# Patient Record
Sex: MALE | Race: WHITE | URBAN - METROPOLITAN AREA
[De-identification: names, ages, dates, MRNs, and addresses within clinical notes are randomized per-mention and may not be internally consistent; named-entity substitution may affect disease eponyms.]

---

## 2017-05-17 ENCOUNTER — OFFICE VISIT (OUTPATIENT)
Dept: NEUROLOGY | Age: 67
End: 2017-05-17

## 2017-05-17 VITALS
HEIGHT: 74 IN | WEIGHT: 173.4 LBS | BODY MASS INDEX: 22.25 KG/M2 | DIASTOLIC BLOOD PRESSURE: 80 MMHG | OXYGEN SATURATION: 97 % | HEART RATE: 74 BPM | SYSTOLIC BLOOD PRESSURE: 122 MMHG

## 2017-05-17 DIAGNOSIS — G25.0 ESSENTIAL TREMOR: Primary | ICD-10-CM

## 2017-05-17 NOTE — PATIENT INSTRUCTIONS
1.  Follow-up in 6 months      A Healthy Lifestyle: Care Instructions  Your Care Instructions  A healthy lifestyle can help you feel good, stay at a healthy weight, and have plenty of energy for both work and play. A healthy lifestyle is something you can share with your whole family. A healthy lifestyle also can lower your risk for serious health problems, such as high blood pressure, heart disease, and diabetes. You can follow a few steps listed below to improve your health and the health of your family. Follow-up care is a key part of your treatment and safety. Be sure to make and go to all appointments, and call your doctor if you are having problems. Its also a good idea to know your test results and keep a list of the medicines you take. How can you care for yourself at home? · Do not eat too much sugar, fat, or fast foods. You can still have dessert and treats now and then. The goal is moderation. · Start small to improve your eating habits. Pay attention to portion sizes, drink less juice and soda pop, and eat more fruits and vegetables. ¨ Eat a healthy amount of food. A 3-ounce serving of meat, for example, is about the size of a deck of cards. Fill the rest of your plate with vegetables and whole grains. ¨ Limit the amount of soda and sports drinks you have every day. Drink more water when you are thirsty. ¨ Eat at least 5 servings of fruits and vegetables every day. It may seem like a lot, but it is not hard to reach this goal. A serving or helping is 1 piece of fruit, 1 cup of vegetables, or 2 cups of leafy, raw vegetables. Have an apple or some carrot sticks as an afternoon snack instead of a candy bar. Try to have fruits and/or vegetables at every meal.  · Make exercise part of your daily routine. You may want to start with simple activities, such as walking, bicycling, or slow swimming. Try to be active 30 to 60 minutes every day. You do not need to do all 30 to 60 minutes all at once.  For example, you can exercise 3 times a day for 10 or 20 minutes. Moderate exercise is safe for most people, but it is always a good idea to talk to your doctor before starting an exercise program.  · Keep moving. Karen Crock the lawn, work in the garden, or [employer]. Take the stairs instead of the elevator at work. · If you smoke, quit. People who smoke have an increased risk for heart attack, stroke, cancer, and other lung illnesses. Quitting is hard, but there are ways to boost your chance of quitting tobacco for good. ¨ Use nicotine gum, patches, or lozenges. ¨ Ask your doctor about stop-smoking programs and medicines. ¨ Keep trying. In addition to reducing your risk of diseases in the future, you will notice some benefits soon after you stop using tobacco. If you have shortness of breath or asthma symptoms, they will likely get better within a few weeks after you quit. · Limit how much alcohol you drink. Moderate amounts of alcohol (up to 2 drinks a day for men, 1 drink a day for women) are okay. But drinking too much can lead to liver problems, high blood pressure, and other health problems. Family health  If you have a family, there are many things you can do together to improve your health. · Eat meals together as a family as often as possible. · Eat healthy foods. This includes fruits, vegetables, lean meats and dairy, and whole grains. · Include your family in your fitness plan. Most people think of activities such as jogging or tennis as the way to fitness, but there are many ways you and your family can be more active. Anything that makes you breathe hard and gets your heart pumping is exercise. Here are some tips:  ¨ Walk to do errands or to take your child to school or the bus. ¨ Go for a family bike ride after dinner instead of watching TV. Where can you learn more? Go to http://garcia-stephanie.info/.   Enter F029 in the search box to learn more about \"A Healthy Lifestyle: Care Instructions. \"  Current as of: July 26, 2016  Content Version: 11.2  © 7171-9905 [employer]. Care instructions adapted under license by [employer] (which disclaims liability or warranty for this information). If you have questions about a medical condition or this instruction, always ask your healthcare professional. [place] [address] any warranty or liability for your use of this information.

## 2017-05-17 NOTE — PROGRESS NOTES
Neurology consultation note  REFERRED BY:  None    05/17/17    Chief Complaint   Patient presents with    New Patient     tremor both hands/head       HISTORY OF PRESENT ILLNESS  Jean Gray is a 77 y.o. male who presented to the neurology office for management of tremors. He states that the tremor started around a year ago and it has been gradually progressing. If this in his neck and his left hand. There are no aggravating or relieving factors. It is postural and there does not seem to be any dictation to alcohol. There is no family history. He has no sense of taste or smell but denies any bradykinesia, rigidity or problems with gait. No current outpatient prescriptions on file. No current facility-administered medications for this visit. Allergies   Allergen Reactions    Pcn [Penicillins] Unknown (comments)     Past Medical History:   Diagnosis Date    Neurological disorder      Past Surgical History:   Procedure Laterality Date   [facility]ajat Devaughn ABDOMEN SURGERY PROC UNLISTED  3728,6712    colostomy, reversal    HX HERNIA REPAIR  2009    HX HERNIA REPAIR  2007     History reviewed. No pertinent family history. Social History   Substance Use Topics    Smoking status: Current Every Day Smoker    Smokeless tobacco: None    Alcohol use Yes      Comment: rarely       REVIEW OF SYSTEMS:   A ten system review of constitutional, cardiovascular, respiratory, musculoskeletal, endocrine, skin, SHEENT, genitourinary, psychiatric and neurologic systems was obtained and is unremarkable with the exception of joint pain, snoring     EXAMINATION:   Visit Vitals    /80    Pulse 74    Ht 6' 2\" (1.88 m)    Wt 173 lb 6.4 oz (78.7 kg)    SpO2 97%    BMI 22.26 kg/m2        General:   General appearance: Pt is in no acute distress   Distal pulses are preserved  Fundoscopic Exam: Attempted    Neurological Examination:   Mental Status: AAO x3. Speech is fluent.  Follows commands, has normal fund of knowledge, attention, short term recall, comprehension and insight. Cranial Nerves: Visual fields are full. PERRL, Extraocular movements are full. Facial sensation intact V1- V3. Facial movement intact, symmetric. Hearing intact to conversation. Palate elevates symmetrically. Shoulder shrug symmetric. Tongue midline. Motor: Strength is 5/5 in all 4 ext except week dorsal interossei of his right hand and weak right abductor pollicis previous. No atrophy. Tone: Normal    Sensation: Decreased pinprick distally in his right hand    Reflexes: DTRs 2+ throughout. Plantar responses downgoing. Coordination/Cerebellar: Intact to finger-nose-finger     Gait: Romberg is negative and casual gait is normal.     Skin: No significant bruising or lacerations. Laboratory review:   Results for orders placed or performed during the hospital encounter of 10/22/09   CBC W/O DIFF   Result Value Ref Range    WBC 13.4 (H) 4.1 - 11.1 K/uL    RBC 4.63 4.10 - 5.70 M/uL    HGB 14.5 12.1 - 17.0 g/dL    HCT 41.6 36.6 - 50.3 %    MCV 89.8 80.0 - 99.0 FL    MCH 31.3 26.0 - 34.0 PG    MCHC 34.9 30.0 - 35.0 g/dL    RDW 14.5 11.5 - 14.5 %    PLATELET 491 875 - 516 K/uL   METABOLIC PANEL, BASIC   Result Value Ref Range    Sodium 140 136 - 145 MMOL/L    Potassium 4.3 3.5 - 5.1 MMOL/L    Chloride 104 97 - 108 MMOL/L    CO2 27 21 - 32 MMOL/L    Anion gap 9 5 - 15 mmol/L    Glucose 98 50 - 100 MG/DL    BUN 10 6 - 20 MG/DL    Creatinine 0.8 0.6 - 1.3 MG/DL    BUN/Creatinine ratio 13 12 - 20      GFR est AA >60 >60 ml/min/1.73m2    GFR est non-AA >60 >60 ml/min/1.73m2    Calcium 9.4 8.5 - 10.1 MG/DL       Imaging review:  None    Documentation review:  None    Assessment/Plan:   Nelsy Brito is a 77 y.o. male who presented to the neurology office for management of tremors. Based on the history and examination, he does seem to have essential tremors. I did not find any signs of Parkinson's disease.   At this time having continued to observe him and see him back in 6 months. ICD-10-CM ICD-9-CM    1. Essential tremor G25.0 333.1       Over 45 minutes was spent with the patient and family of which > 50% of the visit was spent counseling on diagnosis, management, and treatment of the diagnosis. I did discuss about essential tremors and Parkinson's disease. Mark Sarabia MD  Diplomate, American Board of Psychiatry & Neurology (Neurology)  Sussy Healy Board of Psychiatry & Neurology (Clinical Neurophysiology)    CC: None  Fax: None    This note was created using voice recognition software. Despite editing, there may be syntax errors. This note will not be viewable in [address]

## 2017-05-17 NOTE — LETTER
5/17/2017 3:42 PM 
 
Patient:    Beverly Petty YOB: 1950 Date of Visit:    5/17/2017 Dear None Thank you for referring Mr. Melly Mckeon to me for evaluation/treatment. Below are the relevant portions of my assessment and plan of care. Neurology consultation note REFERRED BY: 
None 05/17/17 Chief Complaint Patient presents with  New Patient  
  tremor both hands/head HISTORY OF PRESENT ILLNESS Beverly Petty is a 77 y.o. male who presented to the neurology office for management of tremors. He states that the tremor started around a year ago and it has been gradually progressing. If this in his neck and his left hand. There are no aggravating or relieving factors. It is postural and there does not seem to be any dictation to alcohol. There is no family history. He has no sense of taste or smell but denies any bradykinesia, rigidity or problems with gait. No current outpatient prescriptions on file. No current facility-administered medications for this visit. Allergies Allergen Reactions  Pcn [Penicillins] Unknown (comments) Past Medical History:  
Diagnosis Date  Neurological disorder Past Surgical History:  
Procedure Laterality Date [facility] ABDOMEN SURGERY PROC UNLISTED  2474,7016  
 colostomy, reversal  
 HX HERNIA REPAIR  2009  HX HERNIA REPAIR  2007 History reviewed. No pertinent family history. Social History Substance Use Topics  Smoking status: Current Every Day Smoker  Smokeless tobacco: None  Alcohol use Yes Comment: rarely REVIEW OF SYSTEMS:  
A ten system review of constitutional, cardiovascular, respiratory, musculoskeletal, endocrine, skin, SHEENT, genitourinary, psychiatric and neurologic systems was obtained and is unremarkable with the exception of joint pain, snoring EXAMINATION:  
Visit Vitals  /80  Pulse 74  Ht 6' 2\" (1.88 m)  Wt 173 lb 6.4 oz (78.7 kg)  SpO2 97%  BMI 22.26 kg/m2 General:  
General appearance: Pt is in no acute distress Distal pulses are preserved Fundoscopic Exam: Attempted Neurological Examination:  
Mental Status: AAO x3. Speech is fluent. Follows commands, has normal fund of knowledge, attention, short term recall, comprehension and insight. Cranial Nerves: Visual fields are full. PERRL, Extraocular movements are full. Facial sensation intact V1- V3. Facial movement intact, symmetric. Hearing intact to conversation. Palate elevates symmetrically. Shoulder shrug symmetric. Tongue midline. Motor: Strength is 5/5 in all 4 ext except week dorsal interossei of his right hand and weak right abductor pollicis previous. No atrophy. Tone: Normal 
 
Sensation: Decreased pinprick distally in his right hand Reflexes: DTRs 2+ throughout. Plantar responses downgoing. Coordination/Cerebellar: Intact to finger-nose-finger Gait: Romberg is negative and casual gait is normal.  
 
Skin: No significant bruising or lacerations. Laboratory review:  
Results for orders placed or performed during the hospital encounter of 10/22/09 CBC W/O DIFF Result Value Ref Range WBC 13.4 (H) 4.1 - 11.1 K/uL  
 RBC 4.63 4.10 - 5.70 M/uL  
 HGB 14.5 12.1 - 17.0 g/dL HCT 41.6 36.6 - 50.3 % MCV 89.8 80.0 - 99.0 FL  
 MCH 31.3 26.0 - 34.0 PG  
 MCHC 34.9 30.0 - 35.0 g/dL  
 RDW 14.5 11.5 - 14.5 % PLATELET 047 147 - 935 K/uL METABOLIC PANEL, BASIC Result Value Ref Range Sodium 140 136 - 145 MMOL/L Potassium 4.3 3.5 - 5.1 MMOL/L Chloride 104 97 - 108 MMOL/L  
 CO2 27 21 - 32 MMOL/L Anion gap 9 5 - 15 mmol/L Glucose 98 50 - 100 MG/DL  
 BUN 10 6 - 20 MG/DL Creatinine 0.8 0.6 - 1.3 MG/DL  
 BUN/Creatinine ratio 13 12 - 20 GFR est AA >60 >60 ml/min/1.73m2 GFR est non-AA >60 >60 ml/min/1.73m2 Calcium 9.4 8.5 - 10.1 MG/DL Imaging review: 
None Documentation review: 
None Assessment/Plan: Fany Mancini is a 77 y.o. male who presented to the neurology office for management of tremors. Based on the history and examination, he does seem to have essential tremors. I did not find any signs of Parkinson's disease. At this time having continued to observe him and see him back in 6 months. ICD-10-CM ICD-9-CM 1. Essential tremor G25.0 333.1 Thank you for allowing me to participate in the care of Mr. Lisa Vasquez. Please feel free to contact me if you have any questions. Arthur Coello MD 
Diplomate, American Board of Psychiatry & Neurology (Neurology) Jarrett Corona Board of Psychiatry & Neurology (Clinical Neurophysiology) CC: None Fax: None This note was created using voice recognition software. Despite editing, there may be syntax errors. This note will not be viewable in [address] If you have questions, please do not hesitate to call me. I look forward to following Mr. Lisa Vasquez along with you. Sincerely, Arthur Coello MD

## 2017-05-17 NOTE — MR AVS SNAPSHOT
Visit Information Date & Time Provider Department Dept. Phone Encounter #  
 5/17/2017  3:00 PM Usha Quinn MD [facility] [phone] [id number] Upcoming Health Maintenance Date Due Hepatitis C Screening 1950 DTaP/Tdap/Td series (1 - Tdap) 6/8/1971 FOBT Q 1 YEAR AGE 50-75 6/8/2000 ZOSTER VACCINE AGE 60> 6/8/2010 GLAUCOMA SCREENING Q2Y 6/8/2015 Pneumococcal 65+ Low/Medium Risk (1 of 2 - PCV13) 6/8/2015 MEDICARE YEARLY EXAM 6/8/2015 INFLUENZA AGE 9 TO ADULT 8/1/2017 Allergies as of 5/17/2017  Review Complete On: 5/17/2017 By: Usha Quinn MD  
  
 Severity Noted Reaction Type Reactions Pcn [Penicillins]  08/27/2013    Unknown (comments) Current Immunizations  Never Reviewed No immunizations on file. Not reviewed this visit Vitals BP Pulse Height(growth percentile) Weight(growth percentile) SpO2 BMI  
 122/80 74 6' 2\" (1.88 m) 173 lb 6.4 oz (78.7 kg) 97% 22.26 kg/m2 Smoking Status Current Every Day Smoker BMI and BSA Data Body Mass Index Body Surface Area  
 22.26 kg/m 2 2.03 m 2 Preferred Pharmacy Pharmacy Name Phone [facility] [address] [facility] [address] [facility] [phone] Your Updated Medication List  
  
Notice  As of 5/17/2017  3:18 PM  
 You have not been prescribed any medications. Patient Instructions 1. Follow-up in 6 months A Healthy Lifestyle: Care Instructions Your Care Instructions A healthy lifestyle can help you feel good, stay at a healthy weight, and have plenty of energy for both work and play. A healthy lifestyle is something you can share with your whole family. A healthy lifestyle also can lower your risk for serious health problems, such as high blood pressure, heart disease, and diabetes.  
You can follow a few steps listed below to improve your health and the health of your family. Follow-up care is a key part of your treatment and safety. Be sure to make and go to all appointments, and call your doctor if you are having problems. Its also a good idea to know your test results and keep a list of the medicines you take. How can you care for yourself at home? · Do not eat too much sugar, fat, or fast foods. You can still have dessert and treats now and then. The goal is moderation. · Start small to improve your eating habits. Pay attention to portion sizes, drink less juice and soda pop, and eat more fruits and vegetables. ¨ Eat a healthy amount of food. A 3-ounce serving of meat, for example, is about the size of a deck of cards. Fill the rest of your plate with vegetables and whole grains. ¨ Limit the amount of soda and sports drinks you have every day. Drink more water when you are thirsty. ¨ Eat at least 5 servings of fruits and vegetables every day. It may seem like a lot, but it is not hard to reach this goal. A serving or helping is 1 piece of fruit, 1 cup of vegetables, or 2 cups of leafy, raw vegetables. Have an apple or some carrot sticks as an afternoon snack instead of a candy bar. Try to have fruits and/or vegetables at every meal. 
· Make exercise part of your daily routine. You may want to start with simple activities, such as walking, bicycling, or slow swimming. Try to be active 30 to 60 minutes every day. You do not need to do all 30 to 60 minutes all at once. For example, you can exercise 3 times a day for 10 or 20 minutes. Moderate exercise is safe for most people, but it is always a good idea to talk to your doctor before starting an exercise program. 
· Keep moving. Azucena Unger the lawn, work in the garden, or [employer]. Take the stairs instead of the elevator at work. · If you smoke, quit. People who smoke have an increased risk for heart attack, stroke, cancer, and other lung illnesses.  Quitting is hard, but there are ways to boost your chance of quitting tobacco for good. ¨ Use nicotine gum, patches, or lozenges. ¨ Ask your doctor about stop-smoking programs and medicines. ¨ Keep trying. In addition to reducing your risk of diseases in the future, you will notice some benefits soon after you stop using tobacco. If you have shortness of breath or asthma symptoms, they will likely get better within a few weeks after you quit. · Limit how much alcohol you drink. Moderate amounts of alcohol (up to 2 drinks a day for men, 1 drink a day for women) are okay. But drinking too much can lead to liver problems, high blood pressure, and other health problems. Family health If you have a family, there are many things you can do together to improve your health. · Eat meals together as a family as often as possible. · Eat healthy foods. This includes fruits, vegetables, lean meats and dairy, and whole grains. · Include your family in your fitness plan. Most people think of activities such as jogging or tennis as the way to fitness, but there are many ways you and your family can be more active. Anything that makes you breathe hard and gets your heart pumping is exercise. Here are some tips: 
¨ Walk to do errands or to take your child to school or the bus. ¨ Go for a family bike ride after dinner instead of watching TV. Where can you learn more? Go to http://garcia-stephanie.info/. Enter D054 in the search box to learn more about \"A Healthy Lifestyle: Care Instructions. \" Current as of: July 26, 2016 Content Version: 11.2 © 8482-5625 [employer]. Care instructions adapted under license by [employer] (which disclaims liability or warranty for this information). If you have questions about a medical condition or this instruction, always ask your healthcare professional. [place] [address] any warranty or liability for your use of this information. Introducing [facility]! Migel Carrion introduces [employer] patient portal. Now you can access parts of your medical record, email your doctor's office, and request medication refills online. 1. In your internet browser, go to [email or web address] 2. Click on the First Time User? Click Here link in the Sign In box. You will see the New Member Sign Up page. 3. Enter your [employer] Access Code exactly as it appears below. You will not need to use this code after youve completed the sign-up process. If you do not sign up before the expiration date, you must request a new code. · [employer] Access Code: [email or web address] Expires: 8/15/2017  2:04 PM 
 
4. Enter the last four digits of your Social Security Number (xxxx) and Date of Birth (mm/dd/yyyy) as indicated and click Submit. You will be taken to the next sign-up page. 5. Create a [employer] ID. This will be your [employer] login ID and cannot be changed, so think of one that is secure and easy to remember. 6. Create a [employer] password. You can change your password at any time. 7. Enter your Password Reset Question and Answer. This can be used at a later time if you forget your password. 8. Enter your e-mail address. You will receive e-mail notification when new information is available in [address] 9. Click Sign Up. You can now view and download portions of your medical record. 10. Click the Download Summary menu link to download a portable copy of your medical information. If you have questions, please visit the Frequently Asked Questions section of the [employer] website. Remember, [employer] is NOT to be used for urgent needs. For medical emergencies, dial 911. Now available from your iPhone and Android! Please provide this summary of care documentation to your next provider. Your primary care clinician is listed as NONE. If you have any questions after today's visit, please call [phone].

## 2017-06-29 ENCOUNTER — TELEPHONE (OUTPATIENT)
Dept: NEUROLOGY | Age: 67
End: 2017-06-29

## 2021-03-26 ENCOUNTER — OFFICE VISIT (OUTPATIENT)
Dept: URGENT CARE | Age: 71
End: 2021-03-26
Payer: MEDICARE

## 2021-03-26 VITALS — HEART RATE: 89 BPM | OXYGEN SATURATION: 97 % | TEMPERATURE: 98.4 F | RESPIRATION RATE: 16 BRPM

## 2021-03-26 DIAGNOSIS — Z11.59 SCREENING FOR VIRAL DISEASE: ICD-10-CM

## 2021-03-26 DIAGNOSIS — R09.81 SINUS CONGESTION: Primary | ICD-10-CM

## 2021-03-26 PROCEDURE — 99202 OFFICE O/P NEW SF 15 MIN: CPT | Performed by: FAMILY MEDICINE

## 2021-03-26 NOTE — PROGRESS NOTES
This patient was seen at [address] [place] [address] [facility]gent Care while in their vehicle due to COVID-19 pandemic with PPE and focused examination in order to decrease community viral transmission. The patient/guardian gave verbal consent to treat. Gelacio Lacy is a 79 y.o. male who presents for COVID-19 testing. Has had sinus congestion x 2 weeks. Denies cough, fever, SOB, N/V/D. The history is provided by the patient. Past Medical History:   Diagnosis Date    Neurological disorder         Past Surgical History:   Procedure Laterality Date    HX HERNIA REPAIR  2009    HX HERNIA REPAIR  2007    [place] ABDOMEN SURGERY PROC UNLISTED  [id number]    colostomy, reversal         History reviewed. No pertinent family history.      Social History     Socioeconomic History    Marital status:      Spouse name: Not on file    Number of children: Not on file    Years of education: Not on file    Highest education level: Not on file   Occupational History    Not on file   Social Needs    Financial resource strain: Not on file    Food insecurity     Worry: Not on file     Inability: Not on file    Transportation needs     Medical: Not on file     Non-medical: Not on file   Tobacco Use    Smoking status: Current Every Day Smoker   Substance and Sexual Activity    Alcohol use: Yes     Comment: rarely    Drug use: Not on file    Sexual activity: Not on file   Lifestyle    Physical activity     Days per week: Not on file     Minutes per session: Not on file    Stress: Not on file   Relationships    Social connections     Talks on phone: Not on file     Gets together: Not on file     Attends Mormonism service: Not on file     Active member of club or organization: Not on file     Attends meetings of clubs or organizations: Not on file     Relationship status: Not on file    Intimate partner violence     Fear of current or ex partner: Not on file     Emotionally abused: Not on file     Physically abused: Not on file     Forced sexual activity: Not on file   Other Topics Concern    Not on file   Social History Narrative    Not on file                ALLERGIES: Pcn [penicillins]    Review of Systems   Constitutional: Negative for fever. HENT: Positive for congestion. Respiratory: Negative for cough and shortness of breath. Gastrointestinal: Negative for diarrhea, nausea and vomiting. Vitals:    03/26/21 1236   Pulse: 89   Resp: 16   Temp: 98.4 °F (36.9 °C)   SpO2: 97%       Physical Exam  Vitals signs and nursing note reviewed. Constitutional:       General: He is not in acute distress. Appearance: He is well-developed. He is not diaphoretic. Pulmonary:      Effort: Pulmonary effort is normal. No respiratory distress. Breath sounds: Normal breath sounds. No stridor. No wheezing, rhonchi or rales. Neurological:      Mental Status: He is alert. Psychiatric:         Behavior: Behavior normal.         Thought Content: Thought content normal.         Judgment: Judgment normal.         MDM    ICD-10-CM ICD-9-CM   1. Sinus congestion  R09.81 478.19   2. Screening for viral disease  Z11.59 V73.99       Orders Placed This Encounter    NOVEL CORONAVIRUS (COVID-19)     Scheduling Instructions:      1) Due to current limited availability of the COVID-19 PCR test, tests will be prioritized and may not be completed.              2) Order only if the test result will change clinical management or necessary for a return to mission-critical employment decision.              3) Print and instruct patient to adhere to CDC home isolation program. (Link Above)              4) Set up or refer patient for a monitoring program.              5) Have patient sign up for and leverage Phase [employer] (if not previously done). Order Specific Question:   Is this test for diagnosis or screening? Answer:   Diagnosis of ill patient     Order Specific Question:   Symptomatic for COVID-19 as defined by CDC?      Answer: Yes     Order Specific Question:   Date of Symptom Onset     Answer:   3/12/2021     Order Specific Question:   Hospitalized for COVID-19? Answer:   No     Order Specific Question:   Admitted to ICU for COVID-19? Answer:   No     Order Specific Question:   Employed in healthcare setting? Answer:   No     Order Specific Question:   Resident in a congregate (group) care setting? Answer:   No     Order Specific Question:   Previously tested for COVID-19? Answer:   Unknown        Quarantine, await results    Provider will call if PCR COVID-19 test is positive     If signs and symptoms become worse the pt is to go to the ER.          Procedures

## 2021-03-28 LAB
SARS-COV-2, NAA 2 DAY TAT: NORMAL
SARS-COV-2, NAA: DETECTED

## 2021-03-29 NOTE — PROGRESS NOTES
Attempted to contact patient regarding results, line picked up but no response. Will attempt to contact again.

## 2021-03-30 NOTE — PROGRESS NOTES
Called patient regarding results. Patient aware of positive COVID result as he was already notified and given instruction. Denies SOB, lethargy. Advise ER if worse.

## 2022-01-04 ENCOUNTER — TELEPHONE (OUTPATIENT)
Dept: CARDIOLOGY CLINIC | Age: 72
End: 2022-01-04

## 2022-01-10 NOTE — TELEPHONE ENCOUNTER
Called [facility] & they advised that the pt is inactive at their practice since 2017. Called Filippo Cuello (pt) dary to call me back to obtain a new PCP that may have history/notes/labs on him.

## 2022-01-12 ENCOUNTER — OFFICE VISIT (OUTPATIENT)
Dept: CARDIOLOGY CLINIC | Age: 72
End: 2022-01-12
Payer: MEDICARE

## 2022-01-12 VITALS
HEART RATE: 77 BPM | BODY MASS INDEX: 20.24 KG/M2 | HEIGHT: 74 IN | SYSTOLIC BLOOD PRESSURE: 144 MMHG | OXYGEN SATURATION: 100 % | DIASTOLIC BLOOD PRESSURE: 84 MMHG | WEIGHT: 157.7 LBS | RESPIRATION RATE: 18 BRPM

## 2022-01-12 DIAGNOSIS — I35.0 MILD AORTIC STENOSIS: Primary | ICD-10-CM

## 2022-01-12 DIAGNOSIS — Z86.73 HISTORY OF CVA (CEREBROVASCULAR ACCIDENT): ICD-10-CM

## 2022-01-12 DIAGNOSIS — I10 HYPERTENSION, UNSPECIFIED TYPE: ICD-10-CM

## 2022-01-12 PROCEDURE — G0463 HOSPITAL OUTPT CLINIC VISIT: HCPCS | Performed by: INTERNAL MEDICINE

## 2022-01-12 PROCEDURE — 99204 OFFICE O/P NEW MOD 45 MIN: CPT | Performed by: INTERNAL MEDICINE

## 2022-01-12 PROCEDURE — 93005 ELECTROCARDIOGRAM TRACING: CPT | Performed by: INTERNAL MEDICINE

## 2022-01-12 PROCEDURE — 93000 ELECTROCARDIOGRAM COMPLETE: CPT | Performed by: INTERNAL MEDICINE

## 2022-01-12 RX ORDER — ATORVASTATIN CALCIUM 40 MG/1
1 TABLET, FILM COATED ORAL DAILY
COMMUNITY
Start: 2021-12-31 | End: 2022-02-14 | Stop reason: SDUPTHER

## 2022-01-12 RX ORDER — LOSARTAN POTASSIUM 25 MG/1
25 TABLET ORAL DAILY
COMMUNITY
Start: 2021-12-01 | End: 2022-02-11 | Stop reason: SDUPTHER

## 2022-01-12 RX ORDER — LANOLIN ALCOHOL/MO/W.PET/CERES
1000 CREAM (GRAM) TOPICAL
COMMUNITY

## 2022-01-12 RX ORDER — CLOPIDOGREL BISULFATE 75 MG/1
75 TABLET ORAL DAILY
COMMUNITY
Start: 2021-12-31 | End: 2022-02-11 | Stop reason: SDUPTHER

## 2022-01-12 RX ORDER — ASPIRIN 81 MG/1
81 TABLET ORAL
COMMUNITY
Start: 2021-12-02 | End: 2022-09-27

## 2022-01-12 NOTE — PROGRESS NOTES
[address] [place] [address] [address] [facility]  [phone]     Subjective:      Kaelyn Morales is a 70 y.o. male is here for NP visit. CVA 11/21. Mild AS by echo at that time. No CP, SOB, edema, or syncope. Tony Veloz MD  Past Medical History:   Diagnosis Date    Essential hypertension     Hyperlipidemia     Neurological disorder     Stroke [facility])       Past Surgical History:   Procedure Laterality Date    HX HERNIA REPAIR  2009    HX HERNIA REPAIR  2007    [place] ABDOMEN SURGERY [address] UNLISTED  [id number]    colostomy, reversal     Allergies   Allergen Reactions    Pcn [Penicillins] Unknown (comments)    Sulfa (Sulfonamide Antibiotics) Other (comments)      Family History   Problem Relation Age of Onset    Heart Attack Father       Social History     Socioeconomic History    Marital status:      Spouse name: Not on file    Number of children: Not on file    Years of education: Not on file    Highest education level: Not on file   Occupational History    Not on file   Tobacco Use    Smoking status: Former Smoker    Smokeless tobacco: Never Used   Vaping Use    Vaping Use: Never used   Substance and Sexual Activity    Alcohol use: Yes     Comment: rarely    Drug use: Not on file    Sexual activity: Not on file   Other Topics Concern    Not on file   Social History Narrative    Not on file     Social Determinants of Health     Financial Resource Strain:     Difficulty of Paying Living Expenses: Not on file   Food Insecurity:     Worried About Running Out of Food in the Last Year: Not on file    Erlinda of Food in the Last Year: Not on file   Transportation Needs:     Lack of Transportation (Medical): Not on file    Lack of Transportation (Non-Medical):  Not on file   Physical Activity:     Days of Exercise per Week: Not on file    Minutes of Exercise per Session: Not on file   Stress:     Feeling of Stress : Not on file   Social Connections:     Frequency of Communication with Friends and Family: Not on file    Frequency of Social Gatherings with Friends and Family: Not on file    Attends Taoist Services: Not on file    Active Member of Clubs or Organizations: Not on file    Attends Club or Organization Meetings: Not on file    Marital Status: Not on file   Intimate Partner Violence:     Fear of Current or Ex-Partner: Not on file    Emotionally Abused: Not on file    Physically Abused: Not on file    Sexually Abused: Not on file   Housing Stability:     Unable to Pay for Housing in the Last Year: Not on file    Number of [place] in the Last Year: Not on file    Unstable Housing in the Last Year: Not on file      Current Outpatient Medications   Medication Sig    clopidogreL (PLAVIX) 75 mg tab Take 75 mg by mouth daily.  aspirin delayed-release 81 mg tablet Take 81 mg by mouth daily.  losartan (COZAAR) 25 mg tablet Take 25 mg by mouth daily.  atorvastatin (LIPITOR) 40 mg tablet Take 1 Tablet by mouth daily.  cyanocobalamin 1,000 mcg tablet Take 1,000 mcg by mouth. No current facility-administered medications for this visit. Review of Symptoms:  11 systems reviewed, negative other than as stated in the HPI    Physical ExamPhysical Exam:    Vitals:    01/12/22 0922 01/12/22 0937   BP: (!) 146/86 (!) 144/84   Pulse: 77    Resp: 18    SpO2: 100%    Weight: 157 lb 11.2 oz (71.5 kg)    Height: 6' 2\" (1.88 m)      Body mass index is 20.25 kg/m². General PE  Gen:  NAD  Mental Status - Alert. General Appearance - Not in acute distress. HEENT:  PERRL, no carotid bruits or JVD  Chest and Lung Exam   Inspection: Accessory muscles - No use of accessory muscles in breathing. Auscultation:   Breath sounds: - Normal.   Cardiovascular   Inspection: Jugular vein - Bilateral - Inspection Normal.   Palpation/Percussion:   Apical Impulse: - Normal.   Auscultation: Rhythm - Regular. Heart Sounds - S1 WNL and S2 WNL. No S3 or S4.    Murmurs & Other Heart Sounds: Auscultation of the heart reveals - 2/6 ADALGISA. Peripheral Vascular   Upper Extremity: Inspection - Bilateral - No Cyanotic nailbeds or Digital clubbing. Lower Extremity:   Palpation: Edema - Bilateral - No edema. Abdomen:   Soft, non-tender, bowel sounds are active. Neuro: A&O times 3, CN and motor grossly WNL    Labs:   No results found for: CHOL, CHOLX, CHLST, CHOLV, 209389, HDL, HDLP, LDL, LDLC, DLDLP, TGLX, TRIGL, TRIGP, CHHD, CHHDX  No results found for: CPK, CPKX, CPX  Lab Results   Component Value Date/Time    Sodium 140 10/19/2009 09:40 AM    Potassium 4.3 10/19/2009 09:40 AM    Chloride 104 10/19/2009 09:40 AM    CO2 27 10/19/2009 09:40 AM    Anion gap 9 10/19/2009 09:40 AM    Glucose 98 10/19/2009 09:40 AM    BUN 10 10/19/2009 09:40 AM    Creatinine 0.8 10/19/2009 09:40 AM    BUN/Creatinine ratio 13 10/19/2009 09:40 AM    GFR est AA >60 10/19/2009 09:40 AM    GFR est non-AA >60 10/19/2009 09:40 AM    Calcium 9.4 10/19/2009 09:40 AM       EKG:  NSR normal     Assessment:     Assessment:        ICD-10-CM ICD-9-CM    1. Mild aortic stenosis  I35.0 424.1    2. Hypertension, unspecified type  I10 401.9 AMB POC EKG ROUTINE W/ 12 LEADS, INTER & REP   3. History of CVA (cerebrovascular accident)  Z86.73 V12.54        Orders Placed This Encounter    AMB POC EKG ROUTINE W/ 12 LEADS, INTER & REP     Order Specific Question:   Reason for Exam:     Answer:   routine    clopidogreL (PLAVIX) 75 mg tab     Sig: Take 75 mg by mouth daily.  aspirin delayed-release 81 mg tablet     Sig: Take 81 mg by mouth daily.  losartan (COZAAR) 25 mg tablet     Sig: Take 25 mg by mouth daily.  atorvastatin (LIPITOR) 40 mg tablet     Sig: Take 1 Tablet by mouth daily.  cyanocobalamin 1,000 mcg tablet     Sig: Take 1,000 mcg by mouth. Plan:     Mild AS asymptomatic. F/U 1 year with echo at that time.     Nuris Crowell MD

## 2022-01-12 NOTE — PROGRESS NOTES
1. Have you been to the ER, urgent care clinic since your last visit? Hospitalized since your last visit? Yes, 11/28/21, ER, Left side weakness    2. Have you seen or consulted any other health care providers outside of the [address] [place] [address] since your last visit? Include any pap smears or colon screening.  No         Chief Complaint   Patient presents with    New Patient     Pt denies cardiac symptoms

## 2022-02-11 RX ORDER — ATORVASTATIN CALCIUM 40 MG/1
40 TABLET, FILM COATED ORAL DAILY
Status: CANCELLED | OUTPATIENT
Start: 2022-02-11

## 2022-02-11 NOTE — TELEPHONE ENCOUNTER
Pt came into the office today stating he needs refills for the attached medication. The pharmacy is [facility] on [address].     Any questions call back at [phone]    Thanks  Anjana Peguero

## 2022-02-14 ENCOUNTER — TELEPHONE (OUTPATIENT)
Dept: CARDIOLOGY CLINIC | Age: 72
End: 2022-02-14

## 2022-02-14 DIAGNOSIS — E78.00 HYPERCHOLESTEROLEMIA: Primary | ICD-10-CM

## 2022-02-14 RX ORDER — CLOPIDOGREL BISULFATE 75 MG/1
75 TABLET ORAL DAILY
Qty: 90 TABLET | Refills: 2 | Status: SHIPPED | OUTPATIENT
Start: 2022-02-14 | End: 2022-09-27

## 2022-02-14 RX ORDER — ATORVASTATIN CALCIUM 40 MG/1
40 TABLET, FILM COATED ORAL DAILY
Qty: 30 TABLET | Refills: 1 | Status: SHIPPED | OUTPATIENT
Start: 2022-02-14 | End: 2022-02-15

## 2022-02-14 RX ORDER — LOSARTAN POTASSIUM 25 MG/1
25 TABLET ORAL DAILY
Qty: 90 TABLET | Refills: 2 | Status: SHIPPED | OUTPATIENT
Start: 2022-02-14

## 2022-02-14 NOTE — TELEPHONE ENCOUNTER
Repeat fasting labs in March 2022. If LDL not where we need it, will either increase dose or change to something else.

## 2022-02-14 NOTE — TELEPHONE ENCOUNTER
Spoke with patient. Verified patient with two patient identifiers. Advised we are filling Atorvastatin 40 mg for 30 pills only. Repeat fasting labs in March 2022. If LDL not where we need it, will either increase dose or change to something else. Mailed lab slip to pt. Patient verbalized understanding.

## 2022-02-15 RX ORDER — ATORVASTATIN CALCIUM 40 MG/1
TABLET, FILM COATED ORAL
Qty: 90 TABLET | Refills: 3 | Status: SHIPPED | OUTPATIENT
Start: 2022-02-15

## 2022-03-04 ENCOUNTER — TELEPHONE (OUTPATIENT)
Dept: CARDIOLOGY CLINIC | Age: 72
End: 2022-03-04

## 2022-03-04 NOTE — TELEPHONE ENCOUNTER
----- Message from Tony Rosado NP sent at 3/3/2022  7:22 PM EST -----  Please call the patient and inform that lipids remain at goal.  Continue all meds, no changes.     Thanks,  [employer]

## 2022-03-04 NOTE — TELEPHONE ENCOUNTER
Spoke with patient. Verified patient with two patient identifiers. Advised lipids good. Continue all meds, no changes. Patient verbalized understanding.

## 2022-07-22 ENCOUNTER — APPOINTMENT (OUTPATIENT)
Dept: GENERAL RADIOLOGY | Age: 72
DRG: 377 | End: 2022-07-22
Attending: EMERGENCY MEDICINE
Payer: MEDICARE

## 2022-07-22 ENCOUNTER — HOSPITAL ENCOUNTER (INPATIENT)
Age: 72
LOS: 7 days | Discharge: HOME HEALTH | DRG: 377 | End: 2022-07-30
Attending: EMERGENCY MEDICINE | Admitting: HOSPITALIST
Payer: MEDICARE

## 2022-07-22 ENCOUNTER — APPOINTMENT (OUTPATIENT)
Dept: CT IMAGING | Age: 72
DRG: 377 | End: 2022-07-22
Attending: EMERGENCY MEDICINE
Payer: MEDICARE

## 2022-07-22 DIAGNOSIS — A41.9 SEVERE SEPSIS (HCC): ICD-10-CM

## 2022-07-22 DIAGNOSIS — I95.89 HYPOTENSION DUE TO HYPOVOLEMIA: ICD-10-CM

## 2022-07-22 DIAGNOSIS — D64.9 ACUTE ANEMIA: Primary | ICD-10-CM

## 2022-07-22 DIAGNOSIS — E86.1 HYPOTENSION DUE TO HYPOVOLEMIA: ICD-10-CM

## 2022-07-22 DIAGNOSIS — R65.20 SEVERE SEPSIS (HCC): ICD-10-CM

## 2022-07-22 DIAGNOSIS — N17.9 AKI (ACUTE KIDNEY INJURY) (HCC): ICD-10-CM

## 2022-07-22 DIAGNOSIS — K92.2 UPPER GI BLEED: ICD-10-CM

## 2022-07-22 LAB
ALBUMIN SERPL-MCNC: 3.3 G/DL (ref 3.5–5)
ALBUMIN/GLOB SERPL: 1.1 {RATIO} (ref 1.1–2.2)
ALP SERPL-CCNC: 46 U/L (ref 45–117)
ALT SERPL-CCNC: 18 U/L (ref 12–78)
ANION GAP SERPL CALC-SCNC: 9 MMOL/L (ref 5–15)
AST SERPL-CCNC: 16 U/L (ref 15–37)
BASOPHILS # BLD: 0.1 K/UL (ref 0–0.1)
BASOPHILS NFR BLD: 0 % (ref 0–1)
BILIRUB SERPL-MCNC: 0.6 MG/DL (ref 0.2–1)
BNP SERPL-MCNC: 132 PG/ML
BUN SERPL-MCNC: 96 MG/DL (ref 6–20)
BUN/CREAT SERPL: 49 (ref 12–20)
CALCIUM SERPL-MCNC: 9.6 MG/DL (ref 8.5–10.1)
CHLORIDE SERPL-SCNC: 108 MMOL/L (ref 97–108)
CO2 SERPL-SCNC: 21 MMOL/L (ref 21–32)
COMMENT, HOLDF: NORMAL
CREAT SERPL-MCNC: 1.97 MG/DL (ref 0.7–1.3)
DIFFERENTIAL METHOD BLD: ABNORMAL
EOSINOPHIL # BLD: 0 K/UL (ref 0–0.4)
EOSINOPHIL NFR BLD: 0 % (ref 0–7)
ERYTHROCYTE [DISTWIDTH] IN BLOOD BY AUTOMATED COUNT: 16.5 % (ref 11.5–14.5)
GLOBULIN SER CALC-MCNC: 3.1 G/DL (ref 2–4)
GLUCOSE SERPL-MCNC: 135 MG/DL (ref 65–100)
HCT VFR BLD AUTO: 22.3 % (ref 36.6–50.3)
HGB BLD-MCNC: 7.3 G/DL (ref 12.1–17)
IMM GRANULOCYTES # BLD AUTO: 0.2 K/UL (ref 0–0.04)
IMM GRANULOCYTES NFR BLD AUTO: 1 % (ref 0–0.5)
LACTATE BLD-SCNC: 2.26 MMOL/L (ref 0.4–2)
LYMPHOCYTES # BLD: 2 K/UL (ref 0.8–3.5)
LYMPHOCYTES NFR BLD: 10 % (ref 12–49)
MCH RBC QN AUTO: 28.2 PG (ref 26–34)
MCHC RBC AUTO-ENTMCNC: 32.7 G/DL (ref 30–36.5)
MCV RBC AUTO: 86.1 FL (ref 80–99)
MONOCYTES # BLD: 0.9 K/UL (ref 0–1)
MONOCYTES NFR BLD: 4 % (ref 5–13)
NEUTS SEG # BLD: 17 K/UL (ref 1.8–8)
NEUTS SEG NFR BLD: 85 % (ref 32–75)
NRBC # BLD: 0 K/UL (ref 0–0.01)
NRBC BLD-RTO: 0 PER 100 WBC
PLATELET # BLD AUTO: 345 K/UL (ref 150–400)
PMV BLD AUTO: 9.6 FL (ref 8.9–12.9)
POTASSIUM SERPL-SCNC: 4.2 MMOL/L (ref 3.5–5.1)
PROT SERPL-MCNC: 6.4 G/DL (ref 6.4–8.2)
RBC # BLD AUTO: 2.59 M/UL (ref 4.1–5.7)
SAMPLES BEING HELD,HOLD: NORMAL
SODIUM SERPL-SCNC: 138 MMOL/L (ref 136–145)
TROPONIN-HIGH SENSITIVITY: 31 NG/L (ref 0–76)
WBC # BLD AUTO: 20.2 K/UL (ref 4.1–11.1)

## 2022-07-22 PROCEDURE — 87077 CULTURE AEROBIC IDENTIFY: CPT

## 2022-07-22 PROCEDURE — 87150 DNA/RNA AMPLIFIED PROBE: CPT

## 2022-07-22 PROCEDURE — 83605 ASSAY OF LACTIC ACID: CPT

## 2022-07-22 PROCEDURE — C9113 INJ PANTOPRAZOLE SODIUM, VIA: HCPCS | Performed by: EMERGENCY MEDICINE

## 2022-07-22 PROCEDURE — 71045 X-RAY EXAM CHEST 1 VIEW: CPT

## 2022-07-22 PROCEDURE — 83540 ASSAY OF IRON: CPT

## 2022-07-22 PROCEDURE — 83880 ASSAY OF NATRIURETIC PEPTIDE: CPT

## 2022-07-22 PROCEDURE — 74011000250 HC RX REV CODE- 250: Performed by: EMERGENCY MEDICINE

## 2022-07-22 PROCEDURE — 85025 COMPLETE CBC W/AUTO DIFF WBC: CPT

## 2022-07-22 PROCEDURE — 36415 COLL VENOUS BLD VENIPUNCTURE: CPT

## 2022-07-22 PROCEDURE — 99285 EMERGENCY DEPT VISIT HI MDM: CPT

## 2022-07-22 PROCEDURE — 96374 THER/PROPH/DIAG INJ IV PUSH: CPT

## 2022-07-22 PROCEDURE — 82746 ASSAY OF FOLIC ACID SERUM: CPT

## 2022-07-22 PROCEDURE — 93005 ELECTROCARDIOGRAM TRACING: CPT

## 2022-07-22 PROCEDURE — 80053 COMPREHEN METABOLIC PANEL: CPT

## 2022-07-22 PROCEDURE — 87186 SC STD MICRODIL/AGAR DIL: CPT

## 2022-07-22 PROCEDURE — 87040 BLOOD CULTURE FOR BACTERIA: CPT

## 2022-07-22 PROCEDURE — 82607 VITAMIN B-12: CPT

## 2022-07-22 PROCEDURE — 74011250636 HC RX REV CODE- 250/636: Performed by: EMERGENCY MEDICINE

## 2022-07-22 PROCEDURE — 84484 ASSAY OF TROPONIN QUANT: CPT

## 2022-07-22 RX ORDER — SODIUM CHLORIDE 0.9 % (FLUSH) 0.9 %
5-10 SYRINGE (ML) INJECTION AS NEEDED
Status: DISCONTINUED | OUTPATIENT
Start: 2022-07-22 | End: 2022-07-23

## 2022-07-22 RX ADMIN — SODIUM CHLORIDE 40 MG: 9 INJECTION, SOLUTION INTRAMUSCULAR; INTRAVENOUS; SUBCUTANEOUS at 23:35

## 2022-07-22 RX ADMIN — SODIUM CHLORIDE, POTASSIUM CHLORIDE, SODIUM LACTATE AND CALCIUM CHLORIDE 2178 ML: 600; 310; 30; 20 INJECTION, SOLUTION INTRAVENOUS at 23:34

## 2022-07-23 ENCOUNTER — APPOINTMENT (OUTPATIENT)
Dept: ULTRASOUND IMAGING | Age: 72
DRG: 377 | End: 2022-07-23
Attending: HOSPITALIST
Payer: MEDICARE

## 2022-07-23 ENCOUNTER — APPOINTMENT (OUTPATIENT)
Dept: CT IMAGING | Age: 72
DRG: 377 | End: 2022-07-23
Attending: EMERGENCY MEDICINE
Payer: MEDICARE

## 2022-07-23 PROBLEM — K92.2 UPPER GI BLEED: Status: ACTIVE | Noted: 2022-07-23

## 2022-07-23 PROBLEM — D62 ACUTE BLOOD LOSS ANEMIA: Status: ACTIVE | Noted: 2022-07-23

## 2022-07-23 PROBLEM — A41.9 SEPSIS (HCC): Status: ACTIVE | Noted: 2022-07-23

## 2022-07-23 LAB
ANION GAP SERPL CALC-SCNC: 6 MMOL/L (ref 5–15)
ANION GAP SERPL CALC-SCNC: 9 MMOL/L (ref 5–15)
APPEARANCE UR: CLEAR
ATRIAL RATE: 80 BPM
BACTERIA URNS QL MICRO: ABNORMAL /HPF
BASE DEFICIT BLD-SCNC: 4.5 MMOL/L
BASOPHILS # BLD: 0.1 K/UL (ref 0–0.1)
BASOPHILS # BLD: 0.1 K/UL (ref 0–0.1)
BASOPHILS NFR BLD: 0 % (ref 0–1)
BASOPHILS NFR BLD: 0 % (ref 0–1)
BILIRUB UR QL: NEGATIVE
BUN SERPL-MCNC: 63 MG/DL (ref 6–20)
BUN SERPL-MCNC: 84 MG/DL (ref 6–20)
BUN/CREAT SERPL: 53 (ref 12–20)
BUN/CREAT SERPL: 56 (ref 12–20)
CA-I BLD-MCNC: 1.27 MMOL/L (ref 1.12–1.32)
CALCIUM SERPL-MCNC: 8.5 MG/DL (ref 8.5–10.1)
CALCIUM SERPL-MCNC: 8.7 MG/DL (ref 8.5–10.1)
CALCULATED P AXIS, ECG09: 69 DEGREES
CALCULATED R AXIS, ECG10: 55 DEGREES
CALCULATED T AXIS, ECG11: -120 DEGREES
CHLORIDE BLD-SCNC: 107 MMOL/L (ref 100–108)
CHLORIDE SERPL-SCNC: 110 MMOL/L (ref 97–108)
CHLORIDE SERPL-SCNC: 112 MMOL/L (ref 97–108)
CO2 BLD-SCNC: 20 MMOL/L (ref 19–24)
CO2 SERPL-SCNC: 21 MMOL/L (ref 21–32)
CO2 SERPL-SCNC: 23 MMOL/L (ref 21–32)
COLOR UR: ABNORMAL
COMMENT, HOLDF: NORMAL
COVID-19 RAPID TEST, COVR: NOT DETECTED
CREAT SERPL-MCNC: 1.19 MG/DL (ref 0.7–1.3)
CREAT SERPL-MCNC: 1.49 MG/DL (ref 0.7–1.3)
CREAT UR-MCNC: 1.8 MG/DL (ref 0.6–1.3)
DIAGNOSIS, 93000: NORMAL
DIFFERENTIAL METHOD BLD: ABNORMAL
DIFFERENTIAL METHOD BLD: ABNORMAL
EOSINOPHIL # BLD: 0 K/UL (ref 0–0.4)
EOSINOPHIL # BLD: 0.1 K/UL (ref 0–0.4)
EOSINOPHIL NFR BLD: 0 % (ref 0–7)
EOSINOPHIL NFR BLD: 1 % (ref 0–7)
EPITH CASTS URNS QL MICRO: ABNORMAL /LPF
ERYTHROCYTE [DISTWIDTH] IN BLOOD BY AUTOMATED COUNT: 16.1 % (ref 11.5–14.5)
ERYTHROCYTE [DISTWIDTH] IN BLOOD BY AUTOMATED COUNT: 16.6 % (ref 11.5–14.5)
FOLATE SERPL-MCNC: 35.9 NG/ML (ref 5–21)
GLUCOSE BLD STRIP.AUTO-MCNC: 102 MG/DL (ref 74–106)
GLUCOSE SERPL-MCNC: 113 MG/DL (ref 65–100)
GLUCOSE SERPL-MCNC: 96 MG/DL (ref 65–100)
GLUCOSE UR STRIP.AUTO-MCNC: NEGATIVE MG/DL
HCO3 BLDA-SCNC: 20 MMOL/L
HCT VFR BLD AUTO: 17.7 % (ref 36.6–50.3)
HCT VFR BLD AUTO: 20.7 % (ref 36.6–50.3)
HEMOCCULT STL QL: POSITIVE
HGB BLD-MCNC: 5.8 G/DL (ref 12.1–17)
HGB BLD-MCNC: 6.8 G/DL (ref 12.1–17)
HGB BLD-MCNC: 7.7 G/DL (ref 12.1–17)
HGB UR QL STRIP: NEGATIVE
HISTORY CHECKED?,CKHIST: NORMAL
IMM GRANULOCYTES # BLD AUTO: 0.1 K/UL (ref 0–0.04)
IMM GRANULOCYTES # BLD AUTO: 0.1 K/UL (ref 0–0.04)
IMM GRANULOCYTES NFR BLD AUTO: 1 % (ref 0–0.5)
IMM GRANULOCYTES NFR BLD AUTO: 1 % (ref 0–0.5)
IRON SATN MFR SERPL: 17 % (ref 20–50)
IRON SERPL-MCNC: 55 UG/DL (ref 35–150)
KETONES UR QL STRIP.AUTO: NEGATIVE MG/DL
LACTATE BLD-SCNC: 1.52 MMOL/L (ref 0.4–2)
LEUKOCYTE ESTERASE UR QL STRIP.AUTO: ABNORMAL
LYMPHOCYTES # BLD: 1.6 K/UL (ref 0.8–3.5)
LYMPHOCYTES # BLD: 2.6 K/UL (ref 0.8–3.5)
LYMPHOCYTES NFR BLD: 13 % (ref 12–49)
LYMPHOCYTES NFR BLD: 16 % (ref 12–49)
MAGNESIUM SERPL-MCNC: 1.8 MG/DL (ref 1.6–2.4)
MAGNESIUM SERPL-MCNC: 1.9 MG/DL (ref 1.6–2.4)
MCH RBC QN AUTO: 28.2 PG (ref 26–34)
MCH RBC QN AUTO: 28.7 PG (ref 26–34)
MCHC RBC AUTO-ENTMCNC: 32.8 G/DL (ref 30–36.5)
MCHC RBC AUTO-ENTMCNC: 32.9 G/DL (ref 30–36.5)
MCV RBC AUTO: 85.9 FL (ref 80–99)
MCV RBC AUTO: 87.3 FL (ref 80–99)
MONOCYTES # BLD: 0.9 K/UL (ref 0–1)
MONOCYTES # BLD: 1.1 K/UL (ref 0–1)
MONOCYTES NFR BLD: 7 % (ref 5–13)
MONOCYTES NFR BLD: 8 % (ref 5–13)
NEUTS SEG # BLD: 12.5 K/UL (ref 1.8–8)
NEUTS SEG # BLD: 9.2 K/UL (ref 1.8–8)
NEUTS SEG NFR BLD: 76 % (ref 32–75)
NEUTS SEG NFR BLD: 77 % (ref 32–75)
NITRITE UR QL STRIP.AUTO: NEGATIVE
NRBC # BLD: 0 K/UL (ref 0–0.01)
NRBC # BLD: 0 K/UL (ref 0–0.01)
NRBC BLD-RTO: 0 PER 100 WBC
NRBC BLD-RTO: 0 PER 100 WBC
P-R INTERVAL, ECG05: 110 MS
PCO2 BLDV: 29.5 MMHG (ref 41–51)
PH BLDV: 7.43 [PH] (ref 7.32–7.42)
PH UR STRIP: 5 [PH] (ref 5–8)
PHOSPHATE SERPL-MCNC: 3 MG/DL (ref 2.6–4.7)
PHOSPHATE SERPL-MCNC: 3.1 MG/DL (ref 2.6–4.7)
PLATELET # BLD AUTO: 247 K/UL (ref 150–400)
PLATELET # BLD AUTO: 271 K/UL (ref 150–400)
PMV BLD AUTO: 9.4 FL (ref 8.9–12.9)
PMV BLD AUTO: 9.6 FL (ref 8.9–12.9)
PO2 BLDV: 40 MMHG (ref 25–40)
POTASSIUM BLD-SCNC: 3.9 MMOL/L (ref 3.5–5.5)
POTASSIUM SERPL-SCNC: 3.7 MMOL/L (ref 3.5–5.1)
POTASSIUM SERPL-SCNC: 4 MMOL/L (ref 3.5–5.1)
PROT UR STRIP-MCNC: ABNORMAL MG/DL
Q-T INTERVAL, ECG07: 342 MS
QRS DURATION, ECG06: 88 MS
QTC CALCULATION (BEZET), ECG08: 394 MS
RBC # BLD AUTO: 2.06 M/UL (ref 4.1–5.7)
RBC # BLD AUTO: 2.37 M/UL (ref 4.1–5.7)
RBC #/AREA URNS HPF: ABNORMAL /HPF (ref 0–5)
SAMPLES BEING HELD,HOLD: NORMAL
SODIUM BLD-SCNC: 138 MMOL/L (ref 136–145)
SODIUM SERPL-SCNC: 140 MMOL/L (ref 136–145)
SODIUM SERPL-SCNC: 141 MMOL/L (ref 136–145)
SOURCE, COVRS: NORMAL
SP GR UR REFRACTOMETRY: 1.02
SPECIMEN SITE: ABNORMAL
TIBC SERPL-MCNC: 322 UG/DL (ref 250–450)
UA: UC IF INDICATED,UAUC: ABNORMAL
UROBILINOGEN UR QL STRIP.AUTO: 0.2 EU/DL (ref 0.2–1)
VENTRICULAR RATE, ECG03: 80 BPM
VIT B12 SERPL-MCNC: 698 PG/ML (ref 193–986)
WBC # BLD AUTO: 12 K/UL (ref 4.1–11.1)
WBC # BLD AUTO: 16.5 K/UL (ref 4.1–11.1)
WBC URNS QL MICRO: ABNORMAL /HPF (ref 0–4)

## 2022-07-23 PROCEDURE — 96368 THER/DIAG CONCURRENT INF: CPT

## 2022-07-23 PROCEDURE — 96366 THER/PROPH/DIAG IV INF ADDON: CPT

## 2022-07-23 PROCEDURE — 74011000636 HC RX REV CODE- 636: Performed by: EMERGENCY MEDICINE

## 2022-07-23 PROCEDURE — 85025 COMPLETE CBC W/AUTO DIFF WBC: CPT

## 2022-07-23 PROCEDURE — P9016 RBC LEUKOCYTES REDUCED: HCPCS

## 2022-07-23 PROCEDURE — 96375 TX/PRO/DX INJ NEW DRUG ADDON: CPT

## 2022-07-23 PROCEDURE — 74011000258 HC RX REV CODE- 258: Performed by: HOSPITALIST

## 2022-07-23 PROCEDURE — 74011000250 HC RX REV CODE- 250: Performed by: NURSE PRACTITIONER

## 2022-07-23 PROCEDURE — 74011250636 HC RX REV CODE- 250/636: Performed by: NURSE PRACTITIONER

## 2022-07-23 PROCEDURE — 87635 SARS-COV-2 COVID-19 AMP PRB: CPT

## 2022-07-23 PROCEDURE — 74011000258 HC RX REV CODE- 258: Performed by: EMERGENCY MEDICINE

## 2022-07-23 PROCEDURE — 65610000006 HC RM INTENSIVE CARE

## 2022-07-23 PROCEDURE — 96374 THER/PROPH/DIAG INJ IV PUSH: CPT

## 2022-07-23 PROCEDURE — 84100 ASSAY OF PHOSPHORUS: CPT

## 2022-07-23 PROCEDURE — 82947 ASSAY GLUCOSE BLOOD QUANT: CPT

## 2022-07-23 PROCEDURE — 74178 CT ABD&PLV WO CNTR FLWD CNTR: CPT

## 2022-07-23 PROCEDURE — 36430 TRANSFUSION BLD/BLD COMPNT: CPT

## 2022-07-23 PROCEDURE — 83735 ASSAY OF MAGNESIUM: CPT

## 2022-07-23 PROCEDURE — 74011000258 HC RX REV CODE- 258: Performed by: NURSE PRACTITIONER

## 2022-07-23 PROCEDURE — 86900 BLOOD TYPING SEROLOGIC ABO: CPT

## 2022-07-23 PROCEDURE — 74011250636 HC RX REV CODE- 250/636: Performed by: EMERGENCY MEDICINE

## 2022-07-23 PROCEDURE — 81001 URINALYSIS AUTO W/SCOPE: CPT

## 2022-07-23 PROCEDURE — 86923 COMPATIBILITY TEST ELECTRIC: CPT

## 2022-07-23 PROCEDURE — 74011250636 HC RX REV CODE- 250/636: Performed by: HOSPITALIST

## 2022-07-23 PROCEDURE — 36415 COLL VENOUS BLD VENIPUNCTURE: CPT

## 2022-07-23 PROCEDURE — 83605 ASSAY OF LACTIC ACID: CPT

## 2022-07-23 PROCEDURE — 85018 HEMOGLOBIN: CPT

## 2022-07-23 PROCEDURE — 80048 BASIC METABOLIC PNL TOTAL CA: CPT

## 2022-07-23 PROCEDURE — 74011000250 HC RX REV CODE- 250: Performed by: EMERGENCY MEDICINE

## 2022-07-23 PROCEDURE — 96365 THER/PROPH/DIAG IV INF INIT: CPT

## 2022-07-23 PROCEDURE — C9113 INJ PANTOPRAZOLE SODIUM, VIA: HCPCS | Performed by: NURSE PRACTITIONER

## 2022-07-23 PROCEDURE — 82272 OCCULT BLD FECES 1-3 TESTS: CPT

## 2022-07-23 PROCEDURE — 93970 EXTREMITY STUDY: CPT

## 2022-07-23 RX ORDER — POLYETHYLENE GLYCOL 3350 17 G/17G
17 POWDER, FOR SOLUTION ORAL DAILY PRN
Status: DISCONTINUED | OUTPATIENT
Start: 2022-07-23 | End: 2022-07-30 | Stop reason: HOSPADM

## 2022-07-23 RX ORDER — SODIUM CHLORIDE 0.9 % (FLUSH) 0.9 %
5-40 SYRINGE (ML) INJECTION AS NEEDED
Status: DISCONTINUED | OUTPATIENT
Start: 2022-07-23 | End: 2022-07-29

## 2022-07-23 RX ORDER — VANCOMYCIN/0.9 % SOD CHLORIDE 1.5G/250ML
1500 PLASTIC BAG, INJECTION (ML) INTRAVENOUS ONCE
Status: COMPLETED | OUTPATIENT
Start: 2022-07-23 | End: 2022-07-23

## 2022-07-23 RX ORDER — NOREPINEPHRINE BITARTRATE/D5W 8 MG/250ML
.5-16 PLASTIC BAG, INJECTION (ML) INTRAVENOUS
Status: DISCONTINUED | OUTPATIENT
Start: 2022-07-23 | End: 2022-07-25

## 2022-07-23 RX ORDER — ACETAMINOPHEN 650 MG/1
650 SUPPOSITORY RECTAL
Status: DISCONTINUED | OUTPATIENT
Start: 2022-07-23 | End: 2022-07-30 | Stop reason: HOSPADM

## 2022-07-23 RX ORDER — LEVOFLOXACIN 5 MG/ML
750 INJECTION, SOLUTION INTRAVENOUS
Status: DISCONTINUED | OUTPATIENT
Start: 2022-07-23 | End: 2022-07-23

## 2022-07-23 RX ORDER — AA/PROT/LYSINE/METHIO/VIT C/B6 50-12.5 MG
TABLET ORAL
COMMUNITY

## 2022-07-23 RX ORDER — ACETAMINOPHEN 325 MG/1
650 TABLET ORAL
Status: DISCONTINUED | OUTPATIENT
Start: 2022-07-23 | End: 2022-07-30 | Stop reason: HOSPADM

## 2022-07-23 RX ORDER — SODIUM CHLORIDE 0.9 % (FLUSH) 0.9 %
5-40 SYRINGE (ML) INJECTION EVERY 8 HOURS
Status: DISCONTINUED | OUTPATIENT
Start: 2022-07-23 | End: 2022-07-29

## 2022-07-23 RX ORDER — SODIUM CHLORIDE 9 MG/ML
250 INJECTION, SOLUTION INTRAVENOUS AS NEEDED
Status: DISCONTINUED | OUTPATIENT
Start: 2022-07-23 | End: 2022-07-30 | Stop reason: HOSPADM

## 2022-07-23 RX ADMIN — SODIUM CHLORIDE, PRESERVATIVE FREE 10 ML: 5 INJECTION INTRAVENOUS at 21:37

## 2022-07-23 RX ADMIN — IOPAMIDOL 100 ML: 755 INJECTION, SOLUTION INTRAVENOUS at 00:11

## 2022-07-23 RX ADMIN — VANCOMYCIN HYDROCHLORIDE 1000 MG: 1 INJECTION, POWDER, LYOPHILIZED, FOR SOLUTION INTRAVENOUS at 15:20

## 2022-07-23 RX ADMIN — CEFEPIME 2 G: 2 INJECTION, POWDER, FOR SOLUTION INTRAVENOUS at 01:13

## 2022-07-23 RX ADMIN — SODIUM CHLORIDE, PRESERVATIVE FREE 40 MG: 5 INJECTION INTRAVENOUS at 21:37

## 2022-07-23 RX ADMIN — CEFEPIME 2 G: 2 INJECTION, POWDER, FOR SOLUTION INTRAVENOUS at 15:18

## 2022-07-23 RX ADMIN — LEVOFLOXACIN 750 MG: 5 INJECTION, SOLUTION INTRAVENOUS at 01:13

## 2022-07-23 RX ADMIN — SODIUM CHLORIDE, PRESERVATIVE FREE 10 ML: 5 INJECTION INTRAVENOUS at 15:22

## 2022-07-23 RX ADMIN — SODIUM CHLORIDE, PRESERVATIVE FREE 20 ML: 5 INJECTION INTRAVENOUS at 05:17

## 2022-07-23 RX ADMIN — SODIUM CHLORIDE, PRESERVATIVE FREE 40 MG: 5 INJECTION INTRAVENOUS at 10:00

## 2022-07-23 RX ADMIN — VANCOMYCIN HYDROCHLORIDE 1500 MG: 10 INJECTION, POWDER, LYOPHILIZED, FOR SOLUTION INTRAVENOUS at 03:14

## 2022-07-23 RX ADMIN — NOREPINEPHRINE BITARTRATE 4 MCG/MIN: 1 INJECTION, SOLUTION, CONCENTRATE INTRAVENOUS at 02:53

## 2022-07-23 NOTE — PROGRESS NOTES
Pharmacy Antimicrobial Kinetic Dosing    Indication for Antimicrobials: Sepsis     Current Regimen of Each Antimicrobial:  Vancomycin 1500mg IV x1 then 500mg Iv q12h   (Start Date ; Day # 1)  Cefepime 2gm IV q24h  (Start date ; Day #1)      Previous Antimicrobial Therapy:      Goal Level: AUC: 400-600 mg/hr/Liter/day    Date Dose & Interval Measured (mcg/mL) Predicted AUC/BEAR                       Date & time of next level  ~     Dosing calculator used: MYOMO calculator    Significant Positive Cultures:    - Blood- pending    Conditions for Dosing Consideration: None    Labs:  Recent Labs     22  0306 22   CREA 1.49* 1.97*   BUN 84* 96*     Recent Labs     22  0306 22   WBC 16.5* 20.2*     Temp (24hrs), Av.3 °F (36.8 °C), Min:97.8 °F (36.6 °C), Max:98.8 °F (37.1 °C)        Creatinine Clearance (mL/min):   CrCl (Ideal Body Weight): 52.1   If actual weight < IBW: CrCl (Actual Body Weight) 42.0    Impression/Plan:   Vancomycin 1500mg Iv x1 then 500mg IV q12h for predicted  mg*hr/L and trough 17.7 mcg/ml  BMP daily   Antimicrobial stop date-   tbd     Pharmacy will follow daily and adjust medications as appropriate for renal function and/or serum levels.     Thank you,  Stephen Carlson, [facility]    Vancomycin Dosing Document    Documents located on pharmacy Teams site: Clinical Practice -> Antimicrobial Stewardship -> Antibiotics_Vancomycin     Aminoglycoside Dosing Document    Documents located on pharmacy Teams site: Clinical Practice -> Antimicrobial Stewardship -> Antibiotics_Aminoglycosides

## 2022-07-23 NOTE — ED PROVIDER NOTES
EMERGENCY DEPARTMENT HISTORY AND PHYSICAL EXAM    Please note that this dictation was completed with [employer], the computer voice recognition software. Quite often unanticipated grammatical, syntax, homophones, and other interpretive errors are inadvertently transcribed by the computer software. Please disregard these errors. Please excuse any errors that have escaped final proofreading. Date: 7/22/2022  Patient Name: Wyatt Casanova  Patient Age and Sex: 67 y.o. male    History of Presenting Illness     Chief Complaint   Patient presents with    Fatigue     Patient to triage w family w c/o being weak onset coming from [facility]. Family reports patient had a stroke in 11/21 and is showing symptoms. History Provided By: Patient , family memebers    Chief Complaint: weakness      HPI: Wyatt Casanova, is a 67 y.o. male whose medical history is listed below presents to the ED with severe, generalized weakness and fatigue. Recently went to [place] to visit family and on the way back he gradually became increasingly weak. Over a period of 24 hours this progressed to him no longer being able to drive and he and his wife had to call family in [place] to come pick them up. He denies having cp, sob, palpitations, abd pain, fever or chills. Had an episode of a large loose stool today that appeared purplish in color but this was after he and his wife ate blueberries and he attributed it to that. No hematemesis. No syncopal episodes. Denies any focal weakness, speech disturbance, headache or neck pain. Last hospitalized last year for a cva. On plavix. No history of GI bleed or need for blood transfusions. Pt denies any other alleviating or exacerbating factors. No other associated signs or symptoms. There are no other complaints, changes or physical findings at this time.      PCP: Mary Brito MD    Past History   All documented elements of the [address] reviewed and verified by me. -Regino Bonner MD    Past Medical History:  Past Medical History:   Diagnosis Date    Essential hypertension     Hyperlipidemia     Neurological disorder     Stroke [facility])        Past Surgical History:  Past Surgical History:   Procedure Laterality Date    HX HERNIA REPAIR  2009    HX HERNIA REPAIR  2007    DE ABDOMEN SURGERY [address] UNLISTED  [id number]    colostomy, reversal       Family History:   Family History   Problem Relation Age of Onset    Heart Attack Father        Social History:  Social History     Tobacco Use    Smoking status: Former    Smokeless tobacco: Never   Vaping Use    Vaping Use: Never used   Substance Use Topics    Alcohol use: Yes     Comment: rarely       Current Medications:  No current facility-administered medications on file prior to encounter. Current Outpatient Medications on File Prior to Encounter   Medication Sig Dispense Refill    atorvastatin (LIPITOR) 40 mg tablet TAKE 1 TABLET BY MOUTH DAILY. REPEAT FASTING LABS 3-2-2022 BEFORE FURTHER REFILLS 90 Tablet 3    clopidogreL (PLAVIX) 75 mg tab Take 1 Tablet by mouth daily. 90 Tablet 2    losartan (COZAAR) 25 mg tablet Take 1 Tablet by mouth daily. 90 Tablet 2    aspirin delayed-release 81 mg tablet Take 81 mg by mouth daily. cyanocobalamin 1,000 mcg tablet Take 1,000 mcg by mouth. Allergies: Allergies   Allergen Reactions    Pcn [Penicillins] Unknown (comments)    Sulfa (Sulfonamide Antibiotics) Other (comments)       Review of Systems   All other systems reviewed and negative    Review of Systems   Constitutional:  Positive for fatigue. Negative for chills and fever. HENT:  Negative for congestion, sore throat and trouble swallowing. Eyes:  Negative for photophobia and visual disturbance. Respiratory:  Negative for cough and shortness of breath. Cardiovascular:  Negative for chest pain, palpitations and leg swelling. Gastrointestinal:  Negative for abdominal pain, blood in stool and vomiting. Endocrine: Negative.     Genitourinary: Negative for decreased urine volume, dysuria, flank pain and hematuria. Musculoskeletal:  Negative for back pain, joint swelling, myalgias, neck pain and neck stiffness. Skin:  Positive for pallor. Negative for rash. Neurological:  Positive for light-headedness. Negative for tremors, seizures, syncope, facial asymmetry, speech difficulty, weakness, numbness and headaches. Hematological:  Bruises/bleeds easily. Psychiatric/Behavioral: Negative. Negative for confusion. All other systems reviewed and are negative. Physical Exam   Reviewed patients vital signs and nursing note    Physical Exam  Vitals and nursing note reviewed. Constitutional:       Appearance: He is ill-appearing. He is not diaphoretic. HENT:      Head: Atraumatic. Nose: Nose normal.      Mouth/Throat:      Mouth: Mucous membranes are dry. Eyes:      General: No scleral icterus. Extraocular Movements: Extraocular movements intact. Conjunctiva/sclera: Conjunctivae normal.      Pupils: Pupils are equal, round, and reactive to light. Cardiovascular:      Rate and Rhythm: Normal rate and regular rhythm. Pulses: Normal pulses. Heart sounds: Normal heart sounds. Pulmonary:      Effort: Pulmonary effort is normal.      Breath sounds: Normal breath sounds. Abdominal:      General: Bowel sounds are normal.      Palpations: Abdomen is soft. Tenderness: There is no abdominal tenderness. Genitourinary:     Rectum: Guaiac result positive. Musculoskeletal:         General: No tenderness. Normal range of motion. Cervical back: Normal range of motion and neck supple. No rigidity or tenderness. Right lower leg: No edema. Left lower leg: No edema. Skin:     General: Skin is warm and dry. Capillary Refill: Capillary refill takes 2 to 3 seconds. Coloration: Skin is pale. Neurological:      General: No focal deficit present.       Mental Status: He is alert and oriented to person, place, and time. Psychiatric:         Mood and Affect: Mood normal.         Behavior: Behavior normal.       Diagnostic Study Results     Labs - I have personally reviewed and interpreted all laboratory results. Interpretation of available and pertinent results detailed below in MDM. Darien Lay MD, MSc  Recent Results (from the past 24 hour(s))   CBC WITH AUTOMATED DIFF    Collection Time: 07/22/22 10:02 PM   Result Value Ref Range    WBC 20.2 (H) 4.1 - 11.1 K/uL    RBC 2.59 (L) 4.10 - 5.70 M/uL    HGB 7.3 (L) 12.1 - 17.0 g/dL    HCT 22.3 (L) 36.6 - 50.3 %    MCV 86.1 80.0 - 99.0 FL    MCH 28.2 26.0 - 34.0 PG    MCHC 32.7 30.0 - 36.5 g/dL    RDW 16.5 (H) 11.5 - 14.5 %    PLATELET 269 390 - 233 K/uL    MPV 9.6 8.9 - 12.9 FL    NRBC 0.0 0  WBC    ABSOLUTE NRBC 0.00 0.00 - 0.01 K/uL    NEUTROPHILS 85 (H) 32 - 75 %    LYMPHOCYTES 10 (L) 12 - 49 %    MONOCYTES 4 (L) 5 - 13 %    EOSINOPHILS 0 0 - 7 %    BASOPHILS 0 0 - 1 %    IMMATURE GRANULOCYTES 1 (H) 0.0 - 0.5 %    ABS. NEUTROPHILS 17.0 (H) 1.8 - 8.0 K/UL    ABS. LYMPHOCYTES 2.0 0.8 - 3.5 K/UL    ABS. MONOCYTES 0.9 0.0 - 1.0 K/UL    ABS. EOSINOPHILS 0.0 0.0 - 0.4 K/UL    ABS. BASOPHILS 0.1 0.0 - 0.1 K/UL    ABS. IMM. GRANS. 0.2 (H) 0.00 - 0.04 K/UL    DF AUTOMATED     METABOLIC PANEL, COMPREHENSIVE    Collection Time: 07/22/22 10:02 PM   Result Value Ref Range    Sodium 138 136 - 145 mmol/L    Potassium 4.2 3.5 - 5.1 mmol/L    Chloride 108 97 - 108 mmol/L    CO2 21 21 - 32 mmol/L    Anion gap 9 5 - 15 mmol/L    Glucose 135 (H) 65 - 100 mg/dL    BUN 96 (H) 6 - 20 MG/DL    Creatinine 1.97 (H) 0.70 - 1.30 MG/DL    BUN/Creatinine ratio 49 (H) 12 - 20      GFR est AA 41 (L) >60 ml/min/1.73m2    GFR est non-AA 34 (L) >60 ml/min/1.73m2    Calcium 9.6 8.5 - 10.1 MG/DL    Bilirubin, total 0.6 0.2 - 1.0 MG/DL    ALT (SGPT) 18 12 - 78 U/L    AST (SGOT) 16 15 - 37 U/L    Alk.  phosphatase 46 45 - 117 U/L    Protein, total 6.4 6.4 - 8.2 g/dL    Albumin 3.3 (L) 3.5 - 5.0 g/dL    Globulin 3.1 2.0 - 4.0 g/dL    A-G Ratio 1.1 1.1 - 2.2     TROPONIN-HIGH SENSITIVITY    Collection Time: 07/22/22 10:35 PM   Result Value Ref Range    Troponin-High Sensitivity 31 0 - 76 ng/L   NT-PRO BNP    Collection Time: 07/22/22 10:35 PM   Result Value Ref Range    NT pro- (H) <125 PG/ML   POC LACTIC ACID    Collection Time: 07/22/22 10:46 PM   Result Value Ref Range    Lactic Acid (POC) 2.26 (HH) 0.40 - 2.00 mmol/L   EKG, 12 LEAD, INITIAL    Collection Time: 07/22/22 10:57 PM   Result Value Ref Range    Ventricular Rate 80 BPM    Atrial Rate 80 BPM    P-R Interval 110 ms    QRS Duration 88 ms    Q-T Interval 342 ms    QTC Calculation (Bezet) 394 ms    Calculated P Axis 69 degrees    Calculated R Axis 55 degrees    Calculated T Axis -120 degrees    Diagnosis       Sinus rhythm with marked sinus arrhythmia with short MT  T wave abnormality, consider lateral ischemia  When compared with ECG of 19-OCT-2009 09:31,  MT interval has decreased  Nonspecific T wave abnormality now evident in Inferior leads  T wave inversion now evident in Lateral leads     SAMPLES BEING HELD    Collection Time: 07/22/22 11:26 PM   Result Value Ref Range    SAMPLES BEING HELD PST     COMMENT        Add-on orders for these samples will be processed based on acceptable specimen integrity and analyte stability, which may vary by analyte.    URINALYSIS W/ REFLEX CULTURE    Collection Time: 07/23/22 12:10 AM    Specimen: Urine   Result Value Ref Range    Color YELLOW/STRAW      Appearance CLEAR CLEAR      Specific gravity 1.018      pH (UA) 5.0 5.0 - 8.0      Protein TRACE (A) NEG mg/dL    Glucose Negative NEG mg/dL    Ketone Negative NEG mg/dL    Bilirubin Negative NEG      Blood Negative NEG      Urobilinogen 0.2 0.2 - 1.0 EU/dL    Nitrites Negative NEG      Leukocyte Esterase TRACE (A) NEG      WBC 5-10 0 - 4 /hpf    RBC 5-10 0 - 5 /hpf    Epithelial cells FEW FEW /lpf    Bacteria 2+ (A) NEG /hpf    UA:UC IF INDICATED CULTURE NOT INDICATED BY UA RESULT CNI     OCCULT BLOOD, STOOL    Collection Time: 07/23/22 12:10 AM   Result Value Ref Range    Occult blood, stool Positive (A) NEG     POC LACTIC ACID    Collection Time: 07/23/22  2:21 AM   Result Value Ref Range    Lactic Acid (POC) 1.52 0.40 - 2.00 mmol/L       Radiologic Studies - I have personally reviewed and interpreted (see MDM for brief interpreation of available results) all imaging studies and agree with radiology interpretation and report. 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 XR CHEST PORT   Final Result   No pulmonary infiltrate or other acute findings. Medical Decision Making   I am the first provider for this patient. Records Reviewed:   I reviewed our electronic medical record system for any past medical records that were available that may contribute to the patient's current condition, including their PMH, surgical history, social and family history. This includes most recent ED visits, any available discharge summaries and prior ECGs, which I have reviewed and interpreted personally. I have summarized most salient findings in my HPI and MDM. Thania Yen MD, MSc    I also reviewed the nursing notes and vital signs from today's visit. Nursing notes will be reviewed as they become available in realtime while the pt has been in the ED. Thania Yen MD, MSc      Vital Signs-Reviewed the patient's vital signs.   Patient Vitals for the past 24 hrs:   Temp Pulse Resp BP SpO2   07/23/22 0200 -- (!) 102 -- (!) 93/52 99 %   07/23/22 0125 98 °F (36.7 °C) 96 21 (!) 108/56 100 %   07/23/22 0100 -- 89 22 (!) 93/43 100 %   07/23/22 0032 97.8 °F (36.6 °C) 90 17 (!) 97/48 100 %   07/22/22 2308 98.7 °F (37.1 °C) 88 20 -- 99 %   07/22/22 2149 98 °F (36.7 °C) (!) 103 20 99/75 95 % ECG interpretation: Normal sinus rhythm with rate 80, Qtc is 394, normal intervals, and no changes concerning for acute ischemia. This ECG has been viewed and interpreted by me personally. Vanita Grossman MD, Msc    Cardiac Monitor: Cardiac monitor interpreted by me. The cardiac monitor revealed normal sinus rhythm. The cardiac monitor was ordered to monitor patient for signs of cardiac dysrhythmia, which they are at risk for based on their history or risk for cardiovascular disease and/or metabolic abnormalities. Vanita Grossman MD MSc    Pulse ox interpretation: 98% on ra with good pleth. Provider Notes (Medical Decision Making):     Patient is a 68 yo male who presents to the ED after developing rapidly worsening generalized weakness and fatigue. No other associated symptoms. No focal weakness/numbness, no speech changes, no confusion, no vision changes. No fever. In the ED appears very pale and overall ill but normal metal status. Neuro exam without focal findings. Abdomen is soft. Rectal exam performed showing brown stool, fec occult postive. He is hypotensive and intermittently slightly tachy. Normal o2 sat, rr and afebrile. Ddx: under consideration were sepsis, gi bleed, anemia, lower concern for cardiogenic shock given no clinical findings to support chf, severe dehydration, renal failure. Plan/intervention: full septic workup, cardiac markers, iv fluid resuscitation, type and screen. Iv protonix. Ct abd/pelv w contrast - gi bleed protocol. Reassessment:  I personally reviewed and interpreted the patient's laboratory and available imaging studies. Lactate elevated slightly at 2.26, leukocytosis. Lactate cleared after fluids. BP was only transiently responsive to fluids and after receiving 30cc/kg bolus his lactate cleared but BP still remained low. Started on pressors. Overall meets criteria for septic shock. Source may be urine given + bacteria.     Hgb is low, acute drop from last measurement. Hypotension may also be due to blood loss rather than underlying infection. Certainly both play a role. Given his dark/purplish stool today and +blood in stool, c/w upper gi bleed. Received protonix. Ct abd pelv shows no areas of active bleeding. Will repeat hgb but in meantime, will order transfusion as hgb after fluids has likely dropped. In addition, blood will likely help his blp. No Bms in the ED      ED Course:   Initial assessment performed. The patients presenting problems have been discussed, and they are in agreement with the care plan formulated and outlined with them. I have encouraged them to ask questions as they arise throughout their visit. ED [facility] ED SEPSIS NOTE:     10:52 PM The patient now meets criteria for: septic shock    Fluid resuscitation with: 30 mL/kg crystalloid bolus  Due to concern for rapidly advancing infection and deterioration of patient's condition, antibiotics are started STAT and cultures ordered. I've performed a sepsis reassessment of the patient's clinical volume status and tissue perfusion at time 00:42 AM        Consult Note:  Jose M Kohler MD spoke with ICU admitting team   Discussed pt's hx, physical exam and available diagnostic and imaging results. Reviewed care plans. Agree with management and plan thus far.  No other recommendations, will admit to their service    CRITICAL CARE NOTE :    IMPENDING DETERIORATION -Cardiovascular and Metabolic  ASSOCIATED RISK FACTORS - Hypotension and Shock  MANAGEMENT- Bedside Assessment and Supervision of Care  INTERPRETATION -  Xrays, CT Scan, ECG, Blood Pressure, and Cardiac Output Measures   INTERVENTIONS - hemodynamic mngmt and Metobolic interventions  CASE REVIEW - Hospitalist/Intensivist, Nursing, and Family  TREATMENT RESPONSE -Improved  PERFORMED BY - Self    NOTES   :    I have spent 105 minutes of critical care time involved in lab review, consultations with specialist, family decision- making, bedside attention and documentation. During this entire length of time I was immediately available to the patient . Critical Care: The reason for providing this level of medical care for this critically ill patient was due to a critical illness that impaired one or more vital organ systems, such that there was a high probability of imminent or life threatening deterioration in the patient's condition. This care involved high complexity decision making to assess, manipulate, and support vital system functions, to treat this degree of vital organ system failure, and to prevent further life threatening deterioration of the patients condition. Cleo Null MD      DISPOSITION: ADMIT  Patient is being admitted to the hospital.  Their test results and reasons for admission have been discussed. The patient and/or available family express agreement with and understanding of the need to be admitted and their admission diagnosis. Thank you for resuming the care of this patient. Please don't hesitate to contact me in the emergency department if you  have any additional questions. Cleo Null MD, MSc      Liv Raygoza MD, am the attending of record for this patient encounter. Diagnosis     Clinical Impression:   1. Acute anemia    2. Upper GI bleed    3. STEFAN (acute kidney injury) ([address]    4. Severe sepsis ([address]    5. Hypotension due to hypovolemia        Attestation:  I personally performed the services described in this documentation on this date 7/22/2022 for patient Armaan Duckworth.   Cleo Null MD

## 2022-07-23 NOTE — PROGRESS NOTES
Comprehensive Nutrition Assessment    Type and Reason for Visit: Initial, Positive nutrition screen    Nutrition Recommendations/Plan:   Initiate diet as medically able  Add Ensure clear with clear liquid diet  Please document % meals and supplements consumed in flowsheet I/O's under intake        Nutrition Assessment:     Chart review triggered by MST. Pt admitted with hemorrhagic shock d/t acute GIB blood loss anemia, acute renal failure. Hx of stroke, HTN and HLD. Pt reports no changes in his appetite or eating habits recently, and no changes in bowel regularity. He says he is very active and generally grazes, eating all day whenever he feels hungry. He does report that his clothes are too big but he does not pay attention to his weight. Weight loss noted per weight hx, not quite significant for the time frame. Advised pt to at least maintain his weight moving forward, but he states he is naturally thin like his father was and has trouble gaining weight. Will add supplements to go with clear liquid diet ordered after my visit. Wt Readings from Last 5 Encounters:   07/23/22 66.2 kg (145 lb 15.1 oz)   01/12/22 71.5 kg (157 lb 11.2 oz)   05/17/17 78.7 kg (173 lb 6.4 oz)   08/27/13 78.9 kg (174 lb)   ]    Nutrition Related Findings:    Labs: hgb 5.8, Cr 1.49. Meds: cefepime, protonix, vancomycin. BM PTA. Wound Type: None    Current Nutrition Intake & Therapies:  Average Meal Intake: NPO     ADULT DIET Clear Liquid  ADULT ORAL NUTRITION SUPPLEMENT Breakfast, Lunch, Dinner; Clear Liquid    Anthropometric Measures:  Height: 6' 2\" (188 cm)  Ideal Body Weight (IBW): 190 lbs (86 kg)     Current Body Wt:  66.2 kg (145 lb 15.1 oz), 76.8 % IBW.  Bed scale  Current BMI (kg/m2): 18.7        Weight Adjustment: No adjustment                 BMI Category: Underweight (BMI less than 22) age over 72    Estimated Daily Nutrient Needs:  Energy Requirements Based On: Formula  Weight Used for Energy Requirements: Current  Energy (kcal/day): 1930 kcals (BMR x 1. 3AF)  Weight Used for Protein Requirements: Current  Protein (g/day): 66-79g (1.0-1.2g/kg)  Method Used for Fluid Requirements: 1 ml/kcal  Fluid (ml/day): 1900mL    Nutrition Diagnosis:   Inadequate protein-energy intake related to altered GI function as evidenced by NPO or clear liquid status due to medical condition    Nutrition Interventions:   Food and/or Nutrient Delivery: Start oral diet, Start oral nutrition supplement  Nutrition Education/Counseling: No recommendations at this time  Coordination of Nutrition Care: Continue to monitor while inpatient       Goals:     Goals: Initiate PO diet, PO intake 50% or greater, by next RD assessment       Nutrition Monitoring and Evaluation:   Behavioral-Environmental Outcomes: None identified  Food/Nutrient Intake Outcomes: Diet advancement/tolerance, Food and nutrient intake, Supplement intake  Physical Signs/Symptoms Outcomes: Biochemical data, GI status, Weight    Discharge Planning:     Too soon to determine    Carlos Santiago, [address]  Contact: [phone]

## 2022-07-23 NOTE — ED NOTES
Bedside shift change report given to Radha Cordoba RN (oncoming nurse) by Rosie Mcclendon RN (offgoing nurse). Report included the following information SBAR, Kardex, ED Summary, MAR, and Recent Results. 12:15 AM: pt still in ct at this time. 2:01 AM: MD notified of pt bp/vs at this time. MD evaluating pt. 80: MD and ICU NP at bedside. 0300: ICU NP consented pt for receiving blood transfusion. 3:45 AM: Pt signed consent form for blood transfusion. 3:50 AM: ICU NP states to call GI consult during day shift. 4:14 AM: TRANSFER - OUT REPORT:    Verbal report given to Elizabeth(name) on Anatoly Inman  being transferred to CCU(unit) for routine progression of care       Report consisted of patients Situation, Background, Assessment and   Recommendations(SBAR). Information from the following report(s) SBAR, Kardex, ED Summary, MAR, and Recent Results was reviewed with the receiving nurse. Lines:   Peripheral IV 07/22/22 Left Antecubital (Active)   Site Assessment Clean, dry, & intact 07/22/22 2348   Phlebitis Assessment 0 07/22/22 2348   Infiltration Assessment 0 07/22/22 2348   Dressing Status Clean, dry, & intact 07/22/22 2348   Dressing Type 4 X 4;Transparent 07/22/22 2348   Hub Color/Line Status Pink;Flushed 07/22/22 2348       Peripheral IV 07/23/22 Right Wrist (Active)   Site Assessment Clean, dry, & intact 07/23/22 0125   Phlebitis Assessment 0 07/23/22 0125   Infiltration Assessment 0 07/23/22 0125   Dressing Status Clean, dry, & intact 07/23/22 0125   Dressing Type Transparent 07/23/22 0125   Hub Color/Line Status Pink 07/23/22 0125        Opportunity for questions and clarification was provided. Patient transported with:   Registered Nurse      4:18 AM: Attempted to call pt wife to notify her of pt transfer to CCU. No answer and unable to leave voicemail due to mailbox being full.

## 2022-07-23 NOTE — PROGRESS NOTES
0800 - Bedside report received [facility]. Patient alert and oriented x 4, moves all extrem, left upper extrem weaker than left. Unit of blood completed, levophed decreased, pt maintaining MAP > 65. Assessment documented. See flow sheets for details. 0830 - Dr. Keerthi Ardon returned consult. He will see patient. 36  - Dr. Tovar Drivers at bedside to see patient and to talk with patient's wife on the telephone. 1200 - Reassessment documented. No changes. See flow sheets for details. 1600 - Reassessment documented. No changes. See flow sheets for details. Family at bedside. Updated spouse. 1700 - Repeat CBC - hbg 7.7    End of Shift Note    Bedside shift change report given to CANDI Walton (oncoming nurse) by Miley Sal RN (offgoing nurse). Report included the following information SBAR, Kardex, Intake/Output, MAR, Recent Results, and Med Rec Status    Shift worked:  7a-7p     Shift summary and any significant changes:     none     Concerns for physician to address:  none     Zone phone for oncoming shift:   N/a       Activity:  Activity Level: Bed Rest  Number times ambulated in hallways past shift: 0  Number of times OOB to chair past shift: 0    Cardiac:   Cardiac Monitoring: Yes      Cardiac Rhythm: Sinus Rhythm    Access:   Current line(s): PIV     Genitourinary:   Urinary status: voiding    Respiratory:   O2 Device: None (Room air)  Chronic home O2 use?: NO  Incentive spirometer at bedside: NO       GI:  Last Bowel Movement Date:  (PTA)  Current diet:  ADULT ORAL NUTRITION SUPPLEMENT Breakfast, Lunch, Dinner; Clear Liquid  DIET NPO  ADULT DIET Clear Liquid  Passing flatus: YES  Tolerating current diet: YES       Pain Management:   Patient states pain is manageable on current regimen: YES    Skin:  Joseph Score: 19  Interventions: float heels    Patient Safety:  Fall Score:  Total Score: 2  Interventions: bed/chair alarm       Length of Stay:  Expected LOS: - - -  Actual LOS: 0      Laly MORALES Corie Galvez

## 2022-07-23 NOTE — CONSULTS
GI Consultation Note [facility])    NAME: Vic Bob : 1950 MRN: [id number]   PCP: Fiona Meraz MD  Date/Time:  2022 1:55 PM  Subjective:   REASON FOR CONSULT:   Anemia/? UGI bleed     Hunter Dixon is a 67 y.o.  male who I was asked to see for above. Pt w/ h/o CVA on ASA/Plavix who presented to ER with generalized weakness/fatigue. Pt found to be anemic in ED w/ ?dark stools. Pt denies this. No AP. Pt reports no NSAID use. Was admitted to ICU 2/2 hypotension requiring low dose vasopressor support      Past Medical History:   Diagnosis Date    Essential hypertension     Hyperlipidemia     Neurological disorder     Stroke [facility])       Past Surgical History:   Procedure Laterality Date    HX HERNIA REPAIR      HX HERNIA REPAIR      ND ABDOMEN SURGERY [address] UNLISTED  [id number]    colostomy, reversal     Social History     Tobacco Use    Smoking status: Former    Smokeless tobacco: Never   Substance Use Topics    Alcohol use: Yes     Comment: rarely      Family History   Problem Relation Age of Onset    Heart Attack Father       Allergies   Allergen Reactions    Pcn [Penicillins] Unknown (comments)    Sulfa (Sulfonamide Antibiotics) Other (comments)      Home Medications:  Prior to Admission Medications   Prescriptions Last Dose Informant Patient Reported? Taking?   aspirin delayed-release 81 mg tablet 2022  Yes Yes   Sig: Take 81 mg by mouth daily. atorvastatin (LIPITOR) 40 mg tablet 2022  No Yes   Sig: TAKE 1 TABLET BY MOUTH DAILY. REPEAT FASTING LABS 3-2-2022 BEFORE FURTHER REFILLS   clopidogreL (PLAVIX) 75 mg tab 2022  No Yes   Sig: Take 1 Tablet by mouth daily. coenzyme q10 10 mg cap   Yes Yes   Sig: Take  by mouth.   cyanocobalamin 1,000 mcg tablet 2022  Yes Yes   Sig: Take 1,000 mcg by mouth.   losartan (COZAAR) 25 mg tablet   No Yes   Sig: Take 1 Tablet by mouth daily.       Facility-Administered Medications: None     Hospital medications:  Current Facility-Administered Medications   Medication Dose Route Frequency    NOREPINephrine (LEVOPHED) 8 mg in 5% dextrose 250mL (32 mcg/mL) infusion  0.5-16 mcg/min IntraVENous TITRATE    sodium chloride (NS) flush 5-40 mL  5-40 mL IntraVENous Q8H    sodium chloride (NS) flush 5-40 mL  5-40 mL IntraVENous PRN    acetaminophen (TYLENOL) tablet 650 mg  650 mg Oral Q6H PRN    Or    acetaminophen (TYLENOL) suppository 650 mg  650 mg Rectal Q6H PRN    polyethylene glycol (MIRALAX) packet 17 g  17 g Oral DAILY PRN    pantoprazole (PROTONIX) 40 mg in 0.9% sodium chloride 10 mL injection  40 mg IntraVENous Q12H    0.9% sodium chloride infusion 250 mL  250 mL IntraVENous PRN    vancomycin (VANCOCIN) 1,000 mg in 0.9% sodium chloride 250 mL (VIAL-MATE)  1,000 mg IntraVENous Q18H    [START ON 2022] VANCOMYCIN INFORMATION NOTE   Other ONCE    cefepime (MAXIPIME) 2 g in 0.9% sodium chloride (MBP/ADV) 100 mL MBP  2 g IntraVENous Q12H     REVIEW OF SYSTEMS:     []     Unable to obtain  ROS due to  []    mental status change  []    sedated   []    intubated   [x]    Total of 11 systems reviewed as follows:  Const:  negative fever, negative chills, negative weight loss  Eyes:   negative diplopia or visual changes, negative eye pain  ENT:   negative coryza, negative sore throat  Resp:   negative cough, hemoptysis, dyspnea  Cards:  negative for chest pain, palpitations, lower extremity edema  :  negative for frequency, dysuria and hematuria  Skin:   negative for rash and pruritus  Heme:  negative for easy bruising and gum/nose bleeding  MS:  negative for myalgias, arthralgias, back pain and muscle weakness  Psych:  negative for feelings of anxiety, depression     Pertinent Positives include :    Objective:   VITALS:    Visit Vitals  BP (!) 103/52   Pulse 78   Temp 98.1 °F (36.7 °C)   Resp 18   Ht 6' 2\" (1.88 m)   Wt 66.2 kg (145 lb 15.1 oz)   SpO2 100%   BMI 18.74 kg/m²     Temp (24hrs), Av.2 °F (36.8 °C), Min:97.8 °F (36.6 °C), Max:98.8 °F (37.1 °C)    PHYSICAL EXAM:   General:    Alert, cooperative, no distress, appears stated age. Head:   Normocephalic, without obvious abnormality, atraumatic. Eyes:   Conjunctivae clear, anicteric sclerae. Pupils are equal  Nose:  Nares normal. No drainage or sinus tenderness. Throat:    Lips, mucosa, and tongue normal.  No Thrush  Neck:  Supple, symmetrical,  no adenopathy, thyroid: non tender  Back:    Symmetric,  No CVA tenderness. Lungs:   CTA bilaterally. No wheezing/rhonchi/rales. Chest wall:  No tenderness or deformity. No Accessory muscle use. Heart:   Regular rate and rhythm,  no murmur, rub or gallop. Abdomen:   Soft, non-tender. Not distended. Bowel sounds normal. No masses  Extremities: Atraumatic, No cyanosis. No edema. No clubbing  Skin:     Texture, turgor normal. No rashes/lesions/jaundice  Lymph: Cervical, supraclavicular normal.    LAB DATA REVIEWED:    Recent Results (from the past 48 hour(s))   CBC WITH AUTOMATED DIFF    Collection Time: 07/22/22 10:02 PM   Result Value Ref Range    WBC 20.2 (H) 4.1 - 11.1 K/uL    RBC 2.59 (L) 4.10 - 5.70 M/uL    HGB 7.3 (L) 12.1 - 17.0 g/dL    HCT 22.3 (L) 36.6 - 50.3 %    MCV 86.1 80.0 - 99.0 FL    MCH 28.2 26.0 - 34.0 PG    MCHC 32.7 30.0 - 36.5 g/dL    RDW 16.5 (H) 11.5 - 14.5 %    PLATELET 035 255 - 676 K/uL    MPV 9.6 8.9 - 12.9 FL    NRBC 0.0 0  WBC    ABSOLUTE NRBC 0.00 0.00 - 0.01 K/uL    NEUTROPHILS 85 (H) 32 - 75 %    LYMPHOCYTES 10 (L) 12 - 49 %    MONOCYTES 4 (L) 5 - 13 %    EOSINOPHILS 0 0 - 7 %    BASOPHILS 0 0 - 1 %    IMMATURE GRANULOCYTES 1 (H) 0.0 - 0.5 %    ABS. NEUTROPHILS 17.0 (H) 1.8 - 8.0 K/UL    ABS. LYMPHOCYTES 2.0 0.8 - 3.5 K/UL    ABS. MONOCYTES 0.9 0.0 - 1.0 K/UL    ABS. EOSINOPHILS 0.0 0.0 - 0.4 K/UL    ABS. BASOPHILS 0.1 0.0 - 0.1 K/UL    ABS. IMM.  GRANS. 0.2 (H) 0.00 - 0.04 K/UL    DF AUTOMATED     METABOLIC PANEL, COMPREHENSIVE    Collection Time: 07/22/22 10:02 PM   Result Value Ref Range    Sodium 138 136 - 145 mmol/L    Potassium 4.2 3.5 - 5.1 mmol/L    Chloride 108 97 - 108 mmol/L    CO2 21 21 - 32 mmol/L    Anion gap 9 5 - 15 mmol/L    Glucose 135 (H) 65 - 100 mg/dL    BUN 96 (H) 6 - 20 MG/DL    Creatinine 1.97 (H) 0.70 - 1.30 MG/DL    BUN/Creatinine ratio 49 (H) 12 - 20      GFR est AA 41 (L) >60 ml/min/1.73m2    GFR est non-AA 34 (L) >60 ml/min/1.73m2    Calcium 9.6 8.5 - 10.1 MG/DL    Bilirubin, total 0.6 0.2 - 1.0 MG/DL    ALT (SGPT) 18 12 - 78 U/L    AST (SGOT) 16 15 - 37 U/L    Alk.  phosphatase 46 45 - 117 U/L    Protein, total 6.4 6.4 - 8.2 g/dL    Albumin 3.3 (L) 3.5 - 5.0 g/dL    Globulin 3.1 2.0 - 4.0 g/dL    A-G Ratio 1.1 1.1 - 2.2     CULTURE, BLOOD    Collection Time: 07/22/22 10:35 PM    Specimen: Blood   Result Value Ref Range    Special Requests: NO SPECIAL REQUESTS      Culture result: NO GROWTH AFTER 9 HOURS     TROPONIN-HIGH SENSITIVITY    Collection Time: 07/22/22 10:35 PM   Result Value Ref Range    Troponin-High Sensitivity 31 0 - 76 ng/L   NT-PRO BNP    Collection Time: 07/22/22 10:35 PM   Result Value Ref Range    NT pro- (H) <125 PG/ML   POC LACTIC ACID    Collection Time: 07/22/22 10:46 PM   Result Value Ref Range    Lactic Acid (POC) 2.26 (HH) 0.40 - 2.00 mmol/L   CULTURE, BLOOD    Collection Time: 07/22/22 10:50 PM    Specimen: Blood   Result Value Ref Range    Special Requests: NO SPECIAL REQUESTS      Culture result: NO GROWTH AFTER 9 HOURS     EKG, 12 LEAD, INITIAL    Collection Time: 07/22/22 10:57 PM   Result Value Ref Range    Ventricular Rate 80 BPM    Atrial Rate 80 BPM    P-R Interval 110 ms    QRS Duration 88 ms    Q-T Interval 342 ms    QTC Calculation (Bezet) 394 ms    Calculated P Axis 69 degrees    Calculated R Axis 55 degrees    Calculated T Axis -120 degrees    Diagnosis       Sinus rhythm with marked sinus arrhythmia with short CO  T wave abnormality, consider lateral ischemia  When compared with ECG of 19-OCT-2009 09:31,  CO interval has decreased  Nonspecific T wave abnormality now evident in Inferior leads  T wave inversion now evident in Lateral leads  Confirmed by Priscilla Koyanagi ([id number]) on 7/23/2022 11:40:23 AM     FOLATE    Collection Time: 07/22/22 11:26 PM   Result Value Ref Range    Folate 35.9 (H) 5.0 - 21.0 ng/mL   VITAMIN B12    Collection Time: 07/22/22 11:26 PM   Result Value Ref Range    Vitamin B12 698 193 - 986 pg/mL   SAMPLES BEING HELD    Collection Time: 07/22/22 11:26 PM   Result Value Ref Range    SAMPLES BEING HELD PST     COMMENT        Add-on orders for these samples will be processed based on acceptable specimen integrity and analyte stability, which may vary by analyte.    IRON PROFILE    Collection Time: 07/22/22 11:26 PM   Result Value Ref Range    Iron 55 35 - 150 ug/dL    TIBC 322 250 - 450 ug/dL    Iron % saturation 17 (L) 20 - 50 %   URINALYSIS W/ REFLEX CULTURE    Collection Time: 07/23/22 12:10 AM    Specimen: Urine   Result Value Ref Range    Color YELLOW/STRAW      Appearance CLEAR CLEAR      Specific gravity 1.018      pH (UA) 5.0 5.0 - 8.0      Protein TRACE (A) NEG mg/dL    Glucose Negative NEG mg/dL    Ketone Negative NEG mg/dL    Bilirubin Negative NEG      Blood Negative NEG      Urobilinogen 0.2 0.2 - 1.0 EU/dL    Nitrites Negative NEG      Leukocyte Esterase TRACE (A) NEG      WBC 5-10 0 - 4 /hpf    RBC 5-10 0 - 5 /hpf    Epithelial cells FEW FEW /lpf    Bacteria 2+ (A) NEG /hpf    UA:UC IF INDICATED CULTURE NOT INDICATED BY UA RESULT CNI     OCCULT BLOOD, STOOL    Collection Time: 07/23/22 12:10 AM   Result Value Ref Range    Occult blood, stool Positive (A) NEG     TYPE & SCREEN    Collection Time: 07/23/22 12:31 AM   Result Value Ref Range    Crossmatch Expiration 07/26/2022,2359     ABO/Rh(D) O POSITIVE     Antibody screen NEG     Unit number [id number]     Blood component type  LR,1     Unit division 00     Status of unit ISSUED     Crossmatch result Compatible     Unit number [id number]     Blood component type  LR Unit division 00     Status of unit ISSUED     Crossmatch result Compatible    POC LACTIC ACID    Collection Time: 07/23/22  2:21 AM   Result Value Ref Range    Lactic Acid (POC) 1.52 0.40 - 2.00 mmol/L   BLOOD GAS,CHEM8,LACTIC ACID POC    Collection Time: 07/23/22  2:59 AM   Result Value Ref Range    Calcium, ionized (POC) 1.27 1.12 - 1.32 mmol/L    BICARBONATE 20 mmol/L    Base deficit (POC) 4.5 mmol/L    Sample source VENOUS BLOOD      CO2, POC 20 19 - 24 MMOL/L    Sodium,  136 - 145 MMOL/L    Potassium, POC 3.9 3.5 - 5.5 MMOL/L    Chloride,  100 - 108 MMOL/L    Glucose,  74 - 106 MG/DL    Creatinine, POC 1.8 (H) 0.6 - 1.3 MG/DL    pH, venous (POC) 7.43 (H) 7.32 - 7.42      pCO2, venous (POC) 29.5 (L) 41 - 51 MMHG    pO2, venous (POC) 40 25 - 40 mmHg   COVID-19 RAPID TEST    Collection Time: 07/23/22  3:06 AM   Result Value Ref Range    Specimen source Nasopharyngeal      COVID-19 rapid test Not detected NOTD     METABOLIC PANEL, BASIC    Collection Time: 07/23/22  3:06 AM   Result Value Ref Range    Sodium 140 136 - 145 mmol/L    Potassium 4.0 3.5 - 5.1 mmol/L    Chloride 110 (H) 97 - 108 mmol/L    CO2 21 21 - 32 mmol/L    Anion gap 9 5 - 15 mmol/L    Glucose 113 (H) 65 - 100 mg/dL    BUN 84 (H) 6 - 20 MG/DL    Creatinine 1.49 (H) 0.70 - 1.30 MG/DL    BUN/Creatinine ratio 56 (H) 12 - 20      GFR est AA 56 (L) >60 ml/min/1.73m2    GFR est non-AA 46 (L) >60 ml/min/1.73m2    Calcium 8.5 8.5 - 10.1 MG/DL   CBC WITH AUTOMATED DIFF    Collection Time: 07/23/22  3:06 AM   Result Value Ref Range    WBC 16.5 (H) 4.1 - 11.1 K/uL    RBC 2.06 (L) 4.10 - 5.70 M/uL    HGB 5.8 (LL) 12.1 - 17.0 g/dL    HCT 17.7 (LL) 36.6 - 50.3 %    MCV 85.9 80.0 - 99.0 FL    MCH 28.2 26.0 - 34.0 PG    MCHC 32.8 30.0 - 36.5 g/dL    RDW 16.6 (H) 11.5 - 14.5 %    PLATELET 347 386 - 596 K/uL    MPV 9.6 8.9 - 12.9 FL    NRBC 0.0 0  WBC    ABSOLUTE NRBC 0.00 0.00 - 0.01 K/uL    NEUTROPHILS 76 (H) 32 - 75 %    LYMPHOCYTES 16 12 - 49 %    MONOCYTES 7 5 - 13 %    EOSINOPHILS 0 0 - 7 %    BASOPHILS 0 0 - 1 %    IMMATURE GRANULOCYTES 1 (H) 0.0 - 0.5 %    ABS. NEUTROPHILS 12.5 (H) 1.8 - 8.0 K/UL    ABS. LYMPHOCYTES 2.6 0.8 - 3.5 K/UL    ABS. MONOCYTES 1.1 (H) 0.0 - 1.0 K/UL    ABS. EOSINOPHILS 0.0 0.0 - 0.4 K/UL    ABS. BASOPHILS 0.1 0.0 - 0.1 K/UL    ABS. IMM. GRANS. 0.1 (H) 0.00 - 0.04 K/UL    DF AUTOMATED     MAGNESIUM    Collection Time: 07/23/22  3:06 AM   Result Value Ref Range    Magnesium 1.8 1.6 - 2.4 mg/dL   PHOSPHORUS    Collection Time: 07/23/22  3:06 AM   Result Value Ref Range    Phosphorus 3.0 2.6 - 4.7 MG/DL   RBC, ALLOCATE    Collection Time: 07/23/22  4:00 AM   Result Value Ref Range    HISTORY CHECKED? Historical check performed    METABOLIC PANEL, BASIC    Collection Time: 07/23/22 10:10 AM   Result Value Ref Range    Sodium 141 136 - 145 mmol/L    Potassium 3.7 3.5 - 5.1 mmol/L    Chloride 112 (H) 97 - 108 mmol/L    CO2 23 21 - 32 mmol/L    Anion gap 6 5 - 15 mmol/L    Glucose 96 65 - 100 mg/dL    BUN 63 (H) 6 - 20 MG/DL    Creatinine 1.19 0.70 - 1.30 MG/DL    BUN/Creatinine ratio 53 (H) 12 - 20      GFR est AA >60 >60 ml/min/1.73m2    GFR est non-AA >60 >60 ml/min/1.73m2    Calcium 8.7 8.5 - 10.1 MG/DL   CBC WITH AUTOMATED DIFF    Collection Time: 07/23/22 10:10 AM   Result Value Ref Range    WBC 12.0 (H) 4.1 - 11.1 K/uL    RBC 2.37 (L) 4.10 - 5.70 M/uL    HGB 6.8 (L) 12.1 - 17.0 g/dL    HCT 20.7 (L) 36.6 - 50.3 %    MCV 87.3 80.0 - 99.0 FL    MCH 28.7 26.0 - 34.0 PG    MCHC 32.9 30.0 - 36.5 g/dL    RDW 16.1 (H) 11.5 - 14.5 %    PLATELET 669 925 - 337 K/uL    MPV 9.4 8.9 - 12.9 FL    NRBC 0.0 0  WBC    ABSOLUTE NRBC 0.00 0.00 - 0.01 K/uL    NEUTROPHILS 77 (H) 32 - 75 %    LYMPHOCYTES 13 12 - 49 %    MONOCYTES 8 5 - 13 %    EOSINOPHILS 1 0 - 7 %    BASOPHILS 0 0 - 1 %    IMMATURE GRANULOCYTES 1 (H) 0.0 - 0.5 %    ABS. NEUTROPHILS 9.2 (H) 1.8 - 8.0 K/UL    ABS. LYMPHOCYTES 1.6 0.8 - 3.5 K/UL    ABS.  MONOCYTES 0.9 0.0 - 1.0 K/UL    ABS. EOSINOPHILS 0.1 0.0 - 0.4 K/UL    ABS. BASOPHILS 0.1 0.0 - 0.1 K/UL    ABS. IMM. GRANS. 0.1 (H) 0.00 - 0.04 K/UL    DF AUTOMATED     MAGNESIUM    Collection Time: 07/23/22 10:10 AM   Result Value Ref Range    Magnesium 1.9 1.6 - 2.4 mg/dL   PHOSPHORUS    Collection Time: 07/23/22 10:10 AM   Result Value Ref Range    Phosphorus 3.1 2.6 - 4.7 MG/DL   SAMPLES BEING HELD    Collection Time: 07/23/22 10:10 AM   Result Value Ref Range    SAMPLES BEING HELD  PST     COMMENT        Add-on orders for these samples will be processed based on acceptable specimen integrity and analyte stability, which may vary by analyte.      IMAGING RESULTS:   []      I have personally reviewed the actual   []    CXR  []    CT  []     US    Assessment/Plan:      Active Problems:    Acute blood loss anemia (7/23/2022)      Upper GI bleed (7/23/2022)      Sepsis ([address] (7/23/2022)    H/o CVA on Plavix/ASA, last dose 7/22  Acute anemia  Dark stools/+FOBT  On low dose vasopressor support, this does not seem to be from significant acute GI bleed  ___________________________________________________  RECOMMENDATIONS:    - ok for CLD  - BID PPI  - EGD, likely Monday; NPO after midnight for this    ___________________________________________________  Care Plan discussed with:    [x]    Patient   []    Family   [x]    Nursing   []    Attending  T   ___________________________________________________  GI: Sendy Smyth MD

## 2022-07-23 NOTE — PROGRESS NOTES
~[id number] Patient arrived to CCU 2512. Dual skin assessment completed by Valentin Tian RN and Meir Roberson RN. No pressure injuries present on admission. Patient bathed, heels floating. Patient alert and oriented x4. Oriented patient to call bell system.

## 2022-07-23 NOTE — PROGRESS NOTES
ABX for UTI    Dosing adjustments per Renal dosing protocol    Levoflox.  750mg Iv q24h to   750mg IV q48h  Cefepime 2gm IV q8h to          2Gm Iv q24h

## 2022-07-23 NOTE — PROGRESS NOTES
I spoke with pt regarding consent for blood transfusion due to his acute anemia and gi bleed. Explained risks and benefits of blood transfusion. Pt agrees to blood transfusion.     Norris Crespo NP  [facility]  7/23/22

## 2022-07-23 NOTE — PROGRESS NOTES
ABX for UTI    Dosing adjustments per Renal dosing protocol    Levoflox.  750mg Iv q24h to   750mg IV q48h  Cefepime 2gm IV q8h to          2Gm Iv q12h

## 2022-07-23 NOTE — PROGRESS NOTES
Pharmacy Antimicrobial Kinetic Dosing    Indication for Antimicrobials: Sepsis     Current Regimen of Each Antimicrobial:  Vancomycin 1500mg IV x1 then 500mg Iv q12h   (Start Date ; Day # 2  Cefepime 2gm IV q24h  (Start date ; Day #2      Previous Antimicrobial Therapy:      Goal Level: AUC: 400-600 mg/hr/Liter/day    Date Dose & Interval Measured (mcg/mL) Predicted AUC/BEAR                       Date & time of next level:  0100    Dosing calculator used: Odimax calculator    Significant Positive Cultures:    - Blood- pending    Conditions for Dosing Consideration: None    Labs:  Recent Labs     22  1010 22  0306 222   CREA 1.19 1.49* 1.97*   BUN 63* 84* 96*     Recent Labs     22  1010 22  0306 222   WBC 12.0* 16.5* 20.2*     Temp (24hrs), Av.3 °F (36.8 °C), Min:97.8 °F (36.6 °C), Max:98.8 °F (37.1 °C)    Creatinine Clearance (mL/min):   CrCl (Ideal Body Weight): 65.2   If actual weight < IBW: CrCl (Actual Body Weight) 52.5    Impression/Plan:   Vancomycin 1500mg Iv x1 then 500mg IV q12h for predicted  mg*hr/L and trough 17.7 mcg/ml. However, the Scr improved and will adjust the vancomycin to 1 g q 18 hours for an estimated AUC/trough of 549/17  Will adjust the cefepime to 2g q 12 hours  Afebrile  WBC trending down   Scr trending down  BMP daily   Antimicrobial stop date-   tbd     Pharmacy will follow daily and adjust medications as appropriate for renal function and/or serum levels.     Thank you,  Dave Crawford, PHARMD    Vancomycin Dosing Document    Documents located on pharmacy Teams site: Clinical Practice -> Antimicrobial Stewardship -> Antibiotics_Vancomycin     Aminoglycoside Dosing Document    Documents located on pharmacy Teams site: Clinical Practice -> Antimicrobial Stewardship -> Antibiotics_Aminoglycosides

## 2022-07-23 NOTE — H&P
[facility] INITIAL ASSESSMENT. Name: Gareth Angulo   : 1950   MRN: [id number]   Date: 2022        Chief Complaint   Patient presents with    Fatigue     Patient to triage w family w c/o being weak onset coming from [facility]. Family reports patient had a stroke in  and is showing symptoms. HPI:     Mr. Ike Jacobs is a 66-year-old  male with history of stroke, on aspirin and Plavix for secondary prevention, hypertension and hyperlipidemia. He was brought to emergency department with complaint of worsening generalized weakness and fatigue. He went to a trip with the family to [place] and on the way back he developed extreme fatigue and also noticed dark-colored stool. On lab work he was found to have hemoglobin of 7.3 and later dropped down to 5.8. He was also hypotensive required low-dose vasopressors. He is currently on 1 ping of Levophed. He has received 1 unit of blood. He is admitted to ICU for further care and monitoring. He now reports feeling a little better. He denies any chest pain, shortness of breath, fever, recent change in urinary habits, denies abdominal pain, leg swelling, leg pain or edema. Problem list:     -Hemorrhagic shock. - Acute blood loss anemia.  - GI bleed. Likely upper GI bleed. - Acute renal failure. - History of stroke. - History of hypertension.  - Hyperlipidemia. Assessment/Plan:     Hemorrhagic shock.  - Likely secondary to GI bleed. - Titrate vasopressors for goal map of more than 65. - Blood transfusion for goal hemoglobin more than 7.  - Consulted GI already.  - Closely monitor hemodynamics and markers of endorgan perfusion including mental status, urine output and lactic acid.  -On empiric vanc and cefepime for possible sepsis, will likely appear off tomorrow if clinically improves. Acute blood loss anemia:  -Transfuse PRBC for Hb <7.  -Monitor H/H Q6hrs. GIB:  -Likely UGIB.   -Consulted GI.  -Monitor H/H -Continue protonix.  -Holding ASA and plavix for now.  -NPO. Acute renal failure.  -Likely pre renal/ATN etilogy.  -Closely monitor renal functions and UO. -Avoid nephrotoxins.  -Maintain MAP >65 and avoid rapid fluctuations in BP. H/o HTN. -Hold BP meds. DVT prophylaxis. SCD. CODE STATUS. Full code. Plan was discussed in detail with patient and his wife over the phone. I personally spent 60 minutes of critical care time. This is time spent at this critically ill patient's bedside actively involved in patient care as well as the coordination of care and discussions with the patient's family. This does not include any procedural time which has been billed separately. Review of systems:     Review of Systems   All other systems reviewed and are negative. Objective:     Vital Signs:  Visit Vitals  BP (!) 97/56   Pulse 78   Temp 98.2 °F (36.8 °C)   Resp 15   Ht 6' 2\" (1.88 m)   Wt 66.2 kg (145 lb 15.1 oz)   SpO2 98%   BMI 18.74 kg/m²      O2 Device: None (Room air) Temp (24hrs), Av.3 °F (36.8 °C), Min:97.8 °F (36.6 °C), Max:98.8 °F (37.1 °C)           Intake/Output:     Intake/Output Summary (Last 24 hours) at 2022 1006  Last data filed at 2022 0800  Gross per 24 hour   Intake 3419.92 ml   Output 250 ml   Net 3169.92 ml       Physical Exam:  Physical Exam  Constitutional:       Appearance: Normal appearance. HENT:      Head: Normocephalic and atraumatic. Mouth/Throat:      Mouth: Mucous membranes are moist.   Eyes:      Extraocular Movements: Extraocular movements intact. Conjunctiva/sclera: Conjunctivae normal.   Cardiovascular:      Rate and Rhythm: Normal rate and regular rhythm. Pulmonary:      Effort: Pulmonary effort is normal. No respiratory distress. Breath sounds: Normal breath sounds. Abdominal:      General: Abdomen is flat. There is no distension. Palpations: Abdomen is soft. Tenderness: There is no abdominal tenderness. There is no guarding. Musculoskeletal:      Cervical back: Normal range of motion and neck supple. Neurological:      Mental Status: He is alert and oriented to person, place, and time. Past Medical History:        has a past medical history of Essential hypertension, Hyperlipidemia, Neurological disorder, and Stroke ([facility] [address] He has no past medical history of Asthma, Atrial fibrillation ([address] CAD (coronary artery disease), Cancer ([address] Carotid artery disease ([address] Chronic kidney disease, Chronic obstructive pulmonary disease ([address] Clotting disorder ([address] Congestive heart failure ([address] Diabetes ([address] Glaucoma, Hepatitis, HIV (human immunodeficiency virus infection) ([facility] [address] ICD (implantable cardioverter-defibrillator) in place, Long term current use of anticoagulant therapy, Murmur, Myocardial infarction ([facility] [address] Pacemaker, Pulmonary embolism ([facility] [address] Rheumatic fever, Seizures ([address] Syncope, Thyroid disease, or Valvular heart disease. Past Surgical History:      has a past surgical history that includes pr abdomen surgery proc unlisted (8846,6409); hx hernia repair (2009); and hx hernia repair (2007). Home Medications:     Prior to Admission medications    Medication Sig Start Date End Date Taking? Authorizing Provider   coenzyme q10 10 mg cap Take  by mouth. Yes Provider, Historical   atorvastatin (LIPITOR) 40 mg tablet TAKE 1 TABLET BY MOUTH DAILY. REPEAT FASTING LABS 3-2-2022 BEFORE FURTHER REFILLS 2/15/22  Yes Paulette Kan MD   clopidogreL (PLAVIX) 75 mg tab Take 1 Tablet by mouth daily. 2/14/22  Yes Paulette Kan MD   losartan (COZAAR) 25 mg tablet Take 1 Tablet by mouth daily. 2/14/22  Yes Paulette Kan MD   aspirin delayed-release 81 mg tablet Take 81 mg by mouth daily. 12/2/21  Yes Provider, Historical   cyanocobalamin 1,000 mcg tablet Take 1,000 mcg by mouth. Yes Provider, Historical         Allergies/Social/Family History:      Allergies   Allergen Reactions    Pcn [Penicillins] Unknown (comments)    Sulfa (Sulfonamide Antibiotics) Other (comments)      Social History     Tobacco Use    Smoking status: Former    Smokeless tobacco: Never   Substance Use Topics    Alcohol use: Yes     Comment: rarely      Family History   Problem Relation Age of Onset    Heart Attack Father          LABS AND  DATA:   Reviewed      Peak airway pressure:      Minute ventilation:        CRITICAL CARE CONSULTANT NOTE  I had a face to face encounter with the patient, reviewed and interpreted patient data including clinical events, labs, images, vital signs, I/O's, and examined patient. I have discussed the case and the plan and management of the patient's care with the consulting services, the bedside nurses and the respiratory therapist.      NOTE OF PERSONAL INVOLVEMENT IN CARE   This patient has a high probability of imminent, clinically significant deterioration, which requires the highest level of preparedness to intervene urgently. I participated in the decision-making and personally managed or directed the management of the following life and organ supporting interventions that required my frequent assessment to treat or prevent imminent deterioration.         Catrachita Riojas MD  Pulmonary and critical care  7/23/2022

## 2022-07-24 ENCOUNTER — APPOINTMENT (OUTPATIENT)
Dept: NON INVASIVE DIAGNOSTICS | Age: 72
DRG: 377 | End: 2022-07-24
Attending: HOSPITALIST
Payer: MEDICARE

## 2022-07-24 LAB
ABO + RH BLD: NORMAL
ACC. NO. FROM MICRO ORDER, ACCP: ABNORMAL
ACINETOBACTER CALCOACETICUS-BAUMANII COMPLEX, ACBCX: NOT DETECTED
ANION GAP SERPL CALC-SCNC: 7 MMOL/L (ref 5–15)
BACTEROIDES FRAGILIS, BFRA: NOT DETECTED
BASOPHILS # BLD: 0.1 K/UL (ref 0–0.1)
BASOPHILS NFR BLD: 1 % (ref 0–1)
BIOFIRE COMMENT, BCIDPF: ABNORMAL
BLD PROD TYP BPU: NORMAL
BLD PROD TYP BPU: NORMAL
BLOOD GROUP ANTIBODIES SERPL: NORMAL
BPU ID: NORMAL
BPU ID: NORMAL
BUN SERPL-MCNC: 30 MG/DL (ref 6–20)
BUN/CREAT SERPL: 36 (ref 12–20)
C GLABRATA DNA VAG QL NAA+PROBE: NOT DETECTED
CALCIUM SERPL-MCNC: 8.6 MG/DL (ref 8.5–10.1)
CANDIDA ALBICANS: NOT DETECTED
CANDIDA AURIS, CAAU: NOT DETECTED
CANDIDA KRUSEI, CKRP: NOT DETECTED
CANDIDA PARAPSILOSIS, CPAUP: NOT DETECTED
CANDIDA TROPICALIS, CTROP: NOT DETECTED
CHLORIDE SERPL-SCNC: 112 MMOL/L (ref 97–108)
CO2 SERPL-SCNC: 23 MMOL/L (ref 21–32)
COMMENT, HOLDF: NORMAL
CREAT SERPL-MCNC: 0.83 MG/DL (ref 0.7–1.3)
CROSSMATCH RESULT,%XM: NORMAL
CROSSMATCH RESULT,%XM: NORMAL
CRYPTO NEOFORMANS/GATTII, CRYNEG: NOT DETECTED
DIFFERENTIAL METHOD BLD: ABNORMAL
ENTEROBACTER CLOACAE COMPLEX, ECCP: NOT DETECTED
ENTEROBACTERALES SP. , ENBLS: NOT DETECTED
ENTEROCOCCUS FAECALIS, ENFA: NOT DETECTED
ENTEROCOCCUS FAECIUM, ENFAM: NOT DETECTED
EOSINOPHIL # BLD: 0.2 K/UL (ref 0–0.4)
EOSINOPHIL NFR BLD: 2 % (ref 0–7)
ERYTHROCYTE [DISTWIDTH] IN BLOOD BY AUTOMATED COUNT: 16 % (ref 11.5–14.5)
ESCHERICHIA COLI: NOT DETECTED
GLUCOSE SERPL-MCNC: 93 MG/DL (ref 65–100)
HAEMOPHILUS INFLUENZAE, HMI: NOT DETECTED
HCT VFR BLD AUTO: 24.3 % (ref 36.6–50.3)
HGB BLD-MCNC: 7.8 G/DL (ref 12.1–17)
HGB BLD-MCNC: 7.9 G/DL (ref 12.1–17)
HGB BLD-MCNC: 8.1 G/DL (ref 12.1–17)
HGB BLD-MCNC: 8.9 G/DL (ref 12.1–17)
IMM GRANULOCYTES # BLD AUTO: 0.1 K/UL (ref 0–0.04)
IMM GRANULOCYTES NFR BLD AUTO: 1 % (ref 0–0.5)
KLEBSIELLA AEROGENES, KLAE: NOT DETECTED
KLEBSIELLA OXYTOCA: NOT DETECTED
KLEBSIELLA PNEUMONIAE GROUP, KPPG: NOT DETECTED
LISTERIA MONOCYTOGENES, LMONP: NOT DETECTED
LYMPHOCYTES # BLD: 1.6 K/UL (ref 0.8–3.5)
LYMPHOCYTES NFR BLD: 14 % (ref 12–49)
MAGNESIUM SERPL-MCNC: 1.9 MG/DL (ref 1.6–2.4)
MCH RBC QN AUTO: 28.5 PG (ref 26–34)
MCHC RBC AUTO-ENTMCNC: 32.5 G/DL (ref 30–36.5)
MCV RBC AUTO: 87.7 FL (ref 80–99)
MONOCYTES # BLD: 1.1 K/UL (ref 0–1)
MONOCYTES NFR BLD: 9 % (ref 5–13)
NEISSERIA MENINGITIDIS, NMNI: NOT DETECTED
NEUTS SEG # BLD: 8.6 K/UL (ref 1.8–8)
NEUTS SEG NFR BLD: 73 % (ref 32–75)
NRBC # BLD: 0 K/UL (ref 0–0.01)
NRBC BLD-RTO: 0 PER 100 WBC
PHOSPHATE SERPL-MCNC: 2.2 MG/DL (ref 2.6–4.7)
PLATELET # BLD AUTO: 226 K/UL (ref 150–400)
PMV BLD AUTO: 9 FL (ref 8.9–12.9)
POTASSIUM SERPL-SCNC: 3.5 MMOL/L (ref 3.5–5.1)
PROTEUS, PRP: NOT DETECTED
PSEUDOMONAS AERUGINOSA: NOT DETECTED
RBC # BLD AUTO: 2.77 M/UL (ref 4.1–5.7)
RBC MORPH BLD: ABNORMAL
RESISTANT GENE SPACE, REGENE: ABNORMAL
SALMONELLA, SALMO: NOT DETECTED
SAMPLES BEING HELD,HOLD: NORMAL
SERRATIA MARCESCENS: NOT DETECTED
SODIUM SERPL-SCNC: 142 MMOL/L (ref 136–145)
SPECIMEN EXP DATE BLD: NORMAL
STAPH EPIDERMIDIS, STEP: NOT DETECTED
STAPH LUGDUNENSIS, STALUG: NOT DETECTED
STAPHYLOCOCCUS AUREUS: NOT DETECTED
STAPHYLOCOCCUS, STAPP: DETECTED
STATUS OF UNIT,%ST: NORMAL
STATUS OF UNIT,%ST: NORMAL
STENO MALTOPHILIA, STMA: NOT DETECTED
STREPTOCOCCUS , STPSP: DETECTED
STREPTOCOCCUS AGALACTIAE (GROUP B): NOT DETECTED
STREPTOCOCCUS PNEUMONIAE , SPNP: NOT DETECTED
STREPTOCOCCUS PYOGENES (GROUP A), SPYOP: NOT DETECTED
UNIT DIVISION, %UDIV: 0
UNIT DIVISION, %UDIV: 0
WBC # BLD AUTO: 11.7 K/UL (ref 4.1–11.1)

## 2022-07-24 PROCEDURE — 74011000250 HC RX REV CODE- 250: Performed by: NURSE PRACTITIONER

## 2022-07-24 PROCEDURE — C9113 INJ PANTOPRAZOLE SODIUM, VIA: HCPCS | Performed by: NURSE PRACTITIONER

## 2022-07-24 PROCEDURE — 74011250636 HC RX REV CODE- 250/636: Performed by: NURSE PRACTITIONER

## 2022-07-24 PROCEDURE — 84100 ASSAY OF PHOSPHORUS: CPT

## 2022-07-24 PROCEDURE — 65270000046 HC RM TELEMETRY

## 2022-07-24 PROCEDURE — 93306 TTE W/DOPPLER COMPLETE: CPT

## 2022-07-24 PROCEDURE — 74011250636 HC RX REV CODE- 250/636: Performed by: HOSPITALIST

## 2022-07-24 PROCEDURE — 36415 COLL VENOUS BLD VENIPUNCTURE: CPT

## 2022-07-24 PROCEDURE — 74011000258 HC RX REV CODE- 258: Performed by: HOSPITALIST

## 2022-07-24 PROCEDURE — 30233N1 TRANSFUSION OF NONAUTOLOGOUS RED BLOOD CELLS INTO PERIPHERAL VEIN, PERCUTANEOUS APPROACH: ICD-10-PCS | Performed by: HOSPITALIST

## 2022-07-24 PROCEDURE — 80048 BASIC METABOLIC PNL TOTAL CA: CPT

## 2022-07-24 PROCEDURE — 83735 ASSAY OF MAGNESIUM: CPT

## 2022-07-24 PROCEDURE — 87040 BLOOD CULTURE FOR BACTERIA: CPT

## 2022-07-24 PROCEDURE — 85025 COMPLETE CBC W/AUTO DIFF WBC: CPT

## 2022-07-24 RX ADMIN — SODIUM CHLORIDE, PRESERVATIVE FREE 10 ML: 5 INJECTION INTRAVENOUS at 21:39

## 2022-07-24 RX ADMIN — SODIUM CHLORIDE, PRESERVATIVE FREE 40 MG: 5 INJECTION INTRAVENOUS at 21:39

## 2022-07-24 RX ADMIN — CEFEPIME 2 G: 2 INJECTION, POWDER, FOR SOLUTION INTRAVENOUS at 01:18

## 2022-07-24 RX ADMIN — SODIUM CHLORIDE, PRESERVATIVE FREE 10 ML: 5 INJECTION INTRAVENOUS at 06:00

## 2022-07-24 RX ADMIN — VANCOMYCIN HYDROCHLORIDE 1000 MG: 1 INJECTION, POWDER, LYOPHILIZED, FOR SOLUTION INTRAVENOUS at 08:18

## 2022-07-24 RX ADMIN — SODIUM CHLORIDE, PRESERVATIVE FREE 10 ML: 5 INJECTION INTRAVENOUS at 14:00

## 2022-07-24 RX ADMIN — SODIUM CHLORIDE, PRESERVATIVE FREE 40 MG: 5 INJECTION INTRAVENOUS at 09:15

## 2022-07-24 NOTE — PROGRESS NOTES
Critical Care Progress Note  Eli Abad MD          Date of Service:  2022  NAME:  Madeleine Barkley  :  1950  MRN:  [id number]      Subjective/Hospital course:      : Came off pressors. Plan for EGD tomorrow. No evidence of brisk bleeding. Problem list:      -Hemorrhagic shock. - Acute blood loss anemia.  - GI bleed. Likely upper GI bleed. --GPC bacteremia. - Acute renal failure. - History of stroke. - History of hypertension.  - Hyperlipidemia. Assessment/Plan:      Hemorrhagic shock.  - Likely secondary to GI bleed. - Titrate vasopressors for goal map of more than 65, now weaned off. - Blood transfusion for goal hemoglobin more than 7.  - Consulted GI, plan for EGD tomorrow. - Closely monitor hemodynamics and markers of endorgan perfusion including mental status, urine output and lactic acid.  -On empiric vanc and cefepime for possible sepsis, will likely appear off tomorrow if clinically improves. Acute blood loss anemia:  -Transfuse PRBC for Hb <7.  -Monitor H/H Q8hrs. GPC bacteremia:  -continue vanc, DC cefepime.  -Echo to look for endocarditis. -FU Cx for S/S  -Repeat blood cultures. GIB:  -Likely UGIB. -Consulted GI.  -Monitor H/H  -Continue protonix.  -Holding ASA and plavix for now.  -NPO. Acute renal failure.  -Likely pre renal/ATN etilogy.  -Closely monitor renal functions and UO. -Avoid nephrotoxins.  -Maintain MAP >65 and avoid rapid fluctuations in BP. H/o HTN. -Hold BP meds. DVT prophylaxis. SCD. CODE STATUS. Full code. Plan was discussed in detail with patient and nursing staff. Transfer out of ICU today. Review of Systems:   Review of Systems   All other systems reviewed and are negative.        Vital Signs:   Patient Vitals for the past 4 hrs:   BP Temp Pulse Resp SpO2   22 0900 121/70 -- 89 12 95 %   22 0800 123/78 98.2 °F (36.8 °C) 80 14 95 %   22 0700 129/73 -- 78 15 95 % Intake/Output Summary (Last 24 hours) at 7/24/2022 1041  Last data filed at 7/24/2022 0900  Gross per 24 hour   Intake 1022.93 ml   Output 1730 ml   Net -707.07 ml        Physical Examination:    Physical Exam  Constitutional:       Appearance: Normal appearance. HENT:      Head: Normocephalic and atraumatic. Mouth/Throat:      Mouth: Mucous membranes are moist.   Eyes:      Extraocular Movements: Extraocular movements intact. Conjunctiva/sclera: Conjunctivae normal.   Cardiovascular:      Rate and Rhythm: Normal rate and regular rhythm. Pulmonary:      Effort: Pulmonary effort is normal. No respiratory distress. Breath sounds: Normal breath sounds. Abdominal:      General: Abdomen is flat. There is no distension. Palpations: Abdomen is soft. Tenderness: There is no abdominal tenderness. There is no guarding. Musculoskeletal:      Cervical back: Normal range of motion and neck supple. Neurological:      Mental Status: He is alert and oriented to person, place, and time. Labs and Imaging:   Reviewed.       Medications:     Current Facility-Administered Medications   Medication Dose Route Frequency    NOREPINephrine (LEVOPHED) 8 mg in 5% dextrose 250mL (32 mcg/mL) infusion  0.5-16 mcg/min IntraVENous TITRATE    sodium chloride (NS) flush 5-40 mL  5-40 mL IntraVENous Q8H    sodium chloride (NS) flush 5-40 mL  5-40 mL IntraVENous PRN    acetaminophen (TYLENOL) tablet 650 mg  650 mg Oral Q6H PRN    Or    acetaminophen (TYLENOL) suppository 650 mg  650 mg Rectal Q6H PRN    polyethylene glycol (MIRALAX) packet 17 g  17 g Oral DAILY PRN    pantoprazole (PROTONIX) 40 mg in 0.9% sodium chloride 10 mL injection  40 mg IntraVENous Q12H    0.9% sodium chloride infusion 250 mL  250 mL IntraVENous PRN    vancomycin (VANCOCIN) 1,000 mg in 0.9% sodium chloride 250 mL (VIAL-MATE)  1,000 mg IntraVENous Q18H    [START ON 7/25/2022] VANCOMYCIN INFORMATION NOTE   Other ONCE    cefepime (MAXIPIME) 2 g in 0.9% sodium chloride (MBP/ADV) 100 mL MBP  2 g IntraVENous Q12H     ______________________________________________________________________  EXPECTED LENGTH OF STAY: - - -  ACTUAL LENGTH OF STAY:          [address]odi Wilson, MD   Pulmonary/CCM  [address]  [phone]  7/24/2022

## 2022-07-24 NOTE — PROGRESS NOTES
GI Progress Note Sulaiman Doan)  NAME:Mick Jeffries :1950 ZZP:[id number]   PCP: Kat Vásquez MD  Date/Time:  2022 11:01 AM   Assessment:     H/o CVA on Plavix/ASA, last dose   Acute anemia  Dark stools/+FOBT; no e/o active or recent bleeding currently  On low dose vasopressor support previously, currently off     Plan:     - ok for CLD  - BID PPI  - EGD, tomorrow NPO after midnight for this    Dr. Casie Mcintosh will assume GI care tomorrow     Subjective:   No BM's or e/o bleeding    Complaint Y/N Description   Abdominal Pain     Hematemesis     Hematochezia     Melena     Constipation     Diarrhea     Dyspepsia     Dysphagia     Jaundiced     Nausea/vomiting       Review of Systems:  Symptom Y/N Comments  Symptom Y/N Comments   Fever/Chills    Chest Pain     Cough    Headaches     Sputum    Joint Pain     SOB/URBAN    Pruritis/Rash     Tolerating Diet    Other       Could NOT obtain due to:      Objective:   VITALS:   Last 24hrs VS reviewed since prior progress note. Most recent are:  Visit Vitals  /70 (BP Patient Position: Sitting)   Pulse 89   Temp 98.2 °F (36.8 °C)   Resp 12   Ht 6' 2\" (1.88 m)   Wt 66.2 kg (145 lb 15.1 oz)   SpO2 95%   BMI 18.74 kg/m²       Intake/Output Summary (Last 24 hours) at 2022 1101  Last data filed at 2022 0900  Gross per 24 hour   Intake 1022.93 ml   Output 1510 ml   Net -487.07 ml     PHYSICAL EXAM:  General: WD, WN. Alert, cooperative, no acute distress    HEENT: NC, Atraumatic. PERRLA, EOMI. Anicteric sclerae. Lungs:  CTA Bilaterally. No Wheezing/Rhonchi/Rales. Heart:  Regular  rhythm,  No murmur (), No Rubs, No Gallops  Abdomen: Soft, Non distended, Non tender.   +Bowel sounds, no HSM  Extremities: No c/c/e  Neurologic:  No acute neurological distress       Lab and Radiology Data Reviewed: (see below)    Medications Reviewed: (see below)  PMH/SH reviewed - no change compared to H&P  ________________________________________________________________________    Care Plan discussed with:  Patient x   Family     RN x              Consultant:       Analisa Long MD     Procedures: see electronic medical records for all procedures/Xrays and details which were not copied into this note but were reviewed prior to creation of Plan. LABS:  Recent Labs     07/24/22  0354 07/23/22  2356 07/23/22  1650 07/23/22  1010   WBC 11.7*  --   --  12.0*   HGB 7.9* 7.8*   < > 6.8*   HCT 24.3*  --   --  20.7*     --   --  247    < > = values in this interval not displayed. Recent Labs     07/24/22  0354 07/23/22  1010 07/23/22  0306    141 140   K 3.5 3.7 4.0   * 112* 110*   CO2 23 23 21   BUN 30* 63* 84*   CREA 0.83 1.19 1.49*   GLU 93 96 113*   CA 8.6 8.7 8.5   MG 1.9 1.9 1.8   PHOS 2.2* 3.1 3.0     Recent Labs     07/22/22  2202   AP 46   TP 6.4   ALB 3.3*   GLOB 3.1     No results for input(s): INR, PTP, APTT, INREXT in the last 72 hours. Recent Labs     07/22/22  2326   TIBC 322   PSAT 17*      Lab Results   Component Value Date/Time    Folate 35.9 (H) 07/22/2022 11:26 PM     No results for input(s): PH, PCO2, PO2 in the last 72 hours. No results for input(s): CPK, CKMB in the last 72 hours.     No lab exists for component: TROPONINI  Lab Results   Component Value Date/Time    Color YELLOW/STRAW 07/23/2022 12:10 AM    Appearance CLEAR 07/23/2022 12:10 AM    Specific gravity 1.018 07/23/2022 12:10 AM    pH (UA) 5.0 07/23/2022 12:10 AM    Protein TRACE (A) 07/23/2022 12:10 AM    Glucose Negative 07/23/2022 12:10 AM    Ketone Negative 07/23/2022 12:10 AM    Bilirubin Negative 07/23/2022 12:10 AM    Urobilinogen 0.2 07/23/2022 12:10 AM    Nitrites Negative 07/23/2022 12:10 AM    Leukocyte Esterase TRACE (A) 07/23/2022 12:10 AM    Epithelial cells FEW 07/23/2022 12:10 AM    Bacteria 2+ (A) 07/23/2022 12:10 AM    WBC 5-10 07/23/2022 12:10 AM    RBC 5-10 07/23/2022 12:10 AM MEDICATIONS:  Current Facility-Administered Medications   Medication Dose Route Frequency    NOREPINephrine (LEVOPHED) 8 mg in 5% dextrose 250mL (32 mcg/mL) infusion  0.5-16 mcg/min IntraVENous TITRATE    sodium chloride (NS) flush 5-40 mL  5-40 mL IntraVENous Q8H    sodium chloride (NS) flush 5-40 mL  5-40 mL IntraVENous PRN    acetaminophen (TYLENOL) tablet 650 mg  650 mg Oral Q6H PRN    Or    acetaminophen (TYLENOL) suppository 650 mg  650 mg Rectal Q6H PRN    polyethylene glycol (MIRALAX) packet 17 g  17 g Oral DAILY PRN    pantoprazole (PROTONIX) 40 mg in 0.9% sodium chloride 10 mL injection  40 mg IntraVENous Q12H    0.9% sodium chloride infusion 250 mL  250 mL IntraVENous PRN    vancomycin (VANCOCIN) 1,000 mg in 0.9% sodium chloride 250 mL (VIAL-MATE)  1,000 mg IntraVENous Q18H    [START ON 7/25/2022] VANCOMYCIN INFORMATION NOTE   Other ONCE

## 2022-07-24 NOTE — PROGRESS NOTES
0800 - Bedside report received from [facility]. Patient A & O x 4, follows commands, patient up with nursing to chair. Tolerated fairly well. Assessment documented see flow sheets. 1200 - Patient bathed with partial assistance. Repeat CBC - hgb 8.9. Reassessment documented see flow sheets. 1300 - Tech at bedside for echo.    1600 - Reassessment documented. No changes. See flow sheets. 1700 - Repeat hgb 8.1. End of Shift Note    Bedside shift change report given to Dante Barrientos RN (oncoming nurse) by Jesica Glass RN (offgoing nurse). Report included the following information SBAR, Kardex, Intake/Output, MAR, Recent Results, and Med Rec Status    Shift worked:  7a-7p     Shift summary and any significant changes:     none     Concerns for physician to address:  none     Zone phone for oncoming shift:   N/A       Activity:  Activity Level: Up with Assistance  Number times ambulated in hallways past shift: 0  Number of times OOB to chair past shift: 1    Cardiac:   Cardiac Monitoring: Yes      Cardiac Rhythm: Sinus Rhythm    Access:   Current line(s): PIV     Genitourinary:   Urinary status: voiding    Respiratory:   O2 Device: None (Room air)  Chronic home O2 use?: NO  Incentive spirometer at bedside: YES       GI:  Last Bowel Movement Date:  (PTA)  Current diet:  ADULT ORAL NUTRITION SUPPLEMENT Breakfast, Lunch, Dinner; Clear Liquid  DIET NPO  ADULT DIET Clear Liquid  Passing flatus: YES  Tolerating current diet: YES       Pain Management:   Patient states pain is manageable on current regimen: YES    Skin:  Joseph Score: 19  Interventions: float heels and increase time out of bed    Patient Safety:  Fall Score:  Total Score: 2  Interventions: bed/chair alarm and gripper socks       Length of Stay:  Expected LOS: - - -  Actual LOS: CANDI Vogel

## 2022-07-25 ENCOUNTER — ANESTHESIA (OUTPATIENT)
Dept: ENDOSCOPY | Age: 72
DRG: 377 | End: 2022-07-25
Payer: MEDICARE

## 2022-07-25 ENCOUNTER — ANESTHESIA EVENT (OUTPATIENT)
Dept: ENDOSCOPY | Age: 72
DRG: 377 | End: 2022-07-25
Payer: MEDICARE

## 2022-07-25 LAB
ANION GAP SERPL CALC-SCNC: 6 MMOL/L (ref 5–15)
BASOPHILS # BLD: 0.1 K/UL (ref 0–0.1)
BASOPHILS NFR BLD: 1 % (ref 0–1)
BUN SERPL-MCNC: 16 MG/DL (ref 6–20)
BUN/CREAT SERPL: 25 (ref 12–20)
CALCIUM SERPL-MCNC: 8.4 MG/DL (ref 8.5–10.1)
CHLORIDE SERPL-SCNC: 110 MMOL/L (ref 97–108)
CO2 SERPL-SCNC: 25 MMOL/L (ref 21–32)
CREAT SERPL-MCNC: 0.64 MG/DL (ref 0.7–1.3)
DATE LAST DOSE: ABNORMAL
DIFFERENTIAL METHOD BLD: ABNORMAL
ECHO AO ASC DIAM: 3.3 CM
ECHO AO ASCENDING AORTA INDEX: 1.74 CM/M2
ECHO AV AREA PEAK VELOCITY: 2.3 CM2
ECHO AV AREA VTI: 2.4 CM2
ECHO AV AREA/BSA PEAK VELOCITY: 1.2 CM2/M2
ECHO AV AREA/BSA VTI: 1.3 CM2/M2
ECHO AV MEAN GRADIENT: 11 MMHG
ECHO AV MEAN VELOCITY: 1.5 M/S
ECHO AV PEAK GRADIENT: 21 MMHG
ECHO AV PEAK VELOCITY: 2.3 M/S
ECHO AV VELOCITY RATIO: 0.43
ECHO AV VTI: 42.6 CM
ECHO LA DIAMETER INDEX: 1 CM/M2
ECHO LA DIAMETER: 1.9 CM
ECHO LV FRACTIONAL SHORTENING: 23 % (ref 28–44)
ECHO LV INTERNAL DIMENSION DIASTOLE INDEX: 2.11 CM/M2
ECHO LV INTERNAL DIMENSION DIASTOLIC: 4 CM (ref 4.2–5.9)
ECHO LV INTERNAL DIMENSION SYSTOLIC INDEX: 1.63 CM/M2
ECHO LV INTERNAL DIMENSION SYSTOLIC: 3.1 CM
ECHO LV IVSD: 1.1 CM (ref 0.6–1)
ECHO LV MASS 2D: 145.6 G (ref 88–224)
ECHO LV MASS INDEX 2D: 76.7 G/M2 (ref 49–115)
ECHO LV POSTERIOR WALL DIASTOLIC: 1.1 CM (ref 0.6–1)
ECHO LV RELATIVE WALL THICKNESS RATIO: 0.55
ECHO LVOT AREA: 5.3 CM2
ECHO LVOT AV VTI INDEX: 0.47
ECHO LVOT DIAM: 2.6 CM
ECHO LVOT MEAN GRADIENT: 2 MMHG
ECHO LVOT PEAK GRADIENT: 4 MMHG
ECHO LVOT PEAK VELOCITY: 1 M/S
ECHO LVOT STROKE VOLUME INDEX: 56.1 ML/M2
ECHO LVOT SV: 106.7 ML
ECHO LVOT VTI: 20.1 CM
ECHO MV A VELOCITY: 0.8 M/S
ECHO MV AREA VTI: 5.2 CM2
ECHO MV E DECELERATION TIME (DT): 179.6 MS
ECHO MV E VELOCITY: 0.82 M/S
ECHO MV E/A RATIO: 1.03
ECHO MV LVOT VTI INDEX: 1.03
ECHO MV MAX VELOCITY: 0.8 M/S
ECHO MV MEAN GRADIENT: 1 MMHG
ECHO MV MEAN VELOCITY: 0.6 M/S
ECHO MV PEAK GRADIENT: 2 MMHG
ECHO MV REGURGITANT PEAK GRADIENT: 0 MMHG
ECHO MV REGURGITANT PEAK VELOCITY: 0.1 M/S
ECHO MV VTI: 20.7 CM
ECHO RV TAPSE: 1.9 CM (ref 1.7–?)
ECHO TV REGURGITANT MAX VELOCITY: 2.6 M/S
ECHO TV REGURGITANT PEAK GRADIENT: 28 MMHG
EOSINOPHIL # BLD: 0.2 K/UL (ref 0–0.4)
EOSINOPHIL NFR BLD: 2 % (ref 0–7)
ERYTHROCYTE [DISTWIDTH] IN BLOOD BY AUTOMATED COUNT: 15.9 % (ref 11.5–14.5)
GLUCOSE SERPL-MCNC: 93 MG/DL (ref 65–100)
HCT VFR BLD AUTO: 24.2 % (ref 36.6–50.3)
HGB BLD-MCNC: 8.2 G/DL (ref 12.1–17)
IMM GRANULOCYTES # BLD AUTO: 0.1 K/UL (ref 0–0.04)
IMM GRANULOCYTES NFR BLD AUTO: 1 % (ref 0–0.5)
LYMPHOCYTES # BLD: 1.6 K/UL (ref 0.8–3.5)
LYMPHOCYTES NFR BLD: 17 % (ref 12–49)
MAGNESIUM SERPL-MCNC: 1.9 MG/DL (ref 1.6–2.4)
MCH RBC QN AUTO: 29.9 PG (ref 26–34)
MCHC RBC AUTO-ENTMCNC: 33.9 G/DL (ref 30–36.5)
MCV RBC AUTO: 88.3 FL (ref 80–99)
MONOCYTES # BLD: 1 K/UL (ref 0–1)
MONOCYTES NFR BLD: 11 % (ref 5–13)
NEUTS SEG # BLD: 6.5 K/UL (ref 1.8–8)
NEUTS SEG NFR BLD: 68 % (ref 32–75)
NRBC # BLD: 0 K/UL (ref 0–0.01)
NRBC BLD-RTO: 0 PER 100 WBC
PHOSPHATE SERPL-MCNC: 2.3 MG/DL (ref 2.6–4.7)
PLATELET # BLD AUTO: 251 K/UL (ref 150–400)
PMV BLD AUTO: 9.5 FL (ref 8.9–12.9)
POTASSIUM SERPL-SCNC: 3.6 MMOL/L (ref 3.5–5.1)
RBC # BLD AUTO: 2.74 M/UL (ref 4.1–5.7)
REPORTED DOSE,DOSE: ABNORMAL UNITS
REPORTED DOSE/TIME,TMG: ABNORMAL
SODIUM SERPL-SCNC: 141 MMOL/L (ref 136–145)
VANCOMYCIN TROUGH SERPL-MCNC: 11.5 UG/ML (ref 5–10)
WBC # BLD AUTO: 9.4 K/UL (ref 4.1–11.1)

## 2022-07-25 PROCEDURE — 0DB68ZX EXCISION OF STOMACH, VIA NATURAL OR ARTIFICIAL OPENING ENDOSCOPIC, DIAGNOSTIC: ICD-10-PCS | Performed by: INTERNAL MEDICINE

## 2022-07-25 PROCEDURE — 93306 TTE W/DOPPLER COMPLETE: CPT | Performed by: INTERNAL MEDICINE

## 2022-07-25 PROCEDURE — 77030019988 HC FCPS ENDOSC DISP BSC -B: Performed by: INTERNAL MEDICINE

## 2022-07-25 PROCEDURE — 80202 ASSAY OF VANCOMYCIN: CPT

## 2022-07-25 PROCEDURE — 74011250636 HC RX REV CODE- 250/636: Performed by: HOSPITALIST

## 2022-07-25 PROCEDURE — 83735 ASSAY OF MAGNESIUM: CPT

## 2022-07-25 PROCEDURE — 76060000031 HC ANESTHESIA FIRST 0.5 HR: Performed by: INTERNAL MEDICINE

## 2022-07-25 PROCEDURE — 88305 TISSUE EXAM BY PATHOLOGIST: CPT

## 2022-07-25 PROCEDURE — 80048 BASIC METABOLIC PNL TOTAL CA: CPT

## 2022-07-25 PROCEDURE — 74011000250 HC RX REV CODE- 250: Performed by: NURSE PRACTITIONER

## 2022-07-25 PROCEDURE — 74011250636 HC RX REV CODE- 250/636: Performed by: INTERNAL MEDICINE

## 2022-07-25 PROCEDURE — 74011000250 HC RX REV CODE- 250: Performed by: NURSE ANESTHETIST, CERTIFIED REGISTERED

## 2022-07-25 PROCEDURE — 74011000250 HC RX REV CODE- 250: Performed by: INTERNAL MEDICINE

## 2022-07-25 PROCEDURE — C9113 INJ PANTOPRAZOLE SODIUM, VIA: HCPCS | Performed by: NURSE PRACTITIONER

## 2022-07-25 PROCEDURE — 74011250636 HC RX REV CODE- 250/636: Performed by: NURSE PRACTITIONER

## 2022-07-25 PROCEDURE — 2709999900 HC NON-CHARGEABLE SUPPLY: Performed by: INTERNAL MEDICINE

## 2022-07-25 PROCEDURE — 84100 ASSAY OF PHOSPHORUS: CPT

## 2022-07-25 PROCEDURE — 74011250636 HC RX REV CODE- 250/636: Performed by: NURSE ANESTHETIST, CERTIFIED REGISTERED

## 2022-07-25 PROCEDURE — 85025 COMPLETE CBC W/AUTO DIFF WBC: CPT

## 2022-07-25 PROCEDURE — 76040000019: Performed by: INTERNAL MEDICINE

## 2022-07-25 PROCEDURE — 65270000046 HC RM TELEMETRY

## 2022-07-25 RX ORDER — PROPOFOL 10 MG/ML
INJECTION, EMULSION INTRAVENOUS AS NEEDED
Status: DISCONTINUED | OUTPATIENT
Start: 2022-07-25 | End: 2022-07-25 | Stop reason: HOSPADM

## 2022-07-25 RX ORDER — LIDOCAINE HYDROCHLORIDE 20 MG/ML
INJECTION, SOLUTION EPIDURAL; INFILTRATION; INTRACAUDAL; PERINEURAL AS NEEDED
Status: DISCONTINUED | OUTPATIENT
Start: 2022-07-25 | End: 2022-07-25 | Stop reason: HOSPADM

## 2022-07-25 RX ORDER — PHENYLEPHRINE HCL IN 0.9% NACL 0.4MG/10ML
SYRINGE (ML) INTRAVENOUS AS NEEDED
Status: DISCONTINUED | OUTPATIENT
Start: 2022-07-25 | End: 2022-07-25 | Stop reason: HOSPADM

## 2022-07-25 RX ORDER — SODIUM CHLORIDE 9 MG/ML
25 INJECTION, SOLUTION INTRAVENOUS CONTINUOUS
Status: DISPENSED | OUTPATIENT
Start: 2022-07-25 | End: 2022-07-25

## 2022-07-25 RX ORDER — FLUMAZENIL 0.1 MG/ML
0.2 INJECTION INTRAVENOUS
Status: DISCONTINUED | OUTPATIENT
Start: 2022-07-25 | End: 2022-07-25 | Stop reason: HOSPADM

## 2022-07-25 RX ORDER — SODIUM CHLORIDE 0.9 % (FLUSH) 0.9 %
5-40 SYRINGE (ML) INJECTION EVERY 8 HOURS
Status: DISCONTINUED | OUTPATIENT
Start: 2022-07-25 | End: 2022-07-30 | Stop reason: HOSPADM

## 2022-07-25 RX ORDER — EPINEPHRINE 0.1 MG/ML
1 INJECTION INTRACARDIAC; INTRAVENOUS
Status: DISCONTINUED | OUTPATIENT
Start: 2022-07-25 | End: 2022-07-25 | Stop reason: HOSPADM

## 2022-07-25 RX ORDER — SODIUM CHLORIDE 0.9 % (FLUSH) 0.9 %
5-40 SYRINGE (ML) INJECTION AS NEEDED
Status: DISCONTINUED | OUTPATIENT
Start: 2022-07-25 | End: 2022-07-30 | Stop reason: HOSPADM

## 2022-07-25 RX ORDER — DEXTROMETHORPHAN/PSEUDOEPHED 2.5-7.5/.8
1.2 DROPS ORAL
Status: DISCONTINUED | OUTPATIENT
Start: 2022-07-25 | End: 2022-07-25 | Stop reason: HOSPADM

## 2022-07-25 RX ORDER — ATROPINE SULFATE 0.1 MG/ML
0.5 INJECTION INTRAVENOUS
Status: DISCONTINUED | OUTPATIENT
Start: 2022-07-25 | End: 2022-07-25 | Stop reason: HOSPADM

## 2022-07-25 RX ORDER — NALOXONE HYDROCHLORIDE 0.4 MG/ML
0.4 INJECTION, SOLUTION INTRAMUSCULAR; INTRAVENOUS; SUBCUTANEOUS
Status: DISCONTINUED | OUTPATIENT
Start: 2022-07-25 | End: 2022-07-25 | Stop reason: HOSPADM

## 2022-07-25 RX ADMIN — SODIUM CHLORIDE, PRESERVATIVE FREE 10 ML: 5 INJECTION INTRAVENOUS at 21:32

## 2022-07-25 RX ADMIN — SODIUM CHLORIDE, PRESERVATIVE FREE 40 MG: 5 INJECTION INTRAVENOUS at 09:56

## 2022-07-25 RX ADMIN — Medication 40 MCG: at 15:06

## 2022-07-25 RX ADMIN — LIDOCAINE HYDROCHLORIDE 40 MG: 20 INJECTION, SOLUTION EPIDURAL; INFILTRATION; INTRACAUDAL; PERINEURAL at 14:59

## 2022-07-25 RX ADMIN — SODIUM CHLORIDE, PRESERVATIVE FREE 10 ML: 5 INJECTION INTRAVENOUS at 04:33

## 2022-07-25 RX ADMIN — PROPOFOL 70 MG: 10 INJECTION, EMULSION INTRAVENOUS at 15:00

## 2022-07-25 RX ADMIN — PROPOFOL 20 MG: 10 INJECTION, EMULSION INTRAVENOUS at 15:07

## 2022-07-25 RX ADMIN — SODIUM CHLORIDE 25 ML/HR: 9 INJECTION, SOLUTION INTRAVENOUS at 14:21

## 2022-07-25 RX ADMIN — Medication 40 MCG: at 15:10

## 2022-07-25 RX ADMIN — VANCOMYCIN HYDROCHLORIDE 1000 MG: 1 INJECTION, POWDER, LYOPHILIZED, FOR SOLUTION INTRAVENOUS at 02:06

## 2022-07-25 RX ADMIN — SODIUM CHLORIDE, PRESERVATIVE FREE 10 ML: 5 INJECTION INTRAVENOUS at 16:09

## 2022-07-25 RX ADMIN — VANCOMYCIN HYDROCHLORIDE 1000 MG: 1 INJECTION, POWDER, LYOPHILIZED, FOR SOLUTION INTRAVENOUS at 19:33

## 2022-07-25 RX ADMIN — PROPOFOL 30 MG: 10 INJECTION, EMULSION INTRAVENOUS at 15:04

## 2022-07-25 RX ADMIN — SODIUM CHLORIDE, PRESERVATIVE FREE 40 MG: 5 INJECTION INTRAVENOUS at 21:32

## 2022-07-25 NOTE — PROGRESS NOTES
1900: [facility] Bedside reporting performed. 1930: Pt assessed, no acute issues, pt is pleasant and alert. Good appetite, no abdominal pain. Brothers number placed in chart, wife is staying with him overnight so call that number if she needs to be contacted. 2200: Bed arranged on PCU 2273. Will give report when nurse is available. 2234: Report given to Jessica Rubalcava RN.    0000: Called wife and updated on room number. Problem: Pressure Injury - Risk of  Goal: *Prevention of pressure injury  Description: Document Joseph Scale and appropriate interventions in the flowsheet. Outcome: Progressing Towards Goal  Note: Pressure Injury Interventions: Activity Interventions: Assess need for specialty bed, Increase time out of bed    Mobility Interventions: Assess need for specialty bed    Nutrition Interventions: Document food/fluid/supplement intake    Friction and Shear Interventions: Apply protective barrier, creams and emollients                Problem: Patient Education: Go to Patient Education Activity  Goal: Patient/Family Education  Outcome: Progressing Towards Goal     Problem: Falls - Risk of  Goal: *Absence of Falls  Description: Document Rushie Duff Fall Risk and appropriate interventions in the flowsheet.   Outcome: Progressing Towards Goal  Note: Fall Risk Interventions:  Mobility Interventions: Bed/chair exit alarm         Medication Interventions: Assess postural VS orthostatic hypotension    Elimination Interventions: Call light in reach, Bed/chair exit alarm              Problem: Patient Education: Go to Patient Education Activity  Goal: Patient/Family Education  Outcome: Progressing Towards Goal

## 2022-07-25 NOTE — PROGRESS NOTES
Hospitalist Progress Note    NAME: Mau Sims   :  1950   MRN:  [id number]       Assessment / Plan:  Hemorrhagic shock POA  Acute blood loss anemia admit Hgb 7.3 --> 5.8  Probable upper GI bleed POA  Presented with weakness, black stool  Admitted to ICU  HgG dropped from 7.3 --> 5.8, last HgB at [facility] was 14.3 in 2022  Transient pressors, IVF  S/pa 2 units pRBCs, HgB now 8.0 range  CTA abdomen/pelvis in ED         No identified active extravasation        Ectopic right pelvic kidney with nonobstructing stones seen within both kidneys. IV PPI  Plan for egd today   Hold ASA and plavix  Gi consulted      GPC bacteremia POA,   Blood culture came back positive showed a Streptococcus mitis/oralis   IV vancomycin  CXR no ASD or edema  UA  CTA abdomen/pelvis no with acute abnormalities  Await speciation  Follow up repeat BC  Echo done, results pending  Will consult ID    Acute kidney injury POA Admit BUN/creat 96 and 1.97  Last baseline creat 0.87 in 2022  Suspect hypovolemia with the bleeding and shock  IVF, creatinine improved to 0.83  Holding losartan        Prior stroke POA. Essential hypertension POA  Hyperlipidemia POA  Holding ASA, plavix, statin  Resume ASA and statin post EGD  Holding losartan with bleed, hypotension, STEFAN              Resume as needed     Body mass index is 18.74 kg/m². Code status: Full  Prophylaxis: SCD's  Recommended Disposition: Home w/Family      18.5 - 24.9 Normal weight / Body mass index is 18.74 kg/m². Estimated discharge date:   Barriers:    Code status: Full  Prophylaxis: SCD's  Recommended Disposition:  PT, OT, RN     Subjective:     Chief Complaint / Reason for Physician Visit  Follow-up GI bleed, Streptococcus mitis bacteremia,discussed with RN events overnight.      Review of Systems:  Symptom Y/N Comments  Symptom Y/N Comments   Fever/Chills n   Chest Pain n    Poor Appetite    Edema     Cough    Abdominal Pain n    Sputum    Joint Pain SOB/URBAN n   Pruritis/Rash     Nausea/vomit n   Tolerating PT/OT     Diarrhea    Tolerating Diet y    Constipation n   Other       Could NOT obtain due to:      Objective:     VITALS:   Last 24hrs VS reviewed since prior progress note. Most recent are:  Patient Vitals for the past 24 hrs:   Temp Pulse Resp BP SpO2   07/25/22 1603 97.5 °F (36.4 °C) 73 14 (!) 118/50 100 %   07/25/22 1541 -- 67 15 118/65 99 %   07/25/22 1533 -- 67 15 (!) 133/55 98 %   07/25/22 1530 -- 69 15 122/67 97 %   07/25/22 1527 98.7 °F (37.1 °C) 71 17 109/60 97 %   07/25/22 1516 98.2 °F (36.8 °C) 78 12 (!) 99/57 97 %   07/25/22 1414 98 °F (36.7 °C) 90 16 (!) 154/77 99 %   07/25/22 1131 98.3 °F (36.8 °C) 74 15 119/60 98 %   07/25/22 1126 -- 74 11 119/60 98 %   07/25/22 0732 98.1 °F (36.7 °C) 70 13 (!) 107/59 99 %   07/25/22 0430 98 °F (36.7 °C) 71 15 (!) 111/59 97 %   07/25/22 0400 -- 72 -- -- --   07/25/22 0000 -- 78 -- -- --   07/24/22 2300 98.2 °F (36.8 °C) 74 17 134/76 97 %   07/24/22 1930 98.3 °F (36.8 °C) 75 18 123/65 97 %       Intake/Output Summary (Last 24 hours) at 7/25/2022 1721  Last data filed at 7/25/2022 1519  Gross per 24 hour   Intake 150 ml   Output 650 ml   Net -500 ml        I had a face to face encounter and independently examined this patient on 7/25/2022, as outlined below:  PHYSICAL EXAM:  General: WD, WN. Alert, cooperative, no acute distress    EENT:  EOMI. Anicteric sclerae. MMM  Resp:  CTA bilaterally, no wheezing or rales. No accessory muscle use  CV:  Regular  rhythm,  No edema  GI:  Soft, Non distended, Non tender. +Bowel sounds  Neurologic:  Alert and oriented X 3, normal speech,   Psych:   Good insight. Not anxious nor agitated  Skin:  No rashes.   No jaundice    Reviewed most current lab test results and cultures  YES  Reviewed most current radiology test results   YES  Review and summation of old records today    NO  Reviewed patient's current orders and MAR    YES  PMH/SH reviewed - no change compared to H&P  ________________________________________________________________________  Care Plan discussed with:    Comments   Patient x    Family      RN x    Care Manager     Consultant                        Multidiciplinary team rounds were held today with , nursing, pharmacist and clinical coordinator. Patient's plan of care was discussed; medications were reviewed and discharge planning was addressed. ________________________________________________________________________  Total NON critical care TIME:  35   Minutes    Total CRITICAL CARE TIME Spent:   Minutes non procedure based      Comments   >50% of visit spent in counseling and coordination of care     ________________________________________________________________________  Mario León MD     Procedures: see electronic medical records for all procedures/Xrays and details which were not copied into this note but were reviewed prior to creation of Plan. LABS:  I reviewed today's most current labs and imaging studies. Pertinent labs include:  Recent Labs     07/25/22  0114 07/24/22  1703 07/24/22  1036 07/24/22  0354 07/23/22  1650 07/23/22  1010   WBC 9.4  --   --  11.7*  --  12.0*   HGB 8.2* 8.1* 8.9* 7.9*   < > 6.8*   HCT 24.2*  --   --  24.3*  --  20.7*     --   --  226  --  247    < > = values in this interval not displayed. Recent Labs     07/25/22  0114 07/24/22  0354 07/23/22  1010 07/23/22  0306 07/22/22  2202    142 141   < > 138   K 3.6 3.5 3.7   < > 4.2   * 112* 112*   < > 108   CO2 25 23 23   < > 21   GLU 93 93 96   < > 135*   BUN 16 30* 63*   < > 96*   CREA 0.64* 0.83 1.19   < > 1.97*   CA 8.4* 8.6 8.7   < > 9.6   MG 1.9 1.9 1.9   < >  --    PHOS 2.3* 2.2* 3.1   < >  --    ALB  --   --   --   --  3.3*   TBILI  --   --   --   --  0.6   ALT  --   --   --   --  18    < > = values in this interval not displayed.        Signed: Mario León MD

## 2022-07-25 NOTE — PROGRESS NOTES
TRANSFER - IN REPORT:    Verbal report received from Fabian Mcgee on [place]  being received from [phone] for ordered procedure      Report consisted of patients Situation, Background, Assessment and   Recommendations(SBAR). Information from the following report(s) SBAR was reviewed with the receiving nurse. Opportunity for questions and clarification was provided.

## 2022-07-25 NOTE — PROCEDURES
NAME:  Migel Masterson   :   1950   MRN:   [id number]     Date/Time:  2022 2:46 PM    Esophagogastroduodenoscopy (EGD) Procedure Note    Procedure: Esophagogastroduodenoscopy with biopsy    Indication: Melena, anemia  Pre-operative Diagnosis: see indication above  Post-operative Diagnosis: see findings below  :  Milagros Childress MD  Referring Provider:   Dulce Rhoades MD    Exam:  Airway: clear, no airway problems anticipated  Heart: RRR, without gallops or rubs  Lungs: clear bilaterally without wheezes, crackles, or rhonchi  Abdomen: soft, nontender, nondistended, bowel sounds present  Mental Status: awake, alert and oriented to person, place and time     Anethesia/Sedation:  MAC anesthesia Propofol  Procedure Details   After informed consent was obtained for the procedure, with all risks and benefits of procedure explained the patient was taken to the endoscopy suite and placed in the left lateral decubitus position. Following sequential administration of sedation as per above, the [id number] gastroscope was inserted into the mouth and advanced under direct vision to second portion of the duodenum. A careful inspection was made as the gastroscope was withdrawn, including a retroflexed view of the proximal stomach; findings and interventions are described below. Findings:   OROPHARYNX: Cords and pyriform recesses normal.   ESOPHAGUS:   -- proximal esophageal web, widely patent, at level of UES  -- mid, and distal portions are normal. The Z-Line is intact. STOMACH:   -- The fundus on antegrade and retroflex views is normal. The body is normal  -- focal edema, erythema and gastritis in the antrum, biopsied. DUODENUM: The bulb, second and third portions are normal.    Therapies:   biopsy of stomach antrum    Specimens: none    EBL:  None. Complications:   None; patient tolerated the procedure well.            Impression:  -- moderate focal gastritis, possibly a healing ulcer, without active bleeding present today, biopsied  -- proximal esophageal web, widely patent  -- no fareed bleeding nor additional source of anemia/melena seen    Recommendations:  -- PPI BID x 8 weeks  -- await pathology  -- resume diet  -- serial H/H  -- GI will sign off, but please call if concerns for active GI bleeding develop    Discharge disposition:  back to wards    Mary Lou Pineda MD

## 2022-07-25 NOTE — PROGRESS NOTES
Endoscopy Case End Note:    1512:  Procedure scope was pre-cleaned, per protocol, at bedside by RAJAN COOK      7574:  Report received from anesthesia - DESIRE Ta. See anesthesia flowsheet for intra-procedure vital signs and events.     Dentures and Glasses sent to recovery with patient

## 2022-07-25 NOTE — PROGRESS NOTES
End of Shift Note    Bedside shift change report given to [address] RN (oncoming nurse) by Soy Sharma RN (offgoing nurse). Report included the following information SBAR, Kardex, and Recent Results    Shift worked:  7A-7P     Shift summary and any significant changes:     EGD completed; No further bleeding noted. H/H remains stable. Pt on clear liquid diet- tolerating well. Concerns for physician to address:  N/A     Zone phone for oncoming shift:   [phone]       Activity:  Activity Level: Up with Assistance  Number times ambulated in hallways past shift: 0  Number of times OOB to chair past shift: 0    Cardiac:   Cardiac Monitoring: Yes      Cardiac Rhythm: Sinus Rhythm    Access:   Current line(s): PIV     Genitourinary:   Urinary status: voiding    Respiratory:   O2 Device: None (Simultaneous filing. User may not have seen previous data.)  Chronic home O2 use?: NO  Incentive spirometer at bedside: NO       GI:  Last Bowel Movement Date:  (PTA)  Current diet:  ADULT ORAL NUTRITION SUPPLEMENT Breakfast, Lunch, Dinner; Clear Liquid  ADULT DIET Clear Liquid  Passing flatus: NO  Tolerating current diet: YES       Pain Management:   Patient states pain is manageable on current regimen: YES    Skin:  Joseph Score: 19  Interventions: increase time out of bed    Patient Safety:  Fall Score:  Total Score: 0  Interventions: pt to call before getting OOB       Length of Stay:  Expected LOS: 4d 9h  Actual LOS: [address] RN

## 2022-07-25 NOTE — ANESTHESIA POSTPROCEDURE EVALUATION
Procedure(s):  ESOPHAGOGASTRODUODENOSCOPY (EGD)  ESOPHAGOGASTRODUODENAL (EGD) BIOPSY. MAC    Anesthesia Post Evaluation      Multimodal analgesia: multimodal analgesia used between 6 hours prior to anesthesia start to PACU discharge  Patient location during evaluation: PACU  Patient participation: complete - patient participated  Level of consciousness: sleepy but conscious and responsive to verbal stimuli  Pain score: 1  Pain management: adequate  Airway patency: patent  Anesthetic complications: no  Cardiovascular status: acceptable  Respiratory status: acceptable  Hydration status: acceptable  Comments: +Post-Anesthesia Evaluation and Assessment    Patient: Wyatt Casanova MRN: [id number]  SSN: [id number]   YOB: 1950  Age: 67 y.o. Sex: male      Cardiovascular Function/Vital Signs    BP (!) 133/55   Pulse 67   Temp 37.1 °C (98.7 °F)   Resp 15   Ht 6' 2\" (1.88 m)   Wt 66.2 kg (145 lb 15.1 oz)   SpO2 98%   BMI 18.74 kg/m²     Patient is status post Procedure(s):  ESOPHAGOGASTRODUODENOSCOPY (EGD)  ESOPHAGOGASTRODUODENAL (EGD) BIOPSY. Nausea/Vomiting: Controlled. Postoperative hydration reviewed and adequate. Pain:  Pain Scale 1: Numeric (0 - 10) (07/25/22 1516)  Pain Intensity 1: 0 (07/25/22 1516)   Managed. Neurological Status: At baseline. Mental Status and Level of Consciousness: Arousable. Pulmonary Status:   O2 Device: None (Simultaneous filing. User may not have seen previous data.) (07/25/22 1516)   Adequate oxygenation and airway patent. Complications related to anesthesia: None    Post-anesthesia assessment completed. No concerns.     Signed By: Fara Dotson MD    7/25/2022  Post anesthesia nausea and vomiting:  controlled  Final Post Anesthesia Temperature Assessment:  Normothermia (36.0-37.5 degrees C)      INITIAL Post-op Vital signs:   Vitals Value Taken Time   /82 07/25/22 1536   Temp 37.1 °C (98.7 °F) 07/25/22 1527   Pulse 76 07/25/22 1538 Resp 20 07/25/22 1538   SpO2 98 % 07/25/22 1538   Vitals shown include unvalidated device data.

## 2022-07-25 NOTE — PROGRESS NOTES
Transition of Care Plan:    RUR: 12%  Disposition: Home with Follow-up appointment  Follow up appointments: PCP  DME needed: None  Transportation at Discharge: Pt's wife will transport at d/c.   Kandice Crawford or means to access home:  Pt has access      IM Medicare Letter: 2nd IM letter needed  Is patient a BCPI-A Bundle:   N/A        If yes, was Bundle Letter given?: N/A   Is patient a [place] and connected with the [place]? If yes, was [employer] transfer form completed and VA notified? No  Caregiver Contact: Nori Barron Wife [phone]  Discharge Caregiver contacted prior to discharge? CM will notify caregiver at d/c. Care Conference needed?:  No    Reason for Admission:   Upper GI Bleed; Acute blood loss anemia                    RUR Score: 12 Low risk for readmission                  PCP: First and Last name:   Dr. Domi Diaz   Name of Practice: [facility]   Are you a current patient: Yes/No: Yes   Approximate date of last visit:    Can you participate in a virtual visit if needed: No    Do you (patient/family) have any concerns for transition/discharge? None                Plan for utilizing home health:   Pt is receptive to home health if needed. Current Advanced Directive/Advance Care Plan:  Full Code; No ACP on file. CM addressed with pt about AMD. Pt decline AMD.     Advance Care Planning   Healthcare Decision Maker: Nori Barron Wife [phone]        Click here to complete [address] including selection of the Healthcare Decision Maker Relationship (ie \"Primary\")  Today we documented Decision Maker(s) consistent with Legal Next of Kin hierarchy. CM met with pt and pt's wife at bedside to discuss d/c plan. Pt was alert and oriented. CM verified pt's demographics, insurance and PCP. Pt is a 66 y/o  male admitted to [facility] on 7/23/2022 for Upper GI Bleed; Acute blood loss anemia. Pt's PCP is Dr. Domi Diaz.  Pt uses [facility] [address] [place] for Rx. Pt resides in an one level home with 3 AUTUMN. Pt is independent with ADL's and IADL's. Pt does drive. No DME's. Pt has had HH in the past. No SNF or IPR in the past. Pt is FULL code status. No ACP on file. Pt's wife will transport at d/c. Care Management Interventions  PCP Verified by CM: Yes (Pt's PCP is Dr. Ashley Schultz)  Palliative Care Criteria Met (RRAT>21 & CHF Dx)?: No  Mode of Transport at Discharge: Other (see comment) (Pt's wife will transport at d/c. )  Transition of Care Consult (CM Consult): Discharge Planning (Home with Follow-up appointment)  Discharge Durable Medical Equipment: No (No DME's)  Physical Therapy Consult: No  Occupational Therapy Consult: No  Speech Therapy Consult: No  Support Systems: Spouse/Significant Other (Pt resides in an one level home with 3 AUTUMN. )  Confirm Follow Up Transport: Self (Pt does drive)  [address] Provided?: No  Discharge Location  Patient Expects to be Discharged to[de-identified]  (Home with Follow-up appointment)    CM will continue to follow patient for discharge planning needs and arrange for services as deemed necessary.     Prasad Hart, [facility], [facility]  [phone]

## 2022-07-25 NOTE — ANESTHESIA PREPROCEDURE EVALUATION
Relevant Problems   HEMATOLOGY   (+) Acute blood loss anemia       Anesthetic History   No history of anesthetic complications            Review of Systems / Medical History  Patient summary reviewed, nursing notes reviewed and pertinent labs reviewed    Pulmonary  Within defined limits                 Neuro/Psych       CVA  TIA     Cardiovascular    Hypertension              Exercise tolerance: >4 METS     GI/Hepatic/Renal  Within defined limits              Endo/Other  Within defined limits           Other Findings              Physical Exam    Airway  Mallampati: II  TM Distance: 4 - 6 cm  Neck ROM: normal range of motion   Mouth opening: Normal     Cardiovascular  Regular rate and rhythm,  S1 and S2 normal,  no murmur, click, rub, or gallop  Rhythm: regular  Rate: normal         Dental  No notable dental hx       Pulmonary  Breath sounds clear to auscultation               Abdominal  GI exam deferred       Other Findings            Anesthetic Plan    ASA: 3  Anesthesia type: MAC          Induction: Intravenous  Anesthetic plan and risks discussed with: Patient

## 2022-07-25 NOTE — H&P
Date of Surgery Update:  Madeleine Barkley was seen and examined. History and physical has been reviewed. The patient has been examined.  There have been no significant clinical changes since the completion of the originally dated History and Physical.    Signed By: Erick Dotson MD     July 25, 2022 2:45 PM

## 2022-07-25 NOTE — PERIOP NOTES
Aimee Brown  1950  [id number]    Situation:  Verbal report given from: RN   Procedure: Procedure(s):  ESOPHAGOGASTRODUODENOSCOPY (EGD)  ESOPHAGOGASTRODUODENAL (EGD) BIOPSY    Background:    Preoperative diagnosis: anemia    Postoperative diagnosis: Esophageal Stricture, Vocal Gastritis    :  Dr. Natan Bell    Assistant(s): Endoscopy Technician-1: Merna Adkins  Endoscopy RN-1: Alexandra Moran    Specimens:   ID Type Source Tests Collected by Time Destination   1 : Gastric Bx Preservative Gastric  Corie MD Derrick 7/25/2022 1506 Pathology       Assessment:  Intra-procedure medications   Propofol 120 mg      Anesthesia gave intra-procedure sedation and medications, see anesthesia flow sheet     Intravenous fluids: LR@ KVO     Vital signs stable. Pt alert and oriented. Denies pain and passing flatus    Abdominal assessment: round and soft       Recommendation:    Permission to share finding with caregiver yes    All side rails up, bed in low position, wheels locked. Nurse at bedside.

## 2022-07-26 LAB
ANION GAP SERPL CALC-SCNC: 6 MMOL/L (ref 5–15)
BACTERIA SPEC CULT: ABNORMAL
BASOPHILS # BLD: 0.1 K/UL (ref 0–0.1)
BASOPHILS NFR BLD: 1 % (ref 0–1)
BUN SERPL-MCNC: 11 MG/DL (ref 6–20)
BUN/CREAT SERPL: 16 (ref 12–20)
CALCIUM SERPL-MCNC: 8.6 MG/DL (ref 8.5–10.1)
CHLORIDE SERPL-SCNC: 106 MMOL/L (ref 97–108)
CO2 SERPL-SCNC: 26 MMOL/L (ref 21–32)
CREAT SERPL-MCNC: 0.68 MG/DL (ref 0.7–1.3)
DIFFERENTIAL METHOD BLD: ABNORMAL
EOSINOPHIL # BLD: 0.4 K/UL (ref 0–0.4)
EOSINOPHIL NFR BLD: 4 % (ref 0–7)
ERYTHROCYTE [DISTWIDTH] IN BLOOD BY AUTOMATED COUNT: 16 % (ref 11.5–14.5)
GLUCOSE SERPL-MCNC: 87 MG/DL (ref 65–100)
HCT VFR BLD AUTO: 23.8 % (ref 36.6–50.3)
HGB BLD-MCNC: 7.7 G/DL (ref 12.1–17)
IMM GRANULOCYTES # BLD AUTO: 0.1 K/UL (ref 0–0.04)
IMM GRANULOCYTES NFR BLD AUTO: 1 % (ref 0–0.5)
LYMPHOCYTES # BLD: 1.5 K/UL (ref 0.8–3.5)
LYMPHOCYTES NFR BLD: 15 % (ref 12–49)
MAGNESIUM SERPL-MCNC: 1.9 MG/DL (ref 1.6–2.4)
MCH RBC QN AUTO: 29.2 PG (ref 26–34)
MCHC RBC AUTO-ENTMCNC: 32.4 G/DL (ref 30–36.5)
MCV RBC AUTO: 90.2 FL (ref 80–99)
MONOCYTES # BLD: 1.2 K/UL (ref 0–1)
MONOCYTES NFR BLD: 12 % (ref 5–13)
NEUTS SEG # BLD: 7.1 K/UL (ref 1.8–8)
NEUTS SEG NFR BLD: 67 % (ref 32–75)
NRBC # BLD: 0 K/UL (ref 0–0.01)
NRBC BLD-RTO: 0 PER 100 WBC
PHOSPHATE SERPL-MCNC: 2.3 MG/DL (ref 2.6–4.7)
PLATELET # BLD AUTO: 252 K/UL (ref 150–400)
PMV BLD AUTO: 9.2 FL (ref 8.9–12.9)
POTASSIUM SERPL-SCNC: 3.4 MMOL/L (ref 3.5–5.1)
RBC # BLD AUTO: 2.64 M/UL (ref 4.1–5.7)
SERVICE CMNT-IMP: ABNORMAL
SERVICE CMNT-IMP: ABNORMAL
SODIUM SERPL-SCNC: 138 MMOL/L (ref 136–145)
WBC # BLD AUTO: 10.4 K/UL (ref 4.1–11.1)

## 2022-07-26 PROCEDURE — C9113 INJ PANTOPRAZOLE SODIUM, VIA: HCPCS | Performed by: NURSE PRACTITIONER

## 2022-07-26 PROCEDURE — 74011250636 HC RX REV CODE- 250/636: Performed by: NURSE PRACTITIONER

## 2022-07-26 PROCEDURE — 74011250636 HC RX REV CODE- 250/636: Performed by: HOSPITALIST

## 2022-07-26 PROCEDURE — 74011250637 HC RX REV CODE- 250/637: Performed by: INTERNAL MEDICINE

## 2022-07-26 PROCEDURE — 36415 COLL VENOUS BLD VENIPUNCTURE: CPT

## 2022-07-26 PROCEDURE — 99221 1ST HOSP IP/OBS SF/LOW 40: CPT | Performed by: STUDENT IN AN ORGANIZED HEALTH CARE EDUCATION/TRAINING PROGRAM

## 2022-07-26 PROCEDURE — 74011000250 HC RX REV CODE- 250: Performed by: NURSE PRACTITIONER

## 2022-07-26 PROCEDURE — 83735 ASSAY OF MAGNESIUM: CPT

## 2022-07-26 PROCEDURE — 65270000046 HC RM TELEMETRY

## 2022-07-26 PROCEDURE — 85025 COMPLETE CBC W/AUTO DIFF WBC: CPT

## 2022-07-26 PROCEDURE — 74011000250 HC RX REV CODE- 250: Performed by: INTERNAL MEDICINE

## 2022-07-26 PROCEDURE — 84100 ASSAY OF PHOSPHORUS: CPT

## 2022-07-26 PROCEDURE — 80048 BASIC METABOLIC PNL TOTAL CA: CPT

## 2022-07-26 RX ORDER — ASPIRIN 81 MG/1
81 TABLET ORAL DAILY
Status: DISCONTINUED | OUTPATIENT
Start: 2022-07-26 | End: 2022-07-30 | Stop reason: HOSPADM

## 2022-07-26 RX ORDER — POTASSIUM CHLORIDE 750 MG/1
40 TABLET, FILM COATED, EXTENDED RELEASE ORAL
Status: COMPLETED | OUTPATIENT
Start: 2022-07-26 | End: 2022-07-26

## 2022-07-26 RX ADMIN — SODIUM CHLORIDE, PRESERVATIVE FREE 40 MG: 5 INJECTION INTRAVENOUS at 21:40

## 2022-07-26 RX ADMIN — SODIUM CHLORIDE, PRESERVATIVE FREE 10 ML: 5 INJECTION INTRAVENOUS at 21:40

## 2022-07-26 RX ADMIN — SODIUM CHLORIDE, PRESERVATIVE FREE 40 MG: 5 INJECTION INTRAVENOUS at 09:38

## 2022-07-26 RX ADMIN — SODIUM CHLORIDE, PRESERVATIVE FREE 10 ML: 5 INJECTION INTRAVENOUS at 06:09

## 2022-07-26 RX ADMIN — VANCOMYCIN HYDROCHLORIDE 1000 MG: 1 INJECTION, POWDER, LYOPHILIZED, FOR SOLUTION INTRAVENOUS at 14:14

## 2022-07-26 RX ADMIN — SODIUM CHLORIDE, PRESERVATIVE FREE 10 ML: 5 INJECTION INTRAVENOUS at 14:00

## 2022-07-26 RX ADMIN — POTASSIUM CHLORIDE 40 MEQ: 750 TABLET, FILM COATED, EXTENDED RELEASE ORAL at 09:38

## 2022-07-26 RX ADMIN — SODIUM CHLORIDE, PRESERVATIVE FREE 10 ML: 5 INJECTION INTRAVENOUS at 14:14

## 2022-07-26 RX ADMIN — ASPIRIN 81 MG: 81 TABLET, COATED ORAL at 11:24

## 2022-07-26 NOTE — PROGRESS NOTES
ID:      Consulted for Strep mitis bacteremia. TTE showing possible posterior leaflet MV vegetation. Patient will need GONZALEZ to confirm this. Will see in consultation.        Albert Talavera MD  Infectious Diseases

## 2022-07-26 NOTE — PROGRESS NOTES
Hospitalist Progress Note    NAME: Mau Sims   :  1950   MRN:  [id number]       Assessment / Plan:  Hemorrhagic shock POA  Acute blood loss anemia admit Hgb 7.3 --> 5.8  Probable upper GI bleed POA  Presented with weakness, black stool  Admitted to ICU  HgG dropped from 7.3 --> 5.8, last HgB at [facility] was 14.3 in 2022  Transient pressors, IVF  S/pa 2 units pRBCs, HgB now 8.0 range  CTA abdomen/pelvis in ED         No identified active extravasation        Ectopic right pelvic kidney with nonobstructing stones seen within both kidneys. IV PPI    EGD done which showed   Impression:  -- moderate focal gastritis, possibly a healing ulcer, without active bleeding present today, biopsied  -- proximal esophageal web, widely patent  -- no fareed bleeding nor additional source of anemia/melena seen   Recommendations:  -- PPI BID x 8 weeks  -- await pathology  -- resume diet  -- serial H/H  Discussed with GI who is okay to restart aspirin but continue to hold Plavix for 72 hours    Streptococcal mitis bacteremia  Blood culture came back positive showed a Streptococcus mitis/oralis   IV vancomycin  CXR no ASD or edema  UA  CTA abdomen/pelvis no with acute abnormalities  Await speciation  Follow up repeat BC  Echo done, showed possible posterior leaflet MV vegetation  GONZALEZ needed, consult cardiology. We will keep n.p.o. after midnight    Acute kidney injury POA Admit BUN/creat 96 and 1.97  Last baseline creat 0.87 in 2022  Suspect hypovolemia with the bleeding and shock  IVF, creatinine improved to 0.83  Holding losartan      Prior stroke POA. Essential hypertension POA  Hyperlipidemia POA  Resume aspirin, continue to hold Plavix for 72 hours  Holding losartan with bleed, hypotension, STEFAN              Resume as needed     Body mass index is 18.74 kg/m².      Code status: Full  Prophylaxis: SCD's  Recommended Disposition: Home w/Family      18.5 - 24.9 Normal weight / Body mass index is 18.26 kg/m². Estimated discharge date: July 29  Barriers:    Code status: Full  Prophylaxis: SCD's  Recommended Disposition:  PT, OT, RN     Subjective:     Chief Complaint / Reason for Physician Visit  Follow-up GI bleed, Streptococcus mitis bacteremia,discussed with RN events overnight. No acute issues, patient was made aware of his blood culture and the need for GONZALEZ  Review of Systems:  Symptom Y/N Comments  Symptom Y/N Comments   Fever/Chills n   Chest Pain n    Poor Appetite    Edema     Cough    Abdominal Pain n    Sputum    Joint Pain     SOB/URBAN n   Pruritis/Rash     Nausea/vomit n   Tolerating PT/OT     Diarrhea    Tolerating Diet y    Constipation n   Other       Could NOT obtain due to:      Objective:     VITALS:   Last 24hrs VS reviewed since prior progress note. Most recent are:  Patient Vitals for the past 24 hrs:   Temp Pulse Resp BP SpO2   07/26/22 1646 97.9 °F (36.6 °C) -- -- -- --   07/26/22 1643 -- 83 19 126/76 100 %   07/26/22 1359 98 °F (36.7 °C) 75 17 121/86 100 %   07/26/22 1228 -- 76 14 (!) 141/75 100 %   07/26/22 0427 98 °F (36.7 °C) 75 17 121/86 98 %   07/25/22 2240 97.6 °F (36.4 °C) 76 16 138/70 98 %   07/25/22 2000 -- 67 -- -- --   07/25/22 1908 98.3 °F (36.8 °C) 84 19 115/65 99 %         Intake/Output Summary (Last 24 hours) at 7/26/2022 1800  Last data filed at 7/26/2022 0607  Gross per 24 hour   Intake 240 ml   Output 420 ml   Net -180 ml          I had a face to face encounter and independently examined this patient on 7/26/2022, as outlined below:  PHYSICAL EXAM:  General: WD, WN. Alert, cooperative, no acute distress    EENT:  EOMI. Anicteric sclerae. MMM  Resp:  CTA bilaterally, no wheezing or rales. No accessory muscle use  CV:  Regular  rhythm,  No edema  GI:  Soft, Non distended, Non tender. +Bowel sounds  Neurologic:  Alert and oriented X 3, normal speech,   Psych:   Good insight. Not anxious nor agitated  Skin:  No rashes.   No jaundice    Reviewed most current lab test results and cultures  YES  Reviewed most current radiology test results   YES  Review and summation of old records today    NO  Reviewed patient's current orders and MAR    YES  PMH/SH reviewed - no change compared to H&P  ________________________________________________________________________  Care Plan discussed with:    Comments   Patient x    Family      RN x    Care Manager     Consultant                        Multidiciplinary team rounds were held today with , nursing, pharmacist and clinical coordinator. Patient's plan of care was discussed; medications were reviewed and discharge planning was addressed. ________________________________________________________________________  Total NON critical care TIME:  40   Minutes    Total CRITICAL CARE TIME Spent:   Minutes non procedure based      Comments   >50% of visit spent in counseling and coordination of care     ________________________________________________________________________  Justine Parada MD     Procedures: see electronic medical records for all procedures/Xrays and details which were not copied into this note but were reviewed prior to creation of Plan. LABS:  I reviewed today's most current labs and imaging studies. Pertinent labs include:  Recent Labs     07/26/22  0520 07/25/22  0114 07/24/22  1703 07/24/22  1036 07/24/22  0354   WBC 10.4 9.4  --   --  11.7*   HGB 7.7* 8.2* 8.1*   < > 7.9*   HCT 23.8* 24.2*  --   --  24.3*    251  --   --  226    < > = values in this interval not displayed.        Recent Labs     07/26/22  0520 07/25/22  0114 07/24/22  0354    141 142   K 3.4* 3.6 3.5    110* 112*   CO2 26 25 23   GLU 87 93 93   BUN 11 16 30*   CREA 0.68* 0.64* 0.83   CA 8.6 8.4* 8.6   MG 1.9 1.9 1.9   PHOS 2.3* 2.3* 2.2*         Signed: Justine Parada MD

## 2022-07-26 NOTE — CONSULTS
Infectious Disease Consult Note    Reason for Consult: Strep mitis bacteremia  Date of Consultation: July 26, 2022  Date of Admission: 7/22/2022  Referring Physician: Dr. Justo Hayward      HPI:  Vic Bob is a 67y.o. year old male with history of stroke, on aspirin and Plavix for secondary prevention, hypertension and hyperlipidemia. He was brought to emergency department with complaint of worsening generalized weakness and fatigue. He went to a trip with the family to [place] and on the way back he developed extreme fatigue and also noticed dark-colored stool. On lab work he was found to have hemoglobin of 7.3 and later dropped down to 5.8. He was also hypotensive required low-dose vasopressors. Patient was afebrile on arrival, leukocytosis of 20.2. Blood cultures from 7/22/2022 grew strep mitis/paralysis in 2/2 bottles. TTE showing possible posterior leaflet vegetation on the mitral valve. Patient was started on empiric vancomycin and cefepime on arrival.  Patient also had EGD for the acute anemia; this showed some gastritis but no overt bleeding. Patient denies any dental pain or any recent dental procedures. Has no pacemaker or ICD or prosthetic valves. Past Medical History:  Past Medical History:   Diagnosis Date    Essential hypertension     Hyperlipidemia     Neurological disorder     Stroke [facility])          Surgical History:  Past Surgical History:   Procedure Laterality Date    HX HERNIA REPAIR  2009    HX HERNIA REPAIR  2007    FL ABDOMEN SURGERY PROC UNLISTED  1056,5057    colostomy, reversal         Family History:   Family History   Problem Relation Age of Onset    Heart Attack Father          Social History:   Lives at home. Allergies:   Allergies   Allergen Reactions    Pcn [Penicillins] Unknown (comments)    Sulfa (Sulfonamide Antibiotics) Other (comments)         Review of Systems:     Negative except as in HPI    Medications:  No current facility-administered medications on file prior to encounter. Current Outpatient Medications on File Prior to Encounter   Medication Sig Dispense Refill    coenzyme q10 10 mg cap Take  by mouth. atorvastatin (LIPITOR) 40 mg tablet TAKE 1 TABLET BY MOUTH DAILY. REPEAT FASTING LABS 3-2-2022 BEFORE FURTHER REFILLS 90 Tablet 3    clopidogreL (PLAVIX) 75 mg tab Take 1 Tablet by mouth daily. 90 Tablet 2    losartan (COZAAR) 25 mg tablet Take 1 Tablet by mouth daily. 90 Tablet 2    aspirin delayed-release 81 mg tablet Take 81 mg by mouth daily. cyanocobalamin 1,000 mcg tablet Take 1,000 mcg by mouth.              Physical Exam:    Vitals: Patient Vitals for the past 24 hrs:   Temp Pulse Resp BP SpO2   07/26/22 0427 98 °F (36.7 °C) 75 17 121/86 98 %   07/25/22 2240 97.6 °F (36.4 °C) 76 16 138/70 98 %   07/25/22 2000 -- 67 -- -- --   07/25/22 1908 98.3 °F (36.8 °C) 84 19 115/65 99 %   07/25/22 1603 97.5 °F (36.4 °C) 73 14 (!) 118/50 100 %   07/25/22 1541 -- 67 15 118/65 99 %   07/25/22 1533 -- 67 15 (!) 133/55 98 %   07/25/22 1530 -- 69 15 122/67 97 %   07/25/22 1527 98.7 °F (37.1 °C) 71 17 109/60 97 %   07/25/22 1516 98.2 °F (36.8 °C) 78 12 (!) 99/57 97 %   07/25/22 1414 98 °F (36.7 °C) 90 16 (!) 154/77 99 %   07/25/22 1131 98.3 °F (36.8 °C) 74 15 119/60 98 %   07/25/22 1126 -- 74 11 119/60 98 %     GEN: NAD  HEENT: Normocephalic, atraumatic, PERRL, no scleral icterus  CV: Hemodynamically stable  Lungs: Breathing comfortably  Abdomen: soft, non distended, non tender  Genitourinary:  no tovar  Extremities: no edema  Neuro: Alert, oriented to time, place and situation, moves all extremities to commands, verbal   Skin: no rash  Psych: good affect, good eye contact, non tearful   Lines: PIVs      Labs:   Recent Results (from the past 24 hour(s))   METABOLIC PANEL, BASIC    Collection Time: 07/26/22  5:20 AM   Result Value Ref Range    Sodium 138 136 - 145 mmol/L    Potassium 3.4 (L) 3.5 - 5.1 mmol/L    Chloride 106 97 - 108 mmol/L    CO2 26 21 - 32 mmol/L    Anion gap 6 5 - 15 mmol/L    Glucose 87 65 - 100 mg/dL    BUN 11 6 - 20 MG/DL    Creatinine 0.68 (L) 0.70 - 1.30 MG/DL    BUN/Creatinine ratio 16 12 - 20      GFR est AA >60 >60 ml/min/1.73m2    GFR est non-AA >60 >60 ml/min/1.73m2    Calcium 8.6 8.5 - 10.1 MG/DL   CBC WITH AUTOMATED DIFF    Collection Time: 07/26/22  5:20 AM   Result Value Ref Range    WBC 10.4 4.1 - 11.1 K/uL    RBC 2.64 (L) 4.10 - 5.70 M/uL    HGB 7.7 (L) 12.1 - 17.0 g/dL    HCT 23.8 (L) 36.6 - 50.3 %    MCV 90.2 80.0 - 99.0 FL    MCH 29.2 26.0 - 34.0 PG    MCHC 32.4 30.0 - 36.5 g/dL    RDW 16.0 (H) 11.5 - 14.5 %    PLATELET 254 422 - 459 K/uL    MPV 9.2 8.9 - 12.9 FL    NRBC 0.0 0  WBC    ABSOLUTE NRBC 0.00 0.00 - 0.01 K/uL    NEUTROPHILS 67 32 - 75 %    LYMPHOCYTES 15 12 - 49 %    MONOCYTES 12 5 - 13 %    EOSINOPHILS 4 0 - 7 %    BASOPHILS 1 0 - 1 %    IMMATURE GRANULOCYTES 1 (H) 0.0 - 0.5 %    ABS. NEUTROPHILS 7.1 1.8 - 8.0 K/UL    ABS. LYMPHOCYTES 1.5 0.8 - 3.5 K/UL    ABS. MONOCYTES 1.2 (H) 0.0 - 1.0 K/UL    ABS. EOSINOPHILS 0.4 0.0 - 0.4 K/UL    ABS. BASOPHILS 0.1 0.0 - 0.1 K/UL    ABS. IMM. GRANS. 0.1 (H) 0.00 - 0.04 K/UL    DF AUTOMATED     MAGNESIUM    Collection Time: 07/26/22  5:20 AM   Result Value Ref Range    Magnesium 1.9 1.6 - 2.4 mg/dL   PHOSPHORUS    Collection Time: 07/26/22  5:20 AM   Result Value Ref Range    Phosphorus 2.3 (L) 2.6 - 4.7 MG/DL       Microbiology Data: In HPI        Assessment:   68 yo M admitted for weakness.     -Strep mitis/paralysis bacteremia. Possible mitral valve endocarditis per TTE.    -Acute anemia present on arrival.    -Patient reported allergies to penicillins when he was in the [employer] in the 1970s. Patient has absolutely no recall of what the reaction was and whether or not he has taken any penicillin antibiotics since after that.         Recommendations:  -Continue vancomycin per pharmacy protocol for AUC/BEAR 400-600    -Please obtain GONZALEZ given possible mitral valve vegetation per the TTE.    -I discussed with the patient regarding his allergies to penicillin of which she has no recall from the 1970s. I discussed with him that he has likely outgrown the allergy by this time and is in the correct setting to try simpler penicillins to see whether he still has allergies or not. If he does not have any allergies then it opens up several antibiotic options for him down in the future. Patient said he will think about whether to give that a try. ID will follow. Thank for the opportunity to participate in the care of this patient. Please contact with questions or concerns.            Leatha Rodríguez MD  Infectious Diseases

## 2022-07-26 NOTE — PROGRESS NOTES
Problem: Pressure Injury - Risk of  Goal: *Prevention of pressure injury  Description: Document Joseph Scale and appropriate interventions in the flowsheet. Outcome: Progressing Towards Goal  Note: Pressure Injury Interventions:  Sensory Interventions: Discuss PT/OT consult with provider         Activity Interventions: Increase time out of bed    Mobility Interventions: HOB 30 degrees or less    Nutrition Interventions: Document food/fluid/supplement intake    Friction and Shear Interventions: Apply protective barrier, creams and emollients                Problem: Patient Education: Go to Patient Education Activity  Goal: Patient/Family Education  Outcome: Progressing Towards Goal     Problem: Falls - Risk of  Goal: *Absence of Falls  Description: Document Rubin Fall Risk and appropriate interventions in the flowsheet.   Outcome: Progressing Towards Goal  Variance Other (add note)  Note: Fall Risk Interventions:  Mobility Interventions: Bed/chair exit alarm    Mentation Interventions: Bed/chair exit alarm    Medication Interventions: Bed/chair exit alarm    Elimination Interventions: Bed/chair exit alarm              Problem: Patient Education: Go to Patient Education Activity  Goal: Patient/Family Education  Outcome: Progressing Towards Goal

## 2022-07-26 NOTE — CONSENT
Informed Consent for Blood Component Transfusion Note    I have discussed with the patient the rationale for blood component transfusion; its benefits in treating or preventing fatigue, organ damage, or death; and its risk which includes mild transfusion reactions, rare risk of blood borne infection, or more serious but rare reactions. I have discussed the alternatives to transfusion, including the risk and consequences of not receiving transfusion. The patient had an opportunity to ask questions and had agreed to proceed with transfusion of blood components.     Electronically signed by Dong Baez MD on 7/22/22 at 1:24 AM

## 2022-07-26 NOTE — PROGRESS NOTES
Problem: Pressure Injury - Risk of  Goal: *Prevention of pressure injury  Description: Document Joseph Scale and appropriate interventions in the flowsheet. Outcome: Progressing Towards Goal  Note: Pressure Injury Interventions:  Sensory Interventions: Assess changes in LOC, Assess need for specialty bed    Problem: Falls - Risk of  Goal: *Absence of Falls  Description: Document Rubin Fall Risk and appropriate interventions in the flowsheet.   Outcome: Progressing Towards Goal  Note: Fall Risk Interventions:  Mobility Interventions: Bed/chair exit alarm, Assess mobility with egress test, Patient to call before getting OOB    Mentation Interventions: Bed/chair exit alarm, Adequate sleep, hydration, pain control, Evaluate medications/consider consulting pharmacy    Medication Interventions: Assess postural VS orthostatic hypotension, Evaluate medications/consider consulting pharmacy, Patient to call before getting OOB    Elimination Interventions: Call light in reach, Bed/chair exit alarm, Patient to call for help with toileting needs    Activity Interventions: Increase time out of bed, Pressure redistribution bed/mattress(bed type)    Mobility Interventions: Assess need for specialty bed    Nutrition Interventions: Document food/fluid/supplement intake    Friction and Shear Interventions: Apply protective barrier, creams and emollients

## 2022-07-26 NOTE — PROGRESS NOTES
End of Shift Note    Bedside shift change report given to Carole Cardenas (oncoming nurse) by Jay Arellano RN (offgoing nurse). Report included the following information SBAR, Intake/Output, Recent Results, and Cardiac Rhythm NSR    Shift worked:  7p[-7a     Shift summary and any significant changes:     Pt asking to advance diet     Concerns for physician to address:  K 3.4, Hgb 7.7     Zone phone for oncoming shift:   [phone]       Activity:  Activity Level: Bath Room Privileges, Up with Assistance  Number times ambulated in hallways past shift: 0  Number of times OOB to chair past shift: 1    Cardiac:   Cardiac Monitoring: Yes      Cardiac Rhythm: Sinus Rhythm    Access:   Current line(s): PIV     Genitourinary:   Urinary status: voiding    Respiratory:   O2 Device: None (Room air)  Chronic home O2 use?: NO  Incentive spirometer at bedside: N/A       GI:  Last Bowel Movement Date:  (PTA)  Current diet:  ADULT ORAL NUTRITION SUPPLEMENT Breakfast, Lunch, Dinner; Clear Liquid  ADULT DIET Clear Liquid  Passing flatus: YES  Tolerating current diet: YES       Pain Management:   Patient states pain is manageable on current regimen: N/A    Skin:  Joseph Score: 19  Interventions: increase time out of bed and nutritional support     Patient Safety:  Fall Score:  Total Score: 3  Interventions: bed/chair alarm, assistive device (walker, cane, etc), gripper socks, pt to call before getting OOB, and stay with me (per policy)  High Fall Risk: Yes    Length of Stay:  Expected LOS: 4d 9h  Actual LOS: 3      Jay Arellano RN

## 2022-07-27 LAB
ANION GAP SERPL CALC-SCNC: 8 MMOL/L (ref 5–15)
BUN SERPL-MCNC: 12 MG/DL (ref 6–20)
BUN/CREAT SERPL: 18 (ref 12–20)
CALCIUM SERPL-MCNC: 8.8 MG/DL (ref 8.5–10.1)
CHLORIDE SERPL-SCNC: 106 MMOL/L (ref 97–108)
CO2 SERPL-SCNC: 24 MMOL/L (ref 21–32)
CREAT SERPL-MCNC: 0.68 MG/DL (ref 0.7–1.3)
ERYTHROCYTE [DISTWIDTH] IN BLOOD BY AUTOMATED COUNT: 16 % (ref 11.5–14.5)
GLUCOSE SERPL-MCNC: 90 MG/DL (ref 65–100)
HCT VFR BLD AUTO: 26.9 % (ref 36.6–50.3)
HGB BLD-MCNC: 8.8 G/DL (ref 12.1–17)
MAGNESIUM SERPL-MCNC: 1.8 MG/DL (ref 1.6–2.4)
MCH RBC QN AUTO: 29.2 PG (ref 26–34)
MCHC RBC AUTO-ENTMCNC: 32.7 G/DL (ref 30–36.5)
MCV RBC AUTO: 89.4 FL (ref 80–99)
NRBC # BLD: 0 K/UL (ref 0–0.01)
NRBC BLD-RTO: 0 PER 100 WBC
PHOSPHATE SERPL-MCNC: 2.5 MG/DL (ref 2.6–4.7)
PLATELET # BLD AUTO: 302 K/UL (ref 150–400)
PMV BLD AUTO: 9.1 FL (ref 8.9–12.9)
POTASSIUM SERPL-SCNC: 3.8 MMOL/L (ref 3.5–5.1)
RBC # BLD AUTO: 3.01 M/UL (ref 4.1–5.7)
SODIUM SERPL-SCNC: 138 MMOL/L (ref 136–145)
WBC # BLD AUTO: 12.3 K/UL (ref 4.1–11.1)

## 2022-07-27 PROCEDURE — 83735 ASSAY OF MAGNESIUM: CPT

## 2022-07-27 PROCEDURE — C9113 INJ PANTOPRAZOLE SODIUM, VIA: HCPCS | Performed by: NURSE PRACTITIONER

## 2022-07-27 PROCEDURE — 80048 BASIC METABOLIC PNL TOTAL CA: CPT

## 2022-07-27 PROCEDURE — 84100 ASSAY OF PHOSPHORUS: CPT

## 2022-07-27 PROCEDURE — 74011000250 HC RX REV CODE- 250: Performed by: INTERNAL MEDICINE

## 2022-07-27 PROCEDURE — 74011250636 HC RX REV CODE- 250/636: Performed by: HOSPITALIST

## 2022-07-27 PROCEDURE — 74011250636 HC RX REV CODE- 250/636: Performed by: NURSE PRACTITIONER

## 2022-07-27 PROCEDURE — 65270000046 HC RM TELEMETRY

## 2022-07-27 PROCEDURE — 85027 COMPLETE CBC AUTOMATED: CPT

## 2022-07-27 PROCEDURE — 74011250637 HC RX REV CODE- 250/637: Performed by: STUDENT IN AN ORGANIZED HEALTH CARE EDUCATION/TRAINING PROGRAM

## 2022-07-27 PROCEDURE — 74011250637 HC RX REV CODE- 250/637: Performed by: INTERNAL MEDICINE

## 2022-07-27 PROCEDURE — 36415 COLL VENOUS BLD VENIPUNCTURE: CPT

## 2022-07-27 PROCEDURE — 74011000250 HC RX REV CODE- 250: Performed by: NURSE PRACTITIONER

## 2022-07-27 RX ORDER — PANTOPRAZOLE SODIUM 40 MG/1
40 TABLET, DELAYED RELEASE ORAL
Status: DISCONTINUED | OUTPATIENT
Start: 2022-07-27 | End: 2022-07-30 | Stop reason: HOSPADM

## 2022-07-27 RX ADMIN — ASPIRIN 81 MG: 81 TABLET, COATED ORAL at 09:06

## 2022-07-27 RX ADMIN — SODIUM CHLORIDE, PRESERVATIVE FREE 40 MG: 5 INJECTION INTRAVENOUS at 09:06

## 2022-07-27 RX ADMIN — SODIUM CHLORIDE, PRESERVATIVE FREE 10 ML: 5 INJECTION INTRAVENOUS at 13:29

## 2022-07-27 RX ADMIN — PANTOPRAZOLE SODIUM 40 MG: 40 TABLET, DELAYED RELEASE ORAL at 15:33

## 2022-07-27 RX ADMIN — VANCOMYCIN HYDROCHLORIDE 1000 MG: 1 INJECTION, POWDER, LYOPHILIZED, FOR SOLUTION INTRAVENOUS at 09:06

## 2022-07-27 RX ADMIN — SODIUM CHLORIDE, PRESERVATIVE FREE 10 ML: 5 INJECTION INTRAVENOUS at 22:58

## 2022-07-27 RX ADMIN — SODIUM CHLORIDE, PRESERVATIVE FREE 10 ML: 5 INJECTION INTRAVENOUS at 14:00

## 2022-07-27 NOTE — CONSULTS
Consult received for GONZALEZ. Per notes, pt is refusing procedure today. Will plan for GONZALEZ on Friday PM with Dr. Kristel Ocampo. Please keep NPO p MN after Thursday. Discussed with pt.     9 Zulema Thornton NP  7/27/2022  9:41 AM

## 2022-07-27 NOTE — PROGRESS NOTES
Comprehensive Nutrition Assessment    Type and Reason for Visit: Reassess    Nutrition Recommendations/Plan:   Advance to a regular diet  Switch supplement to ensure high protein BID     Nutrition Assessment:   Patient medically noted for GI bleed, anemia, and bacteremia. PMH stroke, HTN, and HLD. Patient NPO today for possible GONZALEZ; previous advanced to a regular diet yesterday. He reports they are unable to do the procedure today but remains NPO at this time. States he is hungry. Drinks boost at home and did not like the ensure clear. Will switch ONS to ensure high protein due to patient's chocolate flavor preference. Advance diet if no plans for procedures. Encouraged intake of meals. Nutrition Related Findings:    Phos 2.5, BG [id number]  Protonix     Current Nutrition Intake & Therapies:  ADULT ORAL NUTRITION SUPPLEMENT Breakfast, Lunch, Dinner; Clear Liquid  DIET NPO    Anthropometric Measures:  Height: 6' 2\" (188 cm)  Ideal Body Weight (IBW): 190 lbs (86 kg)     Current Body Wt:  64.5 kg (142 lb 3.2 oz), 74.8 % IBW.  Bed scale  Current BMI (kg/m2): 18.2        Weight Adjustment: No adjustment                 BMI Category: Underweight (BMI less than 18.5)    Estimated Daily Nutrient Needs:  Energy Requirements Based On: Formula  Weight Used for Energy Requirements: Current  Energy (kcal/day): 2161 kcal (BMR 1470 x 1.3AF +250kcal)  Weight Used for Protein Requirements: Current  Protein (g/day): 85-98g (1.3-1.5g/kg bw)  Method Used for Fluid Requirements: 1 ml/kcal  Fluid (ml/day): 2150 mL    Nutrition Diagnosis:   Inadequate protein-energy intake related to  (possible testing) as evidenced by NPO or clear liquid status due to medical condition    Nutrition Interventions:   Food and/or Nutrient Delivery: Start oral diet, Modify oral nutrition supplement  Nutrition Education/Counseling: No recommendations at this time  Coordination of Nutrition Care: Continue to monitor while inpatient       Goals:  Previous Goal Met: Goal(s) achieved  Goals:  (Diet restated and PO >70% of meals/ONS next 3-5 days)       Nutrition Monitoring and Evaluation:   Behavioral-Environmental Outcomes: None identified  Food/Nutrient Intake Outcomes: Food and nutrient intake, Supplement intake  Physical Signs/Symptoms Outcomes: Biochemical data, Weight    Discharge Planning:     (Regular diet + ONS)    El Reardon RD  Contact: ext [phone]

## 2022-07-27 NOTE — PROGRESS NOTES
Spiritual Care Assessment/Progress Note  [facility]      NAME: Madeleine Barkley      MRN: [id number]  AGE: 67 y.o.  SEX: male  Uatsdin Affiliation: Presbyterian   Language: English     7/27/2022     Total Time (in minutes): 20     Spiritual Assessment begun in [facility] PROGRESSIVE CARE through conversation with:         [x]Patient        [x] Family    [] Friend(s)        Reason for Consult: Initial/Spiritual assessment, patient floor     Spiritual beliefs: (Please include comment if needed)     [x] Identifies with a sharda tradition:         [] Supported by a sharda community:            [] Claims no spiritual orientation:           [] Seeking spiritual identity:                [] Adheres to an individual form of spirituality:           [] Not able to assess:                           Identified resources for coping:      [x] Prayer                               [] Music                  [] Guided Imagery     [x] Family/friends                 [] Pet visits     [] Devotional reading                         [] Unknown     [] Other:                                                Interventions offered during this visit: (See comments for more details)    Patient Interventions: Coping skills reviewed/reinforced, Initial/Spiritual assessment, patient floor, Life review/legacy, Normalization of emotional/spiritual concerns, Prayer (assurance of), Uatsdin beliefs/image of God discussed, Integration of medical assessment with existing values and beliefs, Affirmation of sharda, Catharsis/review of pertinent events in supportive environment     Family/Friend(s): Normalization of emotional/spiritual concerns, Prayer (assurance of), Initial Assessment, Integration of medical assessment with existing values and beliefs, Coping skills reviewed/reinforced, Uatsdin beliefs/image of God discussed     Plan of Care:     [] Support spiritual and/or cultural needs    [] Support AMD and/or advance care planning process [] Support grieving process   [] Coordinate Rites and/or Rituals    [] Coordination with community clergy   [x] No spiritual needs identified at this time   [] Detailed Plan of Care below (See Comments)  [] Make referral to Music Therapy  [] Make referral to Pet Therapy     [] Make referral to Addiction services  [] [facility] to [facility]  [] Make referral to Spiritual Care Partner  [] No future visits requested        [x] Contact Spiritual Care for further referrals     Comments:   initiated visit for initial spiritual assessment. Patient was in bed awake and alert. His wife Paula River of 32 years was present. Patient and spouse engaged  id life review and story telling. Patient got sick whiles they were out of state visiting family. A good friend drove him back to this facility where he wanted to be treated. He stated he was doing much better at this time. He tries to make others happy as a way of uplifting his own spirit. They have a good support system; children, grand children and great friends. Patient shared that he prays regularly.  listened actively and affirmed their thoughts and feelings. Patient and spouse thanked  for the visit. Visited by: Rachell Romano.    Paging Service: [phone])

## 2022-07-27 NOTE — PROGRESS NOTES
Hospitalist Progress Note    NAME: Shaka Bush   :  1950   MRN:  [id number]       Assessment / Plan:  Hemorrhagic shock POA  Acute blood loss anemia admit Hgb 7.3 --> 5.8  Probable upper GI bleed POA  Presented with weakness, black stool  Admitted to ICU  HgG dropped from 7.3 --> 5.8, last HgB at [facility] was 14.3 in 2022  Transient pressors, IVF  S/pa 2 units pRBCs, HgB now 8.0 range  CTA abdomen/pelvis in ED         No identified active extravasation        Ectopic right pelvic kidney with nonobstructing stones seen within both kidneys. IV PPI    EGD done which showed    Impression:  -- moderate focal gastritis, possibly a healing ulcer, without active bleeding present today, biopsied  -- proximal esophageal web, widely patent  -- no fareed bleeding nor additional source of anemia/melena seen   Recommendations:  -- PPI BID x 8 weeks  -- await pathology  -- resume diet  -- serial H/H  Discussed with GI who is okay to restart aspirin but continue to hold Plavix for 72 hours    Streptococcal mitis bacteremia  Blood culture came back positive showed a Streptococcus mitis/oralis   IV vancomycin  CXR no ASD or edema  UA  CTA abdomen/pelvis no with acute abnormalities  Await speciation  Follow up repeat BC  Echo done, showed possible posterior leaflet MV vegetation  GONZALEZ needed, consult cardiology. Friday       Acute kidney injury POA Admit BUN/creat 96 and 1.97  Last baseline creat 0.87 in 2022  Suspect hypovolemia with the bleeding and shock  IVF, creatinine improved to 0.83  Holding losartan      Prior stroke POA. Essential hypertension POA  Hyperlipidemia POA  Resume aspirin, continue to hold Plavix for 72 hours  Holding losartan with bleed, hypotension, STEFAN              Resume as needed     Body mass index is 18.74 kg/m². Code status: Full  Prophylaxis: SCD's  Recommended Disposition: Home w/Family      18.5 - 24.9 Normal weight / Body mass index is 18.26 kg/m².     Estimated discharge date: July 29  Barriers:    Code status: Full  Prophylaxis: SCD's  Recommended Disposition:  PT, OT, RN     Subjective:     Chief Complaint / Reason for Physician Visit  Follow-up GI bleed, Streptococcus mitis bacteremia,discussed with RN events overnight. No acute issues, patient was made aware of his blood culture and the need for GONZALEZ  Review of Systems:  Symptom Y/N Comments  Symptom Y/N Comments   Fever/Chills n   Chest Pain n    Poor Appetite    Edema     Cough    Abdominal Pain n    Sputum    Joint Pain     SOB/URBAN n   Pruritis/Rash     Nausea/vomit n   Tolerating PT/OT     Diarrhea    Tolerating Diet y    Constipation n   Other       Could NOT obtain due to:      Objective:     VITALS:   Last 24hrs VS reviewed since prior progress note. Most recent are:  Patient Vitals for the past 24 hrs:   Temp Pulse Resp BP SpO2   07/27/22 1533 98.9 °F (37.2 °C) 80 -- 119/68 97 %   07/27/22 1051 97.8 °F (36.6 °C) 93 18 (!) 153/86 --   07/27/22 0728 97.8 °F (36.6 °C) 93 -- (!) 153/86 --   07/27/22 0525 97.7 °F (36.5 °C) 81 18 139/62 99 %   07/26/22 2321 98 °F (36.7 °C) 75 19 (!) 115/55 94 %   07/26/22 1932 98.3 °F (36.8 °C) 87 16 (!) 118/57 99 %       Intake/Output Summary (Last 24 hours) at 7/27/2022 1705  Last data filed at 7/27/2022 0217  Gross per 24 hour   Intake --   Output 800 ml   Net -800 ml        I had a face to face encounter and independently examined this patient on 7/27/2022, as outlined below:  PHYSICAL EXAM:  General: WD, WN. Alert, cooperative, no acute distress    EENT:  EOMI. Anicteric sclerae. MMM  Resp:  CTA bilaterally, no wheezing or rales. No accessory muscle use  CV:  Regular  rhythm,  No edema  GI:  Soft, Non distended, Non tender. +Bowel sounds  Neurologic:  Alert and oriented X 3, normal speech,   Psych:   Good insight. Not anxious nor agitated  Skin:  No rashes.   No jaundice    Reviewed most current lab test results and cultures  YES  Reviewed most current radiology test results YES  Review and summation of old records today    NO  Reviewed patient's current orders and MAR    YES  PMH/SH reviewed - no change compared to H&P  ________________________________________________________________________  Care Plan discussed with:    Comments   Patient x    Family      RN x    Care Manager     Consultant                        Multidiciplinary team rounds were held today with , nursing, pharmacist and clinical coordinator. Patient's plan of care was discussed; medications were reviewed and discharge planning was addressed. ________________________________________________________________________  Total NON critical care TIME:  40   Minutes    Total CRITICAL CARE TIME Spent:   Minutes non procedure based      Comments   >50% of visit spent in counseling and coordination of care     ________________________________________________________________________  Ilan Wilcox MD     Procedures: see electronic medical records for all procedures/Xrays and details which were not copied into this note but were reviewed prior to creation of Plan. LABS:  I reviewed today's most current labs and imaging studies. Pertinent labs include:  Recent Labs     07/27/22 0535 07/26/22  0520 07/25/22  0114   WBC 12.3* 10.4 9.4   HGB 8.8* 7.7* 8.2*   HCT 26.9* 23.8* 24.2*    252 251     Recent Labs     07/27/22  0535 07/26/22  0520 07/25/22  0114    138 141   K 3.8 3.4* 3.6    106 110*   CO2 24 26 25   GLU 90 87 93   BUN 12 11 16   CREA 0.68* 0.68* 0.64*   CA 8.8 8.6 8.4*   MG 1.8 1.9 1.9   PHOS 2.5* 2.3* 2.3*       Signed:  Ilan Wilcox MD

## 2022-07-27 NOTE — PROGRESS NOTES
Problem: Pressure Injury - Risk of  Goal: *Prevention of pressure injury  Description: Document Joseph Scale and appropriate interventions in the flowsheet. Outcome: Progressing Towards Goal  Variance Information/Data  Note: Pressure Injury Interventions:  Sensory Interventions: Assess changes in LOC, Float heels         Activity Interventions: Increase time out of bed    Mobility Interventions: HOB 30 degrees or less    Nutrition Interventions: Document food/fluid/supplement intake    Friction and Shear Interventions: Apply protective barrier, creams and emollients                Problem: Patient Education: Go to Patient Education Activity  Goal: Patient/Family Education  Outcome: Progressing Towards Goal     Problem: Falls - Risk of  Goal: *Absence of Falls  Description: Document Rubin Fall Risk and appropriate interventions in the flowsheet.   Outcome: Progressing Towards Goal  Variance Other (add note)  Note: Fall Risk Interventions:  Mobility Interventions: Bed/chair exit alarm    Mentation Interventions: Bed/chair exit alarm    Medication Interventions: Bed/chair exit alarm    Elimination Interventions: Bed/chair exit alarm              Problem: Patient Education: Go to Patient Education Activity  Goal: Patient/Family Education  Outcome: Progressing Towards Goal

## 2022-07-27 NOTE — PROGRESS NOTES
End of Shift Note    Bedside shift change report given to Raquel CHRISTOPHER (oncoming nurse) by Suleman Waite RN (offgoing nurse). Report included the following information SBAR, Kardex, Intake/Output, MAR, Recent Results, and Cardiac Rhythm NSR    Shift worked:  7p-7a     Shift summary and any significant changes:     Slept well, pt expressing concerns about discussion c ID regarding PCN allergy     Concerns for physician to address:  Held pt NPO for GONZALEZ however pt states he does not want this procedure today     Zone phone for oncoming shift:   7p-7a       Activity:  Activity Level: Bath Room Privileges  Number times ambulated in hallways past shift: 0  Number of times OOB to chair past shift: 0    Cardiac:   Cardiac Monitoring: Yes      Cardiac Rhythm: Sinus Rhythm    Access:   Current line(s): PIV     Genitourinary:   Urinary status: voiding    Respiratory:   O2 Device: None (Room air)  Chronic home O2 use?: NO  Incentive spirometer at bedside: NO       GI:  Last Bowel Movement Date:  (PTA)  Current diet:  ADULT ORAL NUTRITION SUPPLEMENT Breakfast, Lunch, Dinner; Clear Liquid  DIET NPO  Passing flatus: YES  Tolerating current diet: YES       Pain Management:   Patient states pain is manageable on current regimen: N/A    Skin:  Joseph Score: 19  Interventions: increase time out of bed and nutritional support     Patient Safety:  Fall Score:  Total Score: 3  Interventions: bed/chair alarm, assistive device (walker, cane, etc), gripper socks, pt to call before getting OOB, and stay with me (per policy)  High Fall Risk: Yes    Length of Stay:  Expected LOS: 4d 9h  Actual LOS: 4      Suleman Waite RN

## 2022-07-28 PROCEDURE — 65270000046 HC RM TELEMETRY

## 2022-07-28 PROCEDURE — 74011250637 HC RX REV CODE- 250/637: Performed by: STUDENT IN AN ORGANIZED HEALTH CARE EDUCATION/TRAINING PROGRAM

## 2022-07-28 PROCEDURE — 74011000250 HC RX REV CODE- 250: Performed by: NURSE PRACTITIONER

## 2022-07-28 PROCEDURE — 74011250636 HC RX REV CODE- 250/636: Performed by: HOSPITALIST

## 2022-07-28 PROCEDURE — 74011250637 HC RX REV CODE- 250/637: Performed by: INTERNAL MEDICINE

## 2022-07-28 PROCEDURE — 74011000250 HC RX REV CODE- 250: Performed by: INTERNAL MEDICINE

## 2022-07-28 PROCEDURE — 99233 SBSQ HOSP IP/OBS HIGH 50: CPT | Performed by: STUDENT IN AN ORGANIZED HEALTH CARE EDUCATION/TRAINING PROGRAM

## 2022-07-28 RX ADMIN — SODIUM CHLORIDE, PRESERVATIVE FREE 10 ML: 5 INJECTION INTRAVENOUS at 23:34

## 2022-07-28 RX ADMIN — SODIUM CHLORIDE, PRESERVATIVE FREE 10 ML: 5 INJECTION INTRAVENOUS at 14:40

## 2022-07-28 RX ADMIN — VANCOMYCIN HYDROCHLORIDE 1000 MG: 1 INJECTION, POWDER, LYOPHILIZED, FOR SOLUTION INTRAVENOUS at 20:38

## 2022-07-28 RX ADMIN — PANTOPRAZOLE SODIUM 40 MG: 40 TABLET, DELAYED RELEASE ORAL at 16:40

## 2022-07-28 RX ADMIN — PANTOPRAZOLE SODIUM 40 MG: 40 TABLET, DELAYED RELEASE ORAL at 08:24

## 2022-07-28 RX ADMIN — SODIUM CHLORIDE, PRESERVATIVE FREE 10 ML: 5 INJECTION INTRAVENOUS at 16:41

## 2022-07-28 RX ADMIN — ASPIRIN 81 MG: 81 TABLET, COATED ORAL at 08:24

## 2022-07-28 RX ADMIN — VANCOMYCIN HYDROCHLORIDE 1000 MG: 1 INJECTION, POWDER, LYOPHILIZED, FOR SOLUTION INTRAVENOUS at 01:50

## 2022-07-28 NOTE — PROGRESS NOTES
End of Shift Note    Bedside shift change report given to Hollie Adame (oncoming nurse) by Carlos Lopez RN (offgoing nurse). Report included the following information SBAR, Kardex, Intake/Output, MAR, and Cardiac Rhythm NSR    Shift worked:  7p-7a     Shift summary and any significant changes:     none     Concerns for physician to address:  none     Zone phone for oncoming shift:   [phone]       Activity:  Activity Level: Bath Room Privileges  Number times ambulated in hallways past shift: 0  Number of times OOB to chair past shift: 0    Cardiac:   Cardiac Monitoring: Yes      Cardiac Rhythm: Sinus Rhythm    Access:   Current line(s): PIV     Genitourinary:   Urinary status: voiding    Respiratory:   O2 Device: None (Room air)  Chronic home O2 use?: NO  Incentive spirometer at bedside: NO       GI:  Last Bowel Movement Date:  (PTA)  Current diet:  ADULT ORAL NUTRITION SUPPLEMENT Breakfast, Dinner; Low Calorie/High Protein  ADULT DIET Regular  DIET NPO Sips of Water with Meds  Passing flatus: YES  Tolerating current diet: YES       Pain Management:   Patient states pain is manageable on current regimen: YES    Skin:  Joseph Score: 20  Interventions: float heels, increase time out of bed, and nutritional support     Patient Safety:  Fall Score:  Total Score: 3  Interventions: bed/chair alarm, gripper socks, pt to call before getting OOB, and stay with me (per policy)  High Fall Risk: Yes    Length of Stay:  Expected LOS: 4d 9h  Actual LOS: 5      Carlos Lopez RN

## 2022-07-28 NOTE — PROGRESS NOTES
End of Shift Note    Bedside shift change report given to Margot Rodriguez RN (oncoming nurse) by Nel Blancas RN (offgoing nurse). Report included the following information SBAR, Kardex, Recent Results, Cardiac Rhythm NS, Procedure Verification, and Quality Measures    Shift worked:  5639-4024     Shift summary and any significant changes:    No changes  Pt was stable  No PRN meds requested  Pt aware pending GONZALEZ tomorrow     Concerns for physician to address:       Zone phone for oncoming shift:   [phone]       Activity:  Activity Level: Up with Assistance  Number times ambulated in hallways past shift: 1  Number of times OOB to chair past shift: 1    Cardiac:   Cardiac Monitoring: Yes      Cardiac Rhythm: Sinus Rhythm    Access:   Current line(s): PIV     Genitourinary:   Urinary status: voiding    Respiratory:   O2 Device: None (Room air)  Chronic home O2 use?: NO  Incentive spirometer at bedside: YES       GI:  Last Bowel Movement Date:  (PTA)  Current diet:  ADULT ORAL NUTRITION SUPPLEMENT Breakfast, Dinner; Low Calorie/High Protein  ADULT DIET Regular  DIET NPO Sips of Water with Meds  Passing flatus: YES  Tolerating current diet: YES       Pain Management:   Patient states pain is manageable on current regimen: YES    Skin:  Joseph Score: 20  Interventions: increase time out of bed, PT/OT consult, and nutritional support     Patient Safety:  Fall Score:  Total Score: 3  Interventions: gripper socks and pt to call before getting OOB  High Fall Risk: Yes    Length of Stay:  Expected LOS: 4d 9h  Actual LOS: [address]

## 2022-07-28 NOTE — PROGRESS NOTES
Hospitalist Progress Note    NAME: Dallas Solomon   :  1950   MRN:  [id number]       Assessment / Plan:  Hemorrhagic shock POA  Acute blood loss anemia admit Hgb 7.3 --> 5.8  Probable upper GI bleed POA  Presented with weakness, black stool  Admitted to ICU  HgG dropped from 7.3 --> 5.8, last HgB at [facility] was 14.3 in 2022  Transient pressors, IVF  S/pa 2 units pRBCs, HgB now 8.0 range  CTA abdomen/pelvis in ED         No identified active extravasation        Ectopic right pelvic kidney with nonobstructing stones seen within both kidneys. IV PPI    EGD done which showed    Impression:  -- moderate focal gastritis, possibly a healing ulcer, without active bleeding present today, biopsied  -- proximal esophageal web, widely patent  -- no fareed bleeding nor additional source of anemia/melena seen   Recommendations:  -- PPI BID x 8 weeks  -- await pathology  -- resume diet  -- serial H/H  Discussed with GI who is okay to restart aspirin but continue to hold Plavix for 72 hours    Streptococcal mitis bacteremia  Blood culture came back positive showed a Streptococcus mitis/oralis   IV vancomycin  CXR no ASD or edema  UA  CTA abdomen/pelvis no with acute abnormalities  Await speciation  Follow up repeat BC  Echo done, showed possible posterior leaflet MV vegetation  GONZALEZ needed, consult cardiology. GONZALEZ tomorrow  NPO midnight    Acute kidney injury POA Admit BUN/creat 96 and 1.97  Last baseline creat 0.87 in 2022  Suspect hypovolemia with the bleeding and shock  IVF, creatinine improved to 0.83  Holding losartan      Prior stroke POA. Essential hypertension POA  Hyperlipidemia POA  Resume aspirin, continue to hold Plavix for 72 hours  Holding losartan with bleed, hypotension, STEFAN              Resume as needed     Body mass index is 18.74 kg/m².      Code status: Full  Prophylaxis: SCD's  Recommended Disposition: Home w/Family      18.5 - 24.9 Normal weight / Body mass index is 18.62 kg/m². Estimated discharge date: July 29  Barriers:    Code status: Full  Prophylaxis: SCD's  Recommended Disposition:  PT, OT, RN     Subjective:     Chief Complaint / Reason for Physician Visit  Follow-up GI bleed, Streptococcus mitis bacteremia,discussed with RN events overnight. No acute issues, patient was made aware of his blood culture and the need for GONZALEZ  Review of Systems:  Symptom Y/N Comments  Symptom Y/N Comments   Fever/Chills n   Chest Pain n    Poor Appetite    Edema     Cough    Abdominal Pain n    Sputum    Joint Pain     SOB/URBAN n   Pruritis/Rash     Nausea/vomit n   Tolerating PT/OT     Diarrhea    Tolerating Diet y    Constipation n   Other       Could NOT obtain due to:      Objective:     VITALS:   Last 24hrs VS reviewed since prior progress note. Most recent are:  Patient Vitals for the past 24 hrs:   Temp Pulse Resp BP SpO2   07/28/22 0746 99.4 °F (37.4 °C) 92 16 135/65 98 %   07/28/22 0407 98.5 °F (36.9 °C) 89 16 120/60 97 %   07/27/22 2252 98.7 °F (37.1 °C) 75 14 123/60 97 %   07/27/22 1921 98.4 °F (36.9 °C) 90 16 127/62 99 %   07/27/22 1533 98.9 °F (37.2 °C) 80 -- 119/68 97 %   07/27/22 1051 97.8 °F (36.6 °C) 93 18 (!) 153/86 --         Intake/Output Summary (Last 24 hours) at 7/28/2022 0826  Last data filed at 7/28/2022 0407  Gross per 24 hour   Intake 250 ml   Output 825 ml   Net -575 ml          I had a face to face encounter and independently examined this patient on 7/28/2022, as outlined below:  PHYSICAL EXAM:  General: WD, WN. Alert, cooperative, no acute distress    EENT:  EOMI. Anicteric sclerae. MMM  Resp:  CTA bilaterally, no wheezing or rales. No accessory muscle use  CV:  Regular  rhythm,  No edema  GI:  Soft, Non distended, Non tender. +Bowel sounds  Neurologic:  Alert and oriented X 3, normal speech,   Psych:   Good insight. Not anxious nor agitated  Skin:  No rashes.   No jaundice    Reviewed most current lab test results and cultures  YES  Reviewed most current radiology test results   YES  Review and summation of old records today    NO  Reviewed patient's current orders and MAR    YES  PMH/SH reviewed - no change compared to H&P  ________________________________________________________________________  Care Plan discussed with:    Comments   Patient x    Family      RN x    Care Manager     Consultant                        Multidiciplinary team rounds were held today with , nursing, pharmacist and clinical coordinator. Patient's plan of care was discussed; medications were reviewed and discharge planning was addressed. ________________________________________________________________________  Total NON critical care TIME:  40   Minutes    Total CRITICAL CARE TIME Spent:   Minutes non procedure based      Comments   >50% of visit spent in counseling and coordination of care     ________________________________________________________________________  Olimpia Palm MD     Procedures: see electronic medical records for all procedures/Xrays and details which were not copied into this note but were reviewed prior to creation of Plan. LABS:  I reviewed today's most current labs and imaging studies. Pertinent labs include:  Recent Labs     07/27/22  0535 07/26/22 0520   WBC 12.3* 10.4   HGB 8.8* 7.7*   HCT 26.9* 23.8*    252       Recent Labs     07/27/22  0535 07/26/22 0520    138   K 3.8 3.4*    106   CO2 24 26   GLU 90 87   BUN 12 11   CREA 0.68* 0.68*   CA 8.8 8.6   MG 1.8 1.9   PHOS 2.5* 2.3*         Signed:  Olimpia Palm MD

## 2022-07-28 NOTE — PROGRESS NOTES
Infectious Disease Progress Note         Interval:  No acute events    Subjective:   Patient seen in follow-up this afternoon. Reported to be doing well. Had no concerns. Objective:    Vitals:   Reviewed in chart. Physical Exam:  GEN: NAD  HEENT: Normocephalic, atraumatic, PERRL, no scleral icterus  CV: Hemodynamically stable  Lungs: Breathing comfortably  Abdomen: soft, non distended, non tender  Genitourinary:  no tovar  Extremities: no edema  Neuro: Alert, oriented to time, place and situation, moves all extremities to commands, verbal  Skin: no rash  Psych: good affect, good eye contact, non tearful  Lines: PIVs        Labs:  No results found for this or any previous visit (from the past 24 hour(s)). Assessment:  66 yo M admitted for weakness.     -Strep mitis/paralysis bacteremia. Possible mitral valve endocarditis per TTE.     -Acute anemia present on arrival.       -Patient reported allergies to penicillins when he was in the [employer] in the 1970s. Patient has absolutely no recall of what the reaction was and whether or not he has taken any penicillin antibiotics since after that. Recommendations:  - GONZALEZ planned for 7/29/22   - I discussed with the patient regarding his allergies to penicillin of which she has no recall from the 1970s. I discussed with him that he has likely outgrown the allergy by this time and is in the correct setting to try simpler penicillins to see whether he still has allergies or not. If he does not have any allergies then it opens up several antibiotic options for him down in the future. - Continue vancomycin per pharmacy protocol for AUC/BEAR 400-600; patient does not want to give a trial to [facility]. ID will follow. Thank you for the opportunity to participate in the care of this patient. Please contact with questions or concerns.       Leatha Rodríguez MD  Infectious Diseases

## 2022-07-29 ENCOUNTER — APPOINTMENT (OUTPATIENT)
Dept: NON INVASIVE DIAGNOSTICS | Age: 72
DRG: 377 | End: 2022-07-29
Attending: NURSE PRACTITIONER
Payer: MEDICARE

## 2022-07-29 LAB
MAGNESIUM SERPL-MCNC: 2 MG/DL (ref 1.6–2.4)
PHOSPHATE SERPL-MCNC: 3.2 MG/DL (ref 2.6–4.7)

## 2022-07-29 PROCEDURE — 36415 COLL VENOUS BLD VENIPUNCTURE: CPT

## 2022-07-29 PROCEDURE — 74011000250 HC RX REV CODE- 250: Performed by: NURSE PRACTITIONER

## 2022-07-29 PROCEDURE — 74011000250 HC RX REV CODE- 250: Performed by: INTERNAL MEDICINE

## 2022-07-29 PROCEDURE — 36573 INSJ PICC RS&I 5 YR+: CPT | Performed by: STUDENT IN AN ORGANIZED HEALTH CARE EDUCATION/TRAINING PROGRAM

## 2022-07-29 PROCEDURE — 83735 ASSAY OF MAGNESIUM: CPT

## 2022-07-29 PROCEDURE — B24BZZ4 ULTRASONOGRAPHY OF HEART WITH AORTA, TRANSESOPHAGEAL: ICD-10-PCS | Performed by: INTERNAL MEDICINE

## 2022-07-29 PROCEDURE — 65270000046 HC RM TELEMETRY

## 2022-07-29 PROCEDURE — 74011250636 HC RX REV CODE- 250/636: Performed by: INTERNAL MEDICINE

## 2022-07-29 PROCEDURE — 74011250637 HC RX REV CODE- 250/637: Performed by: STUDENT IN AN ORGANIZED HEALTH CARE EDUCATION/TRAINING PROGRAM

## 2022-07-29 PROCEDURE — 74011250637 HC RX REV CODE- 250/637: Performed by: INTERNAL MEDICINE

## 2022-07-29 PROCEDURE — 99152 MOD SED SAME PHYS/QHP 5/>YRS: CPT

## 2022-07-29 PROCEDURE — 93312 ECHO TRANSESOPHAGEAL: CPT

## 2022-07-29 PROCEDURE — 84100 ASSAY OF PHOSPHORUS: CPT

## 2022-07-29 PROCEDURE — 74011250636 HC RX REV CODE- 250/636: Performed by: HOSPITALIST

## 2022-07-29 RX ORDER — FENTANYL CITRATE 50 UG/ML
12.5-5 INJECTION, SOLUTION INTRAMUSCULAR; INTRAVENOUS
Status: DISCONTINUED | OUTPATIENT
Start: 2022-07-29 | End: 2022-07-29

## 2022-07-29 RX ORDER — MIDAZOLAM HYDROCHLORIDE 1 MG/ML
.5-2 INJECTION, SOLUTION INTRAMUSCULAR; INTRAVENOUS
Status: DISCONTINUED | OUTPATIENT
Start: 2022-07-29 | End: 2022-07-29

## 2022-07-29 RX ORDER — LIDOCAINE HYDROCHLORIDE 20 MG/ML
15 SOLUTION OROPHARYNGEAL AS NEEDED
Status: DISCONTINUED | OUTPATIENT
Start: 2022-07-29 | End: 2022-07-29

## 2022-07-29 RX ADMIN — MIDAZOLAM HYDROCHLORIDE 0.5 MG: 1 INJECTION, SOLUTION INTRAMUSCULAR; INTRAVENOUS at 13:13

## 2022-07-29 RX ADMIN — SODIUM CHLORIDE, PRESERVATIVE FREE 10 ML: 5 INJECTION INTRAVENOUS at 16:18

## 2022-07-29 RX ADMIN — LIDOCAINE HYDROCHLORIDE 15 ML: 20 SOLUTION ORAL at 12:58

## 2022-07-29 RX ADMIN — ASPIRIN 81 MG: 81 TABLET, COATED ORAL at 08:31

## 2022-07-29 RX ADMIN — PANTOPRAZOLE SODIUM 40 MG: 40 TABLET, DELAYED RELEASE ORAL at 16:18

## 2022-07-29 RX ADMIN — PANTOPRAZOLE SODIUM 40 MG: 40 TABLET, DELAYED RELEASE ORAL at 08:31

## 2022-07-29 RX ADMIN — MIDAZOLAM HYDROCHLORIDE 0.5 MG: 1 INJECTION, SOLUTION INTRAMUSCULAR; INTRAVENOUS at 13:19

## 2022-07-29 RX ADMIN — VANCOMYCIN HYDROCHLORIDE 1000 MG: 1 INJECTION, POWDER, LYOPHILIZED, FOR SOLUTION INTRAVENOUS at 16:17

## 2022-07-29 RX ADMIN — SODIUM CHLORIDE, PRESERVATIVE FREE 10 ML: 5 INJECTION INTRAVENOUS at 21:37

## 2022-07-29 RX ADMIN — BENZOCAINE, BUTAMBEN, AND TETRACAINE HYDROCHLORIDE 1 SPRAY: .028; .004; .004 AEROSOL, SPRAY TOPICAL at 13:01

## 2022-07-29 RX ADMIN — SODIUM CHLORIDE, PRESERVATIVE FREE 10 ML: 5 INJECTION INTRAVENOUS at 06:46

## 2022-07-29 NOTE — PROGRESS NOTES
Hospitalist Progress Note    NAME: Shaka Bush   :  1950   MRN:  [id number]       Assessment / Plan:  Hemorrhagic shock POA  Acute blood loss anemia admit Hgb 7.3 --> 5.8  Probable upper GI bleed POA  Presented with weakness, black stool  Admitted to ICU  HgG dropped from 7.3 --> 5.8, last HgB at [facility] was 14.3 in 2022  Transient pressors, IVF  S/pa 2 units pRBCs, HgB now 8.0 range  CTA abdomen/pelvis in ED         No identified active extravasation        Ectopic right pelvic kidney with nonobstructing stones seen within both kidneys. IV PPI    EGD done which showed    Impression:  -- moderate focal gastritis, possibly a healing ulcer, without active bleeding present today, biopsied  -- proximal esophageal web, widely patent  -- no fareed bleeding nor additional source of anemia/melena seen   Recommendations:  -- PPI BID x 8 weeks  -- pathology Antral gastric mucosa with reactive gastropathy. There is no   significant inflammation and no intestinal metaplasia or epithelial   dysplasia present. Negative for Helicobacter pylori by routine H&E   stain  -- resume diet  -- serial H/H  Discussed with GI who is okay to restart aspirin   Will restart plavix tomorrow    Streptococcal mitis bacteremia  Blood culture came back positive showed a Streptococcus mitis/oralis   IV vancomycin  CXR no ASD or edema  UA  CTA abdomen/pelvis no with acute abnormalities  Await speciation  Follow up repeat BC  Echo done, showed possible posterior leaflet MV vegetation  GONZALEZ none today no evidence of endocarditis      Acute kidney injury POA Admit BUN/creat 96 and 1.97  Last baseline creat 0.87 in 2022  Suspect hypovolemia with the bleeding and shock  IVF, creatinine improved to 0.83  Holding losartan      Prior stroke POA.   Essential hypertension POA  Hyperlipidemia POA  Resume aspirin, continue to hold Plavix for 72 hours  Holding losartan with bleed, hypotension, STEFAN              Resume as needed     Body mass index is 18.74 kg/m². Code status: Full  Prophylaxis: SCD's  Recommended Disposition: Home w/Family      18.5 - 24.9 Normal weight / Body mass index is 18.26 kg/m². Estimated discharge date: July 29  Barriers:    Code status: Full  Prophylaxis: SCD's  Recommended Disposition:  PT, OT, RN     Subjective:     Chief Complaint / Reason for Physician Visit    Review of Systems:  Symptom Y/N Comments  Symptom Y/N Comments   Fever/Chills n   Chest Pain n    Poor Appetite    Edema     Cough    Abdominal Pain n    Sputum    Joint Pain     SOB/URBAN n   Pruritis/Rash     Nausea/vomit n   Tolerating PT/OT     Diarrhea    Tolerating Diet y    Constipation n   Other       Could NOT obtain due to:      Objective:     VITALS:   Last 24hrs VS reviewed since prior progress note.  Most recent are:  Patient Vitals for the past 24 hrs:   Temp Pulse Resp BP SpO2   07/29/22 1400 -- 68 16 (!) 120/57 93 %   07/29/22 1355 -- 70 17 119/61 94 %   07/29/22 1350 -- 66 16 (!) 110/53 97 %   07/29/22 1345 -- 71 18 125/61 94 %   07/29/22 1340 -- 70 17 127/69 95 %   07/29/22 1335 -- 70 19 120/72 95 %   07/29/22 1330 -- 68 17 133/60 94 %   07/29/22 1325 -- 83 9 136/66 100 %   07/29/22 1320 -- 77 17 (!) 148/60 97 %   07/29/22 1315 -- 74 20 (!) 147/69 100 %   07/29/22 1310 -- 73 14 (!) 143/54 100 %   07/29/22 1305 -- 80 15 125/84 100 %   07/29/22 1203 -- 71 15 133/76 --   07/29/22 1051 98.4 °F (36.9 °C) 79 15 (!) 139/56 100 %   07/29/22 0757 98.2 °F (36.8 °C) 81 16 (!) 122/54 99 %   07/29/22 0428 98.3 °F (36.8 °C) 89 17 (!) 117/59 98 %   07/28/22 2334 98.5 °F (36.9 °C) 83 16 (!) 105/54 98 %   07/28/22 1929 98.2 °F (36.8 °C) 78 16 (!) 115/56 97 %   07/28/22 1513 98.5 °F (36.9 °C) 76 16 (!) 112/59 99 %       Intake/Output Summary (Last 24 hours) at 7/29/2022 1403  Last data filed at 7/29/2022 1051  Gross per 24 hour   Intake 340 ml   Output 1100 ml   Net -760 ml        I had a face to face encounter and independently examined this patient on 7/29/2022, as outlined below:  PHYSICAL EXAM:  General: WD, WN. Alert, cooperative, no acute distress    EENT:  EOMI. Anicteric sclerae. MMM  Resp:  CTA bilaterally, no wheezing or rales. No accessory muscle use  CV:  Regular  rhythm,  No edema  GI:  Soft, Non distended, Non tender. +Bowel sounds  Neurologic:  Alert and oriented X 3, normal speech,   Psych:   Good insight. Not anxious nor agitated  Skin:  No rashes. No jaundice    Reviewed most current lab test results and cultures  YES  Reviewed most current radiology test results   YES  Review and summation of old records today    NO  Reviewed patient's current orders and MAR    YES  PMH/SH reviewed - no change compared to H&P  ________________________________________________________________________  Care Plan discussed with:    Comments   Patient x    Family      RN x    Care Manager     Consultant                        Multidiciplinary team rounds were held today with , nursing, pharmacist and clinical coordinator. Patient's plan of care was discussed; medications were reviewed and discharge planning was addressed. ________________________________________________________________________  Total NON critical care TIME:  40   Minutes    Total CRITICAL CARE TIME Spent:   Minutes non procedure based      Comments   >50% of visit spent in counseling and coordination of care     ________________________________________________________________________  Sherly Mann MD     Procedures: see electronic medical records for all procedures/Xrays and details which were not copied into this note but were reviewed prior to creation of Plan. LABS:  I reviewed today's most current labs and imaging studies.   Pertinent labs include:  Recent Labs     07/27/22  0535   WBC 12.3*   HGB 8.8*   HCT 26.9*        Recent Labs     07/29/22  0431 07/27/22  0535   NA  --  138   K  --  3.8   CL  --  106   CO2  --  24   GLU  --  90   BUN  --  12   CREA  -- 0.68*   CA  --  8.8   MG 2.0 1.8   PHOS 3.2 2.5*       Signed:  Olimpia Palm MD

## 2022-07-29 NOTE — PROGRESS NOTES
TRANSFER - OUT REPORT:    Verbal report given to Jovanni (name) on Migel Masterson being transferred to PCU (unit) for routine progression of care       Report consisted of patient's Situation, Background, Assessment and   Recommendations(SBAR). Information from the following report(s) Procedure Summary was reviewed with the receiving nurse. Opportunity for questions and clarification was provided.       Patient transported with:   Monitor  Tech

## 2022-07-29 NOTE — PROGRESS NOTES
Bedside shift change report given to Ronni soto RN  (oncoming nurse) by Arjun Clifton RN  (offgoing nurse). Report included the following information SBAR, Kardex, Intake/Output, MAR, Recent Results, and Cardiac Rhythm Sinus Tach  . End of Shift Note    Bedside shift change report given to Vanita Dong RN  (oncoming nurse) by Veda Lang (offgoing nurse). Report included the following information SBAR, Kardex, Intake/Output, Recent Results, and Cardiac Rhythm NSR    Shift worked:  1900-0730     Shift summary and any significant changes:     NPO at midnight      Concerns for physician to address:  None at this time      Zone phone for oncoming shift:          Activity:  Activity Level: Up with Assistance  Number times ambulated in hallways past shift: 0  Number of times OOB to chair past shift: 0    Cardiac:   Cardiac Monitoring: Yes      Cardiac Rhythm: Sinus Rhythm    Access:   Current line(s): PIV     Genitourinary:   Urinary status: voiding    Respiratory:   O2 Device: None (Room air)  Chronic home O2 use?: NO  Incentive spirometer at bedside: NO       GI:  Last Bowel Movement Date:  (PTA)  Current diet:  ADULT ORAL NUTRITION SUPPLEMENT Breakfast, Dinner; Low Calorie/High Protein  DIET NPO Sips of Water with Meds  Passing flatus: YES  Tolerating current diet: YES       Pain Management:   Patient states pain is manageable on current regimen: YES    Skin:  Joseph Score: 20  Interventions: turn team, speciality bed, float heels, increase time out of bed, and PT/OT consult    Patient Safety:  Fall Score:  Total Score: 3  Interventions: bed/chair alarm, assistive device (walker, cane, etc), gripper socks, and pt to call before getting OOB  High Fall Risk: Yes    Length of Stay:  Expected LOS: 4d 9h  Actual LOS: 6      Veda Lang

## 2022-07-29 NOTE — PROGRESS NOTES
[facility]  [address]  [phone]  [employer]    Brief Procedure Note    Patient: Lucia Velazquez MRN: [id number]  SSN: [id number]    YOB: 1950  Age: 67 y.o. Sex: male      Date of Procedure: 7/29/2022     Pre-procedure Diagnosis: endocarditis    Post-procedure Diagnosis: bacteremia    Procedure: GONZALEZ    Performed By: Joanie Whitney MD     Anesthesia: Moderate Sedation    Estimated Blood Loss: Less than 10 mL      Specimens:  None    Findings: LVEF 50-55%, calcified AV, no stenosis. No endocarditis.      Complications: None    Implants: None    Recommendations: Continue medical therapy per primary team.    Signed By: Joanie Whitney MD     July 29, 2022

## 2022-07-29 NOTE — PROGRESS NOTES
Patient scheduled for GONZALEZ this afternoon at 1 PM with Dr. Ramón Paris. I have discussed the procedure, including the risks, benefits and alternatives. Risks including, but not limited to, death, cardiac tamponade, arrhythmia, renal failure, infection, bleeding, and stroke. He does desire to proceed.     9 Zulema Thornton NP  7/29/2022  9:39 AM

## 2022-07-29 NOTE — PROGRESS NOTES
Transition of Care Plan:     RUR: 11%  Disposition: Home with Home Health and IV abx. Follow up appointments: PCP  DME needed: None  Transportation at Discharge: Pt's wife will transport at d/c.   Isaac Zaragoza or means to access home:  Pt has access      IM Medicare Letter: Given 7/29/2022  Is patient a BCPI-A Bundle:   N/A                   If yes, was Bundle Letter given?: N/A   Is patient a [place] and connected with the [place]? If yes, was [employer] transfer form completed and VA notified? No  Caregiver Contact: Priyanka Cox- Wife [phone]  Discharge Caregiver contacted prior to discharge? CM will notify caregiver at d/c. Care Conference needed?:  No    5:05pm-CM sent referral to [facility] via [employer]. 5:00pm- CM met with pt at bedside to discuss d/c plan. CM informed pt that he will need HH and IV abx. CM reviewed with pt the [facility] list. Pt selected [facility]. Pt signed [address] document and FOC document was placed pt's bedside chart. Referral sent to [facility] via [employer]. CM received and signed 2nd IM letter. CM will continue to follow patient for discharge planning needs and arrange for services as deemed necessary.     Najma Diggs, [facility]  [phone]

## 2022-07-29 NOTE — PROGRESS NOTES
Patient arrived to Non-Invasive Cardiology Lab for In Patient GONZALEZ Procedure. Staff introduced to patient. Patient identifiers verified with Name and Date of Birth. Procedure verified with patient. Consent forms reviewed and signed by patient or authorized representative and verified. Allergies verified. Patient informed of procedure and plan of care. Questions answered with review. Patient on cardiac monitor, non-invasive blood pressure, SPO2 monitor. On RA. Patient is A&Ox3. Patient reports no complaints. Patient on stretcher, in low position, with side rails up. Patient instructed to call for assistance as needed. Family in waiting room.

## 2022-07-29 NOTE — PROGRESS NOTES
68 yo M admitted for weakness.     -Strep mitis/paralysis bacteremia. Possible mitral valve endocarditis per TTE.     -Acute anemia present on arrival.       -Patient reported allergies to penicillins when he was in the [employer] in the 1970s. Patient has absolutely no recall of what the reaction was and whether or not he has taken any penicillin antibiotics since after that. - GONZALEZ 7/29/22 showing no endocarditis. Final abx recommendations:  - Vancomycin 1250mg q24h  - Duration 2 weeks, 7/24/22 to 8/7/22.   - No ID follow up needed in clinic after discharge    Please have labs done on weekly basis for CBC with diff, BMP and have results sent to me by faxing to  [phone]. Call with critical labs at [phone]. If patient is on Vancomycin IV, please check Vancomycin trough send results to me by faxing to [phone]. For IV Vancomycin, trough goal is 15-20. Please ensure that patient has PICC care arranged per protocol   Once IV antibiotics have been discontinued, please ensure that PICC/IV access is promptly removed. Smoking cessation encouraged if patient is current smoker to aid in infection and wound healing      Orders placed for CM. ID will sign off now. Please recall as needed. Thank you for the opportunity to participate in the care of this patient. Please contact with questions or concerns.       Luis M Cantu MD  Infectious Diseases

## 2022-07-30 VITALS
HEIGHT: 74 IN | BODY MASS INDEX: 18.08 KG/M2 | RESPIRATION RATE: 16 BRPM | OXYGEN SATURATION: 97 % | WEIGHT: 140.87 LBS | SYSTOLIC BLOOD PRESSURE: 122 MMHG | HEART RATE: 78 BPM | DIASTOLIC BLOOD PRESSURE: 91 MMHG | TEMPERATURE: 97.6 F

## 2022-07-30 LAB
ANION GAP SERPL CALC-SCNC: 6 MMOL/L (ref 5–15)
BACTERIA SPEC CULT: NORMAL
BUN SERPL-MCNC: 10 MG/DL (ref 6–20)
BUN/CREAT SERPL: 14 (ref 12–20)
CALCIUM SERPL-MCNC: 9 MG/DL (ref 8.5–10.1)
CHLORIDE SERPL-SCNC: 105 MMOL/L (ref 97–108)
CK SERPL-CCNC: 33 U/L (ref 39–308)
CO2 SERPL-SCNC: 26 MMOL/L (ref 21–32)
CREAT SERPL-MCNC: 0.72 MG/DL (ref 0.7–1.3)
DATE LAST DOSE: ABNORMAL
GLUCOSE SERPL-MCNC: 89 MG/DL (ref 65–100)
MAGNESIUM SERPL-MCNC: 2 MG/DL (ref 1.6–2.4)
PHOSPHATE SERPL-MCNC: 3.3 MG/DL (ref 2.6–4.7)
POTASSIUM SERPL-SCNC: 4.1 MMOL/L (ref 3.5–5.1)
REPORTED DOSE,DOSE: ABNORMAL UNITS
REPORTED DOSE/TIME,TMG: ABNORMAL
SERVICE CMNT-IMP: NORMAL
SODIUM SERPL-SCNC: 137 MMOL/L (ref 136–145)
VANCOMYCIN TROUGH SERPL-MCNC: 10.8 UG/ML (ref 5–10)

## 2022-07-30 PROCEDURE — 76937 US GUIDE VASCULAR ACCESS: CPT

## 2022-07-30 PROCEDURE — 83735 ASSAY OF MAGNESIUM: CPT

## 2022-07-30 PROCEDURE — 82550 ASSAY OF CK (CPK): CPT

## 2022-07-30 PROCEDURE — 77030018786 HC NDL GD F/USND BARD -B

## 2022-07-30 PROCEDURE — 36415 COLL VENOUS BLD VENIPUNCTURE: CPT

## 2022-07-30 PROCEDURE — 74011250637 HC RX REV CODE- 250/637: Performed by: STUDENT IN AN ORGANIZED HEALTH CARE EDUCATION/TRAINING PROGRAM

## 2022-07-30 PROCEDURE — 74011000250 HC RX REV CODE- 250: Performed by: INTERNAL MEDICINE

## 2022-07-30 PROCEDURE — 74011250637 HC RX REV CODE- 250/637: Performed by: INTERNAL MEDICINE

## 2022-07-30 PROCEDURE — 84100 ASSAY OF PHOSPHORUS: CPT

## 2022-07-30 PROCEDURE — 80202 ASSAY OF VANCOMYCIN: CPT

## 2022-07-30 PROCEDURE — 80048 BASIC METABOLIC PNL TOTAL CA: CPT

## 2022-07-30 PROCEDURE — C1751 CATH, INF, PER/CENT/MIDLINE: HCPCS

## 2022-07-30 PROCEDURE — 74011250636 HC RX REV CODE- 250/636: Performed by: HOSPITALIST

## 2022-07-30 RX ORDER — VANCOMYCIN HCL IN 5 % DEXTROSE 1.25 G/25
1250 PLASTIC BAG, INJECTION (ML) INTRAVENOUS EVERY 24 HOURS
Qty: 2000 ML | Refills: 0 | Status: SHIPPED | OUTPATIENT
Start: 2022-07-30 | End: 2022-08-07

## 2022-07-30 RX ORDER — HEPARIN 100 UNIT/ML
300 SYRINGE INTRAVENOUS AS NEEDED
Status: DISCONTINUED | OUTPATIENT
Start: 2022-07-30 | End: 2022-07-30 | Stop reason: HOSPADM

## 2022-07-30 RX ADMIN — VANCOMYCIN HYDROCHLORIDE 1000 MG: 1 INJECTION, POWDER, LYOPHILIZED, FOR SOLUTION INTRAVENOUS at 09:00

## 2022-07-30 RX ADMIN — ASPIRIN 81 MG: 81 TABLET, COATED ORAL at 09:00

## 2022-07-30 RX ADMIN — PANTOPRAZOLE SODIUM 40 MG: 40 TABLET, DELAYED RELEASE ORAL at 09:01

## 2022-07-30 RX ADMIN — SODIUM CHLORIDE, PRESERVATIVE FREE 10 ML: 5 INJECTION INTRAVENOUS at 06:10

## 2022-07-30 NOTE — PROGRESS NOTES
Transition of Care Plan:    RUR: 9%  Disposition: Home with IV antibiotics. [facility] has accepted the patient for home IV antibitoics and [facility] in [place], [place] has accepted the patient for SN services   Follow up appointments: To be scheduled prior to d/c   DME needed: N/A  Transportation at Discharge: The patient's wife will be transporting him home   [place] or means to access home: Yes      IM Medicare Letter: Signed & given on 7/29/22  Is patient a BCPI-A Bundle: N/A         If yes, was Bundle Letter given?:    Is patient a  and connected with the [place]? N/A               If yes, was [employer] transfer form completed and VA notified? Caregiver Contact: Morgan Rodriguez, Wife, Cell Phone: [phone], Home Phone: [phone]  Discharge Caregiver contacted prior to discharge? Yes, CM met with the patient's wife at [address] [place] [address] needed?: No    CM spoke with [facility] liaison, Garth Martínez (cell phone: [phone]), and she stated that they can accept the patient for IV antibiotics. CM confirmed with the patient and his wife that they are able to afford the $57.86 co-pay for the medications. CM also provided Garth Martínez with the patient's physical address: [address] [place] [address]. [address]. CM faxed the patient's referral to [facility] (fax: [phone]) and spoke with [address] (phone: [phone]) and confirmed that they are able to accept the patient for [facility] SN services for IV antibiotics. CM sent the patient's PICC line report to both [facility] and [facility]. CM also faxed [facility] the patient's completed Face to Face form signed by the patient's MD.     The patient's 2nd IM letter was given & signed on 7/29/22. The patient's wife is at bedside and will be transporting the patient home. From CM perspective, the patient can be discharged.      Tristan Lovelace 169, R Maria De Jesus Goode 75

## 2022-07-30 NOTE — PROGRESS NOTES
PICC (Peripherally Inserted Central Catheter) line insertion  procedure note :     Procedure explained to patient along with risks and benefits  and patient agreed to proceed. Informed  written consent obtained from  patient. Patient teaching completed. Timeout completed. Pre-procedure assessment done. Maximum sterile barrier precautions observed throughout procedure. Lidocaine 1% 3  ml sq given prior to cannulation. Cannulated brachial  vein using ultrasound guidance and modified seldinger technique. Inserted 4  Arabic single  lumen PICC to right upper arm using Big Stage Tip Location System and  [place] 1898. Pt has    sinus   rhythm. PICC tip location was confirmed by 3 NXTM   tip positioning system, indicating tall P wave and no negative deflection before P wave which would indicate that the PICC tip is properly placed in the distal SVC or at the [place]. PICC tip location was  confirmed by 2 PICC nurses and printout placed on patient's chart. Blood return verified and flushed with 20 ml normal saline in each port. Sterile dressing applied with biopatch, statLock and occlusive dressing as per protocol. Curos caps applied to each port. Patient tolerated procedure well with minimal blood loss ( less than 5 ml.)  PICC procedure performed by  :  Radha Buckley RN PICC Nurse. Vascular Access Team.   Assisted by :  Taina Ray RN  [address] [place] [address] Dr Nurse, Vascular Access Team.  Reason for access :  Long term antibiotics (Vancomycin) until 6/8/89   Complications related to insertion  : none  X-Ray : not applicable  Notified primary nurse  Sherrill CHRISTOPHER  that  PICC line can be used. Trimmed Length :  44   cm   External Length : At the Hub   PICC line site arm circumference:    28    cm   PICC catheter occupies   9   % of vein  Type of PICC: Bard Solo Power PICC    Ref # :    [id number]     Lot # :  [id number]  Expiration Date :  2023/09/30    Radha Buckley RN.  PICC Nurse, Vascular Access Team.

## 2022-07-30 NOTE — DISCHARGE SUMMARY
Hospitalist Discharge Summary     Patient ID:  Wyatt Casanova  [id number]  64 y.o.  1950 7/22/2022    PCP on record: Mary Brito MD    Admit date: 7/22/2022  Discharge date and time: 7/31/2022    DISCHARGE DIAGNOS  Hemorrhagic shock  GI bleed  STEFAN  HTN  Acute blood loss anemia  Patient Active Problem List   Diagnosis Code    Acute blood loss anemia D62    Upper GI bleed K92.2    Sepsis (HCC) A41.9      CONSULTATIONS:  IP CONSULT TO GASTROENTEROLOGY  IP CONSULT TO INFECTIOUS DISEASES  IP CONSULT TO CARDIOLOGY    Excerpted HPI from H&P of Damian Rizvi MD:    Mr. Иван Cole is a 63-year-old  male with history of stroke, on aspirin and Plavix for secondary prevention, hypertension and hyperlipidemia. He was brought to emergency department with complaint of worsening generalized weakness and fatigue. He went to a trip [place] the [place] to [place] and on the way back he developed extreme fatigue and also noticed dark-colored stool. On lab work he was found to have hemoglobin of 7.3 and later dropped down to 5.8. He was also hypotensive required low-dose vasopressors. He is currently on 1 ping of Levophed. He has received 1 unit of blood. He is admitted to ICU for further care and monitoring. He now reports feeling a little better. He denies any chest pain, shortness of breath, fever, recent change in urinary habits, denies abdominal pain, leg swelling, leg pain or edema.  ______________________________________________________________________  DISCHARGE SUMMARY/HOSPITAL COURSE:  for full details see H&P, daily progress notes, labs, consult notes.      68years old male admitted to the ICU for hemorrhagic shock due to upper GI bleed patient received 2 unit of hemoglobin when he was on PPI EGD was done which is soft showed gastritis pathology showed reactive gastropathy no significant inflammation or metaplasia, globin stabilized, patient also he had bacteremia which he received vancomycin for that, and GONZALEZ did not show any signs of endocarditis, STEFAN improved patient recommended to follow his PCP and GI outpatient      Hemorrhagic shock POA  Acute blood loss anemia admit Hgb 7.3 --> 5.8  Probable upper GI bleed POA  Presented with weakness, black stool  Admitted to ICU  HgG dropped from 7.3 --> 5.8, last HgB at [facility] was 14.3 in Jan 2022  Transient pressors, IVF  S/pa 2 units pRBCs, HgB now 8.0 range  CTA abdomen/pelvis in ED         No identified active extravasation        Ectopic right pelvic kidney with nonobstructing stones seen within both kidneys. IV PPI     EGD done which showed   Impression:  -- moderate focal gastritis, possibly a healing ulcer, without active bleeding present today, biopsied  -- proximal esophageal web, widely patent  -- no fareed bleeding nor additional source of anemia/melena seen   Recommendations:  -- PPI BID x 8 weeks  -- pathology Antral gastric mucosa with reactive gastropathy. There is no   significant inflammation and no intestinal metaplasia or epithelial   dysplasia present. Negative for Helicobacter pylori by routine H&E   stain  -- resume diet  -- serial H/H  Discussed with GI who is okay to restart aspirin   Will restart plavix tomorrow     Streptococcal mitis bacteremia  Blood culture came back positive showed a Streptococcus mitis/oralis  IV vancomycin  CXR no ASD or edema  UA  CTA abdomen/pelvis no with acute abnormalities  Await speciation  Follow up repeat BC  Echo done, showed possible posterior leaflet MV vegetation  GONZALEZ none today no evidence of endocarditis       Acute kidney injury POA Admit BUN/creat 96 and 1.97  Last baseline creat 0.87 in March 2022  Suspect hypovolemia with the bleeding and shock  IVF, creatinine improved to 0.83  Holding losartan      Prior stroke POA.   Essential hypertension POA  Hyperlipidemia POA  Resume aspirin, continue to hold Plavix for 72 hours  Holding losartan with bleed, hypotension, STEFAN              Resume as needed     Body mass index is 18.74 kg/m².    _______________________________________________________________________  Patient seen and examined by me on discharge day. Pertinent Findings:  Gen:    Not in distress  Chest: Clear lungs  CVS:   Regular rhythm. No edema  Abd:  Soft, not distended, not tender  Neuro:  Alert,   _______________________________________________________________________  DISCHARGE MEDICATIONS:   Discharge Medication List as of 7/30/2022  2:58 PM        START taking these medications    Details   vancomycin HCl in 5 % dextrose (vancomycin in D5W) 1.25 gram/250 mL soln 250 mL by IntraVENous route every twenty-four (24) hours for 8 days. , Normal, Disp-2000 mL, R-0           CONTINUE these medications which have NOT CHANGED    Details   coenzyme q10 10 mg cap Take  by mouth., Historical Med      atorvastatin (LIPITOR) 40 mg tablet TAKE 1 TABLET BY MOUTH DAILY. REPEAT FASTING LABS 3-2-2022 BEFORE FURTHER REFILLS, Normal, Disp-90 Tablet, R-3**Patient requests 90 days supply**      clopidogreL (PLAVIX) 75 mg tab Take 1 Tablet by mouth daily. , Normal, Disp-90 Tablet, R-2      losartan (COZAAR) 25 mg tablet Take 1 Tablet by mouth daily. , Normal, Disp-90 Tablet, R-2      aspirin delayed-release 81 mg tablet Take 81 mg by mouth daily. , Historical Med      cyanocobalamin 1,000 mcg tablet Take 1,000 mcg by mouth., Historical Med               Patient Follow Up Instructions: Activity: Activity as tolerated  Diet: Regular Diet  Wound Care: None needed    Follow-up with  in 1 week.   Follow-up tests/labs     Follow-up Information       Follow up With Specialties Details Why Contact Info    Noreen Beck [address]  [phone]            ________________________________________________________________    Risk of deterioration: Moderate    Condition at Discharge: Stable  __________________________________________________________________    Disposition  Home with family, no needs    ____________________________________________________________________    Code Status: Full Code  ___________________________________________________________________      Total time in minutes spent coordinating this discharge (includes going over instructions, follow-up, prescriptions, and preparing report for sign off to her PCP) :  >30 minutes    Signed:   Maria Antonia Llamas MD

## 2022-07-30 NOTE — PROGRESS NOTES
End of Shift Note    Bedside shift change report given to Sherrill (oncoming nurse) by Catalina Kirby RN (offgoing nurse). Report included the following information SBAR, Kardex, Intake/Output, MAR, and Recent Results    Shift worked:  5970-0783     Shift summary and any significant changes:     No significant changes. Pt rested well overnight. Is awaiting PICC placement for long term abx. No concerns.      Concerns for physician to address:  none     Zone phone for oncoming shift:              Catalina Kirby RN

## 2022-07-30 NOTE — PROGRESS NOTES
12: 28PM Inbound F/U call from Miguel Angel Cornell ([employer]). Update regarding out of pocket cost.  The correct oop cost to patient is $57.86/per day. Miguel Angel Cornell will call the patient to inform of correct pricing. Tracee Spencer, MELVIN            12:10PM Inbound call from Miguel Angel Cornell ([employer]). Informed his out of pocket cost is $70.59/per day. Medicare is primary an  is secondary. Patient has to meet $3500/yearly deductible then he'll be covered 100%. Miguel Angel Cornell, further states she will call the patient to inform of his out of pocket costs.           Jorgeannettekelle Blanco, MELVIN

## 2022-07-30 NOTE — PROGRESS NOTES
Problem: Pressure Injury - Risk of  Goal: *Prevention of pressure injury  Description: Document Joseph Scale and appropriate interventions in the flowsheet. Outcome: Progressing Towards Goal  Note: Pressure Injury Interventions:  Sensory Interventions: Assess changes in LOC, Assess need for specialty bed, Avoid rigorous massage over bony prominences, Float heels, Keep linens dry and wrinkle-free         Activity Interventions: Increase time out of bed, Pressure redistribution bed/mattress(bed type)    Mobility Interventions: HOB 30 degrees or less, PT/OT evaluation, Turn and reposition approx. every two hours(pillow and wedges)    Nutrition Interventions: Document food/fluid/supplement intake    Friction and Shear Interventions: Apply protective barrier, creams and emollients, Lift team/patient mobility team, HOB 30 degrees or less                Problem: Falls - Risk of  Goal: *Absence of Falls  Description: Document Rubin Fall Risk and appropriate interventions in the flowsheet.   Outcome: Progressing Towards Goal  Note: Fall Risk Interventions:  Mobility Interventions: Bed/chair exit alarm, Communicate number of staff needed for ambulation/transfer, Patient to call before getting OOB    Mentation Interventions: Bed/chair exit alarm, Increase mobility    Medication Interventions: Bed/chair exit alarm, Evaluate medications/consider consulting pharmacy, Patient to call before getting OOB    Elimination Interventions: Call light in reach, Patient to call for help with toileting needs, Toileting schedule/hourly rounds    History of Falls Interventions: Bed/chair exit alarm, Door open when patient unattended, Room close to nurse's station

## 2022-07-31 PROCEDURE — 93312 ECHO TRANSESOPHAGEAL: CPT | Performed by: INTERNAL MEDICINE

## 2022-07-31 PROCEDURE — 93325 DOPPLER ECHO COLOR FLOW MAPG: CPT | Performed by: INTERNAL MEDICINE

## 2022-08-02 ENCOUNTER — TELEPHONE (OUTPATIENT)
Dept: FAMILY MEDICINE CLINIC | Age: 72
End: 2022-08-02

## 2022-08-02 NOTE — TELEPHONE ENCOUNTER
Cristela Salvador from [facility] infusion     Questioning length of treatment     Pt thinks it ends 8/7/22      Pls advise     [phone]

## 2022-08-03 ENCOUNTER — TELEPHONE (OUTPATIENT)
Dept: INFECTIOUS DISEASES | Age: 72
End: 2022-08-03

## 2022-08-03 NOTE — TELEPHONE ENCOUNTER
Yes, it ends on 8/7/22. Please call and let them know.        Thanks,       Romulo Wright MD  Infectious Diseases

## 2022-08-03 NOTE — TELEPHONE ENCOUNTER
Scott Rincon from [facility] infusion     Questioning length of treatment     Pt thinks it ends 8/7/22   That is the end date reported to Scott Rincon

## 2022-09-25 ENCOUNTER — HOSPITAL ENCOUNTER (INPATIENT)
Age: 72
LOS: 2 days | Discharge: HOME | DRG: 377 | End: 2022-09-27
Attending: STUDENT IN AN ORGANIZED HEALTH CARE EDUCATION/TRAINING PROGRAM | Admitting: HOSPITALIST
Payer: MEDICARE

## 2022-09-25 ENCOUNTER — APPOINTMENT (OUTPATIENT)
Dept: GENERAL RADIOLOGY | Age: 72
DRG: 377 | End: 2022-09-25
Attending: STUDENT IN AN ORGANIZED HEALTH CARE EDUCATION/TRAINING PROGRAM
Payer: MEDICARE

## 2022-09-25 DIAGNOSIS — K92.2 GASTROINTESTINAL HEMORRHAGE, UNSPECIFIED GASTROINTESTINAL HEMORRHAGE TYPE: Primary | ICD-10-CM

## 2022-09-25 DIAGNOSIS — D64.9 ANEMIA, UNSPECIFIED TYPE: ICD-10-CM

## 2022-09-25 PROBLEM — I95.9 HYPOTENSION: Status: ACTIVE | Noted: 2022-09-25

## 2022-09-25 LAB
ALBUMIN SERPL-MCNC: 2.8 G/DL (ref 3.5–5)
ALBUMIN/GLOB SERPL: 0.7 {RATIO} (ref 1.1–2.2)
ALP SERPL-CCNC: 59 U/L (ref 45–117)
ALT SERPL-CCNC: 21 U/L (ref 12–78)
ANION GAP SERPL CALC-SCNC: 9 MMOL/L (ref 5–15)
APPEARANCE UR: CLEAR
AST SERPL-CCNC: 11 U/L (ref 15–37)
BACTERIA URNS QL MICRO: NEGATIVE /HPF
BASOPHILS # BLD: 0.1 K/UL (ref 0–0.1)
BASOPHILS NFR BLD: 1 % (ref 0–1)
BILIRUB SERPL-MCNC: 0.5 MG/DL (ref 0.2–1)
BILIRUB UR QL: NEGATIVE
BUN SERPL-MCNC: 41 MG/DL (ref 6–20)
BUN/CREAT SERPL: 38 (ref 12–20)
CALCIUM SERPL-MCNC: 8.9 MG/DL (ref 8.5–10.1)
CHLORIDE SERPL-SCNC: 106 MMOL/L (ref 97–108)
CO2 SERPL-SCNC: 23 MMOL/L (ref 21–32)
COLOR UR: ABNORMAL
CREAT SERPL-MCNC: 1.08 MG/DL (ref 0.7–1.3)
DIFFERENTIAL METHOD BLD: ABNORMAL
EOSINOPHIL # BLD: 0 K/UL (ref 0–0.4)
EOSINOPHIL NFR BLD: 0 % (ref 0–7)
EPITH CASTS URNS QL MICRO: ABNORMAL /LPF
ERYTHROCYTE [DISTWIDTH] IN BLOOD BY AUTOMATED COUNT: 19.2 % (ref 11.5–14.5)
GLOBULIN SER CALC-MCNC: 3.9 G/DL (ref 2–4)
GLUCOSE SERPL-MCNC: 133 MG/DL (ref 65–100)
GLUCOSE UR STRIP.AUTO-MCNC: NEGATIVE MG/DL
HCT VFR BLD AUTO: 18.7 % (ref 36.6–50.3)
HCT VFR BLD AUTO: 24.7 % (ref 36.6–50.3)
HEMOCCULT STL QL: NEGATIVE
HGB BLD-MCNC: 5.7 G/DL (ref 12.1–17)
HGB BLD-MCNC: 7.4 G/DL (ref 12.1–17)
HGB UR QL STRIP: NEGATIVE
HISTORY CHECKED?,CKHIST: NORMAL
HYALINE CASTS URNS QL MICRO: ABNORMAL /LPF (ref 0–2)
IMM GRANULOCYTES # BLD AUTO: 0.1 K/UL (ref 0–0.04)
IMM GRANULOCYTES NFR BLD AUTO: 1 % (ref 0–0.5)
INR PPP: 1.1 (ref 0.9–1.1)
KETONES UR QL STRIP.AUTO: ABNORMAL MG/DL
LACTATE BLD-SCNC: 1.72 MMOL/L (ref 0.4–2)
LACTATE BLD-SCNC: 2.18 MMOL/L (ref 0.4–2)
LEUKOCYTE ESTERASE UR QL STRIP.AUTO: NEGATIVE
LYMPHOCYTES # BLD: 1 K/UL (ref 0.8–3.5)
LYMPHOCYTES NFR BLD: 7 % (ref 12–49)
MCH RBC QN AUTO: 23.1 PG (ref 26–34)
MCHC RBC AUTO-ENTMCNC: 30 G/DL (ref 30–36.5)
MCV RBC AUTO: 77.2 FL (ref 80–99)
MONOCYTES # BLD: 0.6 K/UL (ref 0–1)
MONOCYTES NFR BLD: 5 % (ref 5–13)
NEUTS SEG # BLD: 12.1 K/UL (ref 1.8–8)
NEUTS SEG NFR BLD: 86 % (ref 32–75)
NITRITE UR QL STRIP.AUTO: NEGATIVE
NRBC # BLD: 0 K/UL (ref 0–0.01)
NRBC BLD-RTO: 0 PER 100 WBC
PH UR STRIP: 6 [PH] (ref 5–8)
PLATELET # BLD AUTO: 560 K/UL (ref 150–400)
PMV BLD AUTO: 8.9 FL (ref 8.9–12.9)
POTASSIUM SERPL-SCNC: 4.4 MMOL/L (ref 3.5–5.1)
PROT SERPL-MCNC: 6.7 G/DL (ref 6.4–8.2)
PROT UR STRIP-MCNC: NEGATIVE MG/DL
PROTHROMBIN TIME: 11.2 SEC (ref 9–11.1)
RBC # BLD AUTO: 3.2 M/UL (ref 4.1–5.7)
RBC #/AREA URNS HPF: ABNORMAL /HPF (ref 0–5)
SODIUM SERPL-SCNC: 138 MMOL/L (ref 136–145)
SP GR UR REFRACTOMETRY: 1.02
TROPONIN-HIGH SENSITIVITY: 27 NG/L (ref 0–76)
UA: UC IF INDICATED,UAUC: ABNORMAL
UROBILINOGEN UR QL STRIP.AUTO: 1 EU/DL (ref 0.2–1)
WBC # BLD AUTO: 13.9 K/UL (ref 4.1–11.1)
WBC URNS QL MICRO: ABNORMAL /HPF (ref 0–4)

## 2022-09-25 PROCEDURE — 74011250636 HC RX REV CODE- 250/636: Performed by: HOSPITALIST

## 2022-09-25 PROCEDURE — 74011000250 HC RX REV CODE- 250: Performed by: STUDENT IN AN ORGANIZED HEALTH CARE EDUCATION/TRAINING PROGRAM

## 2022-09-25 PROCEDURE — 65270000046 HC RM TELEMETRY

## 2022-09-25 PROCEDURE — 84484 ASSAY OF TROPONIN QUANT: CPT

## 2022-09-25 PROCEDURE — 74011250637 HC RX REV CODE- 250/637: Performed by: HOSPITALIST

## 2022-09-25 PROCEDURE — 85018 HEMOGLOBIN: CPT

## 2022-09-25 PROCEDURE — 80053 COMPREHEN METABOLIC PANEL: CPT

## 2022-09-25 PROCEDURE — 74011000250 HC RX REV CODE- 250: Performed by: HOSPITALIST

## 2022-09-25 PROCEDURE — 96361 HYDRATE IV INFUSION ADD-ON: CPT

## 2022-09-25 PROCEDURE — 96374 THER/PROPH/DIAG INJ IV PUSH: CPT

## 2022-09-25 PROCEDURE — 87040 BLOOD CULTURE FOR BACTERIA: CPT

## 2022-09-25 PROCEDURE — C9113 INJ PANTOPRAZOLE SODIUM, VIA: HCPCS | Performed by: STUDENT IN AN ORGANIZED HEALTH CARE EDUCATION/TRAINING PROGRAM

## 2022-09-25 PROCEDURE — 74011250636 HC RX REV CODE- 250/636: Performed by: STUDENT IN AN ORGANIZED HEALTH CARE EDUCATION/TRAINING PROGRAM

## 2022-09-25 PROCEDURE — 82272 OCCULT BLD FECES 1-3 TESTS: CPT

## 2022-09-25 PROCEDURE — 93005 ELECTROCARDIOGRAM TRACING: CPT

## 2022-09-25 PROCEDURE — 96360 HYDRATION IV INFUSION INIT: CPT

## 2022-09-25 PROCEDURE — 86923 COMPATIBILITY TEST ELECTRIC: CPT

## 2022-09-25 PROCEDURE — 81001 URINALYSIS AUTO W/SCOPE: CPT

## 2022-09-25 PROCEDURE — 86900 BLOOD TYPING SEROLOGIC ABO: CPT

## 2022-09-25 PROCEDURE — 85610 PROTHROMBIN TIME: CPT

## 2022-09-25 PROCEDURE — 36415 COLL VENOUS BLD VENIPUNCTURE: CPT

## 2022-09-25 PROCEDURE — 74011000258 HC RX REV CODE- 258: Performed by: HOSPITALIST

## 2022-09-25 PROCEDURE — 99285 EMERGENCY DEPT VISIT HI MDM: CPT

## 2022-09-25 PROCEDURE — 83605 ASSAY OF LACTIC ACID: CPT

## 2022-09-25 PROCEDURE — C9113 INJ PANTOPRAZOLE SODIUM, VIA: HCPCS | Performed by: HOSPITALIST

## 2022-09-25 PROCEDURE — 85025 COMPLETE CBC W/AUTO DIFF WBC: CPT

## 2022-09-25 PROCEDURE — 71045 X-RAY EXAM CHEST 1 VIEW: CPT

## 2022-09-25 RX ORDER — ATORVASTATIN CALCIUM 40 MG/1
40 TABLET, FILM COATED ORAL
Status: DISCONTINUED | OUTPATIENT
Start: 2022-09-25 | End: 2022-09-27 | Stop reason: HOSPADM

## 2022-09-25 RX ORDER — ACETAMINOPHEN 325 MG/1
650 TABLET ORAL
Status: DISCONTINUED | OUTPATIENT
Start: 2022-09-25 | End: 2022-09-27 | Stop reason: HOSPADM

## 2022-09-25 RX ORDER — SODIUM CHLORIDE 9 MG/ML
100 INJECTION, SOLUTION INTRAVENOUS CONTINUOUS
Status: DISCONTINUED | OUTPATIENT
Start: 2022-09-25 | End: 2022-09-27 | Stop reason: HOSPADM

## 2022-09-25 RX ORDER — SODIUM CHLORIDE 0.9 % (FLUSH) 0.9 %
5-40 SYRINGE (ML) INJECTION AS NEEDED
Status: DISCONTINUED | OUTPATIENT
Start: 2022-09-25 | End: 2022-09-27 | Stop reason: HOSPADM

## 2022-09-25 RX ORDER — SODIUM CHLORIDE 9 MG/ML
250 INJECTION, SOLUTION INTRAVENOUS AS NEEDED
Status: DISCONTINUED | OUTPATIENT
Start: 2022-09-25 | End: 2022-09-26

## 2022-09-25 RX ORDER — SODIUM CHLORIDE 0.9 % (FLUSH) 0.9 %
5-10 SYRINGE (ML) INJECTION AS NEEDED
Status: DISCONTINUED | OUTPATIENT
Start: 2022-09-25 | End: 2022-09-26

## 2022-09-25 RX ORDER — SODIUM CHLORIDE 0.9 % (FLUSH) 0.9 %
5-40 SYRINGE (ML) INJECTION EVERY 8 HOURS
Status: DISCONTINUED | OUTPATIENT
Start: 2022-09-25 | End: 2022-09-27 | Stop reason: HOSPADM

## 2022-09-25 RX ORDER — MULTIVIT WITH MINERALS/HERBS
1 TABLET ORAL
COMMUNITY

## 2022-09-25 RX ORDER — ONDANSETRON 2 MG/ML
4 INJECTION INTRAMUSCULAR; INTRAVENOUS
Status: DISCONTINUED | OUTPATIENT
Start: 2022-09-25 | End: 2022-09-27 | Stop reason: HOSPADM

## 2022-09-25 RX ORDER — ACETAMINOPHEN 650 MG/1
650 SUPPOSITORY RECTAL
Status: DISCONTINUED | OUTPATIENT
Start: 2022-09-25 | End: 2022-09-27 | Stop reason: HOSPADM

## 2022-09-25 RX ORDER — ONDANSETRON 4 MG/1
4 TABLET, ORALLY DISINTEGRATING ORAL
Status: DISCONTINUED | OUTPATIENT
Start: 2022-09-25 | End: 2022-09-27 | Stop reason: HOSPADM

## 2022-09-25 RX ORDER — POLYETHYLENE GLYCOL 3350 17 G/17G
17 POWDER, FOR SOLUTION ORAL DAILY PRN
Status: DISCONTINUED | OUTPATIENT
Start: 2022-09-25 | End: 2022-09-27 | Stop reason: HOSPADM

## 2022-09-25 RX ADMIN — SODIUM CHLORIDE 40 MG: 9 INJECTION, SOLUTION INTRAMUSCULAR; INTRAVENOUS; SUBCUTANEOUS at 21:35

## 2022-09-25 RX ADMIN — SODIUM CHLORIDE, POTASSIUM CHLORIDE, SODIUM LACTATE AND CALCIUM CHLORIDE 466 ML: 600; 310; 30; 20 INJECTION, SOLUTION INTRAVENOUS at 16:12

## 2022-09-25 RX ADMIN — SODIUM CHLORIDE 80 MG: 9 INJECTION, SOLUTION INTRAMUSCULAR; INTRAVENOUS; SUBCUTANEOUS at 16:20

## 2022-09-25 RX ADMIN — SODIUM CHLORIDE 500 ML: 9 INJECTION, SOLUTION INTRAVENOUS at 16:08

## 2022-09-25 RX ADMIN — SODIUM CHLORIDE, POTASSIUM CHLORIDE, SODIUM LACTATE AND CALCIUM CHLORIDE 1000 ML: 600; 310; 30; 20 INJECTION, SOLUTION INTRAVENOUS at 16:11

## 2022-09-25 RX ADMIN — SODIUM CHLORIDE, POTASSIUM CHLORIDE, SODIUM LACTATE AND CALCIUM CHLORIDE 1000 ML: 600; 310; 30; 20 INJECTION, SOLUTION INTRAVENOUS at 16:12

## 2022-09-25 RX ADMIN — SODIUM CHLORIDE, PRESERVATIVE FREE 10 ML: 5 INJECTION INTRAVENOUS at 21:35

## 2022-09-25 RX ADMIN — ATORVASTATIN CALCIUM 40 MG: 40 TABLET, FILM COATED ORAL at 21:35

## 2022-09-25 RX ADMIN — SODIUM CHLORIDE 1 G: 900 INJECTION INTRAVENOUS at 18:52

## 2022-09-25 RX ADMIN — SODIUM CHLORIDE 100 ML/HR: 9 INJECTION, SOLUTION INTRAVENOUS at 21:34

## 2022-09-25 NOTE — PROGRESS NOTES
TRANSFER - IN REPORT:    Verbal report received from CANDI Finnegan(name) on Roxie Mcdonald  being received from Emergency Department (unit) for routine progression of care      Report consisted of patients Situation, Background, Assessment and   Recommendations(SBAR). Information from the following report(s) SBAR, Kardex, Intake/Output, MAR, Recent Results, and Cardiac Rhythm Sinus Rhythm  was reviewed with the receiving nurse. Opportunity for questions and clarification was provided. Assessment completed upon patients arrival to unit and care assumed. 1830 Patient arrived on PCU via wheelchair. VSS. Patient is alert and oriented x4. Bed is in low position with call bell within reach. Primary Nurse Jony Pantoja RN performed a dual skin assessment on this patient No impairment noted  Joseph score is 20     1850 Patient has Rocephin being administered. End of Shift Note    Bedside shift change report given to [address] [place] [address] (oncoming nurse) by No Ma (offgoing nurse). Report included the following information SBAR, Kardex, Intake/Output, MAR, Recent Results, and Cardiac Rhythm Sinus Rhythm    Shift worked:  7a-7p     Shift summary and any significant changes:     Patient is currently on an clear liquid diet until midnight, then he is NPO. See note above.    Concerns for physician to address:       Zone phone for oncoming shift:          Activity:     Number times ambulated in hallways past shift: 0  Number of times OOB to chair past shift: 0    Cardiac:   Cardiac Monitoring: Yes      Cardiac Rhythm: Sinus Rhythm    Access:  Current line(s): PIV     Genitourinary:   Urinary status: voiding    Respiratory:   O2 Device: None (Room air)  Chronic home O2 use?: NO  Incentive spirometer at bedside: NO       GI:     Current diet:  DIET NPO  ADULT DIET Clear Liquid  Passing flatus: YES  Tolerating current diet: NO       Pain Management:   Patient states pain is manageable on current regimen: YES    Skin:  Joseph Score: 20  Interventions: float heels, increase time out of bed, and PT/OT consult    Patient Safety:  Fall Score:  Total Score: 1  Interventions: bed/chair alarm, gripper socks, pt to call before getting OOB, and stay with me (per policy)       Length of Stay:  Expected LOS: - - -  Actual LOS: 0      Shaina Samson RN

## 2022-09-25 NOTE — ED PROVIDER NOTES
EMERGENCY DEPARTMENT HISTORY AND PHYSICAL EXAM      Date: 9/25/2022  Patient Name: Cecil Slaughter    History of Presenting Illness     Chief Complaint   Patient presents with    Blood in Vomit     He got nausea and vomited blood; this happened in [place] and he was admitted for sepsis. HPI: Cecil Slaughter, 67 y.o. male presents to the ED with cc of vomiting blood. He says he was feeling fine until today, then he had 1 episode of brown coffee-ground emesis, as well as a bowel movement that was very dark brown in color. He said he started to feel generally weak all over today. No measured temperatures, no chest pain or shortness of breath, no abdominal pain or coughing. He takes aspirin and Plavix. Has a history of a prior GI bleed. He says he no longer feels nauseous. There are no other complaints, changes, or physical findings at this time. PCP: Jerry Anna MD    No current facility-administered medications on file prior to encounter. Current Outpatient Medications on File Prior to Encounter   Medication Sig Dispense Refill    coenzyme q10 10 mg cap Take  by mouth. atorvastatin (LIPITOR) 40 mg tablet TAKE 1 TABLET BY MOUTH DAILY. REPEAT FASTING LABS 3-2-2022 BEFORE FURTHER REFILLS 90 Tablet 3    clopidogreL (PLAVIX) 75 mg tab Take 1 Tablet by mouth daily. 90 Tablet 2    losartan (COZAAR) 25 mg tablet Take 1 Tablet by mouth daily. 90 Tablet 2    aspirin delayed-release 81 mg tablet Take 81 mg by mouth daily. cyanocobalamin 1,000 mcg tablet Take 1,000 mcg by mouth.          Past History     Past Medical History:  Past Medical History:   Diagnosis Date    Essential hypertension     Hyperlipidemia     Neurological disorder     Stroke [facility])        Past Surgical History:  Past Surgical History:   Procedure Laterality Date    HX HERNIA REPAIR  2009    HX HERNIA REPAIR  2007    CT ABDOMEN SURGERY PROC UNLISTED  [id number]    colostomy, reversal       Family History:  Family History Problem Relation Age of Onset    Heart Attack Father        Social History:  Social History     Tobacco Use    Smoking status: Former    Smokeless tobacco: Never   Vaping Use    Vaping Use: Never used   Substance Use Topics    Alcohol use: Yes     Comment: rarely       Allergies: Allergies   Allergen Reactions    Pcn [Penicillins] Unknown (comments)    Sulfa (Sulfonamide Antibiotics) Other (comments)         Review of Systems   no fever  No ear pain  No eye pain  no shortness of breath  no chest pain  no abdominal pain  no dysuria  no leg pain  No rash  No lymphadenopathy  No weight gain    Physical Exam   Physical Exam  Constitutional:       General: He is not in acute distress. Appearance: He is not toxic-appearing. HENT:      Head: Normocephalic and atraumatic. Eyes:      Extraocular Movements: Extraocular movements intact. Cardiovascular:      Rate and Rhythm: Normal rate and regular rhythm. Pulmonary:      Effort: Pulmonary effort is normal.      Breath sounds: Normal breath sounds. Abdominal:      Palpations: Abdomen is soft. Tenderness: There is no abdominal tenderness. Genitourinary:     Comments:  dark brown stool on rectal  Musculoskeletal:         General: No deformity. Cervical back: Neck supple. Skin:     General: Skin is warm and dry. Neurological:      General: No focal deficit present. Mental Status: He is alert and oriented to person, place, and time.    Psychiatric:         Mood and Affect: Mood normal.       Diagnostic Study Results     Labs -     Recent Results (from the past 24 hour(s))   EKG, 12 LEAD, INITIAL    Collection Time: 09/25/22  3:55 PM   Result Value Ref Range    Ventricular Rate 119 BPM    Atrial Rate 119 BPM    P-R Interval 128 ms    QRS Duration 82 ms    Q-T Interval 310 ms    QTC Calculation (Bezet) 436 ms    Calculated P Axis 92 degrees    Calculated R Axis 81 degrees    Calculated T Axis 85 degrees    Diagnosis       Sinus tachycardia  When compared with ECG of 22-JUL-2022 22:57,  Vent. rate has increased BY  39 BPM  Nonspecific T wave abnormality no longer evident in Inferior leads  T wave inversion no longer evident in Lateral leads     POC LACTIC ACID    Collection Time: 09/25/22  4:05 PM   Result Value Ref Range    Lactic Acid (POC) 2.18 (HH) 0.40 - 2.00 mmol/L   TROPONIN-HIGH SENSITIVITY    Collection Time: 09/25/22  4:15 PM   Result Value Ref Range    Troponin-High Sensitivity 27 0 - 76 ng/L   CBC WITH AUTOMATED DIFF    Collection Time: 09/25/22  4:15 PM   Result Value Ref Range    WBC 13.9 (H) 4.1 - 11.1 K/uL    RBC 3.20 (L) 4.10 - 5.70 M/uL    HGB 7.4 (L) 12.1 - 17.0 g/dL    HCT 24.7 (L) 36.6 - 50.3 %    MCV 77.2 (L) 80.0 - 99.0 FL    MCH 23.1 (L) 26.0 - 34.0 PG    MCHC 30.0 30.0 - 36.5 g/dL    RDW 19.2 (H) 11.5 - 14.5 %    PLATELET 142 (H) 550 - 400 K/uL    MPV 8.9 8.9 - 12.9 FL    NRBC 0.0 0  WBC    ABSOLUTE NRBC 0.00 0.00 - 0.01 K/uL    NEUTROPHILS 86 (H) 32 - 75 %    LYMPHOCYTES 7 (L) 12 - 49 %    MONOCYTES 5 5 - 13 %    EOSINOPHILS 0 0 - 7 %    BASOPHILS 1 0 - 1 %    IMMATURE GRANULOCYTES 1 (H) 0.0 - 0.5 %    ABS. NEUTROPHILS 12.1 (H) 1.8 - 8.0 K/UL    ABS. LYMPHOCYTES 1.0 0.8 - 3.5 K/UL    ABS. MONOCYTES 0.6 0.0 - 1.0 K/UL    ABS. EOSINOPHILS 0.0 0.0 - 0.4 K/UL    ABS. BASOPHILS 0.1 0.0 - 0.1 K/UL    ABS. IMM.  GRANS. 0.1 (H) 0.00 - 0.04 K/UL    DF AUTOMATED     METABOLIC PANEL, COMPREHENSIVE    Collection Time: 09/25/22  4:15 PM   Result Value Ref Range    Sodium 138 136 - 145 mmol/L    Potassium 4.4 3.5 - 5.1 mmol/L    Chloride 106 97 - 108 mmol/L    CO2 23 21 - 32 mmol/L    Anion gap 9 5 - 15 mmol/L    Glucose 133 (H) 65 - 100 mg/dL    BUN 41 (H) 6 - 20 MG/DL    Creatinine 1.08 0.70 - 1.30 MG/DL    BUN/Creatinine ratio 38 (H) 12 - 20      GFR est AA >60 >60 ml/min/1.73m2    GFR est non-AA >60 >60 ml/min/1.73m2    Calcium 8.9 8.5 - 10.1 MG/DL    Bilirubin, total 0.5 0.2 - 1.0 MG/DL    ALT (SGPT) 21 12 - 78 U/L    AST (SGOT) 11 (L) 15 - 37 U/L    Alk. phosphatase 59 45 - 117 U/L    Protein, total 6.7 6.4 - 8.2 g/dL    Albumin 2.8 (L) 3.5 - 5.0 g/dL    Globulin 3.9 2.0 - 4.0 g/dL    A-G Ratio 0.7 (L) 1.1 - 2.2     TYPE & SCREEN    Collection Time: 09/25/22  4:15 PM   Result Value Ref Range    Crossmatch Expiration 09/28/2022,2359     ABO/Rh(D) Javier Del Valle POSITIVE     Antibody screen NEG    PROTHROMBIN TIME + INR    Collection Time: 09/25/22  4:15 PM   Result Value Ref Range    INR 1.1 0.9 - 1.1      Prothrombin time 11.2 (H) 9.0 - 11.1 sec   URINALYSIS W/ REFLEX CULTURE    Collection Time: 09/25/22  5:16 PM    Specimen: Urine   Result Value Ref Range    Color DARK YELLOW      Appearance CLEAR CLEAR      Specific gravity 1.018      pH (UA) 6.0 5.0 - 8.0      Protein Negative NEG mg/dL    Glucose Negative NEG mg/dL    Ketone TRACE (A) NEG mg/dL    Bilirubin Negative NEG      Blood Negative NEG      Urobilinogen 1.0 0.2 - 1.0 EU/dL    Nitrites Negative NEG      Leukocyte Esterase Negative NEG      UA:UC IF INDICATED CULTURE NOT INDICATED BY UA RESULT CNI      WBC 0-4 0 - 4 /hpf    RBC 0-5 0 - 5 /hpf    Epithelial cells FEW FEW /lpf    Bacteria Negative NEG /hpf    Hyaline cast 2-5 0 - 2 /lpf   OCCULT BLOOD, STOOL    Collection Time: 09/25/22  5:16 PM   Result Value Ref Range    Occult blood, stool Negative NEG     POC LACTIC ACID    Collection Time: 09/25/22  5:44 PM   Result Value Ref Range    Lactic Acid (POC) 1.72 0.40 - 2.00 mmol/L       Radiologic Studies -   XR CHEST PORT   Final Result   Question airspace opacity in the right upper lobe versus   costochondral hypertrophy of the first rib. Consider follow-up radiographs. CT Results  (Last 48 hours)      None          CXR Results  (Last 48 hours)                 09/25/22 1633  XR CHEST PORT Final result    Impression:  Question airspace opacity in the right upper lobe versus   costochondral hypertrophy of the first rib. Consider follow-up radiographs.        Narrative:  EXAM: XR CHEST PORT INDICATION: Eval for Infiltrate       COMPARISON: None. FINDINGS: A portable AP radiograph of the chest was obtained at 1629 hours. Cardiac monitoring leads. Questionable focal airspace opacity in the right upper   lobe versus costochondral hypertrophy of the first rib. . The cardiac and   mediastinal contours and pulmonary vascularity are normal.  The bones and soft   tissues are grossly within normal limits. Medical Decision Making   I am the first provider for this patient. I reviewed the vital signs, available nursing notes, past medical history, past surgical history, family history and social history. Vital Signs-Reviewed the patient's vital signs. Patient Vitals for the past 24 hrs:   Temp Pulse Resp BP SpO2   09/25/22 1730 -- 85 15 109/66 100 %   09/25/22 1712 -- (!) 101 16 134/66 99 %   09/25/22 1642 -- 88 18 (!) 106/54 100 %   09/25/22 1632 -- 93 16 (!) 114/55 100 %   09/25/22 1617 -- 94 14 102/62 99 %   09/25/22 1546 98.4 °F (36.9 °C) (!) 126 22 (!) 79/48 100 %         Provider Notes (Medical Decision Making):   79-year-old male presenting with coffee-ground emesis. He is hypertensive and tachycardic on arrival.  Concern for GI bleed, symptomatic anemia, electrolyte or metabolic abnormalities, arrhythmia. He is afebrile and nontoxic-appearing, unlikely systemic infection. IV fluids ordered. ED Course:     Initial assessment performed. The patients presenting problems have been discussed, and they are in agreement with the care plan formulated and outlined with them. I have encouraged them to ask questions as they arise throughout their visit. ED Course as of 09/25/22 1745   Sun Sep 25, 2022   1099 Chart is reviewed, patient was discharged on 7/31/2022, was admitted to the ICU for hemorrhagic shock due to upper GI bleed, received received 2 units of packed red blood cells, EGD with gastritis. Also had bacteremia. Takes aspirin and Plavix.  [CM]   5395 Elevated lactic acid likely in setting of GI bleed and blood loss [CM]      ED Course User Index  [CM] Min-Raine Alvarez MD      After initiation of IV fluids, blood pressure improved, and on reevaluation his vitals are unremarkable. No further episodes of emesis here. Patient is given Protonix IV. CBC with leukocytosis of 13.9, hemoglobin is 7.4, prior was 8.8 on 7/27. Repeat lactic acid reassuring at 1.72, UA not suggestive of UTI, basic metabolic panel with normal renal function, no worrisome electrode abnormalities, his BUN is elevated as compared to the creatinine concerning for upper GI bleed. FOBT is negative. Chest x-ray shows questionable airspace opacity right upper lobe versus costochondral hypertrophy. He does not have any fevers, no coughing, lungs clear, unlikely pneumonia. On reevaluation, the patient is resting comfortably, vital stable. He will be admitted. Critical Care Time:     CRITICAL CARE NOTE :    5:50 PM    IMPENDING DETERIORATION -Cardiovascular  ASSOCIATED RISK FACTORS - Hypotension, Shock, and Bleeding  MANAGEMENT-bedside assessment, supervision of care  INTERPRETATION -  ECG and Blood Pressure  INTERVENTIONS -IV fluids, Protonix  CASE REVIEW - Hospitalist/Intensivist, Nursing, and Family  TREATMENT RESPONSE -Improved  PERFORMED BY - Self    NOTES   :  I have spent 45 minutes of critical care time involved in lab review, consultations with specialist, family decision- making, bedside attention and documentation. This time excludes time spent in any separate billed procedures. During this entire length of time I was immediately available to the patient . Cayetano Ray MD      Disposition:  Admit    PLAN:  1. Current Discharge Medication List        2.    Follow-up Information    None       Return to ED if worse     Diagnosis     Clinical Impression: Acute GI bleed, anemia

## 2022-09-25 NOTE — H&P
Hospitalist Admission Note    NAME: Sara Oconnor   :  1950   MRN:  [id number]     Date/Time:  2022 5:45 PM    Patient PCP: Sara Roman MD    Please note that this dictation was completed with [employer], the computer voice recognition software. Quite often unanticipated grammatical, syntax, homophones, and other interpretive errors are inadvertently transcribed by the computer software. Please disregard these errors. Please excuse any errors that have escaped final proofreading  ______________________________________________________________________   Assessment & Plan:  Hemorrhagic shock, POA resolved with IVF bolus 2.4L  --BP 79/48 on presentation, lactic 2.1  --now /66, repeat lactic 1.8  --monitor in stepdown  --hold losartan    Upper GI bleed with coffee ground emesis x 1 and dark stool x 1 with elevated BUN  Acute blood loss anemia, POA  --hgb 7.4, was 8.8 in . Recheck H&H q6h and transfuse prn hgb <7. Anticipate hgb will drop further  --IV protonix bid  --consult GI.  NPO after MN. Had similar admission 2022, required 2 units, EGD with gastritis. --hold aspirin and plavix    Right upper lobe opacity concerning for possible CAP. However patient without pulmonary sx  Leukocytosis WBC 13.9. Hx streptococcus mitis bacteremia 2022  --empiric abx with rocephin and azithromycin. FU blood cultures  --repeat CXR in AM    Infected sebaceous cyst below right eye  --abx with rocephin. Change to Augmentin at discharge  --refer to plastic surgeon outpatient    Hx stroke  --hold aspirin and plavix due to UGIB    Hyperlipidemia  --continue lipitor    Body mass index is 18.62 kg/m².     Code:  full  DVT prophylaxis:  SCD  Surrogate decision maker:  wife          Subjective:   CHIEF COMPLAINT:  vomiting brown emesis, dark stool    HISTORY OF PRESENT ILLNESS:     Sara Oconnor is a 67 y.o. male with PMH CVA on aspirin/plavix, no residual deficit, hx of UGIB with hemorrhagic shock admitted to ICU in 7/2022 requiring 2 units PRBC, with EGD showing gastritis, also had strep mitis bacteremia, HTN presents with acute onset of nausea and vomiting x 1 with dark brown liquid and had 1 BM today with dark stool \"rich chocolate\" color. Associated with lightheadedness. No abdominal pain, chest pain. No cough, fever, chills. Currently not on any PPI. Code sepsis called in ER as patient hypotensive, tachycardic, lactic 2.18 but now felt to be due to GIB rather than infection. Patient given 2.4L LR, 500ml NS and hypotension resolved. No further vomiting or BM. We were asked to admit for work up and evaluation of the above problems. Past Medical History:   Diagnosis Date    Essential hypertension     Hyperlipidemia     Neurological disorder     Stroke [facility])       Past Surgical History:   Procedure Laterality Date    HX HERNIA REPAIR  2009    HX HERNIA REPAIR  2007    [place] ABDOMEN SURGERY PROC UNLISTED  4831,3777    colostomy, reversal     Social History     Tobacco Use    Smoking status: Former    Smokeless tobacco: Never   Substance Use Topics    Alcohol use: Yes     Comment: rarely      Drug use:        Family History   Problem Relation Age of Onset    Cancer Mother     Heart Attack Father      Allergies   Allergen Reactions    Pcn [Penicillins] Unknown (comments)    Sulfa (Sulfonamide Antibiotics) Other (comments)        Prior to Admission medications    Medication Sig Start Date End Date Taking? Authorizing Provider   b complex vitamins tablet Take 1 Tablet by mouth daily as needed. Yes Provider, Historical   coenzyme q10 10 mg cap Take  by mouth. Yes Provider, Historical   atorvastatin (LIPITOR) 40 mg tablet TAKE 1 TABLET BY MOUTH DAILY. REPEAT FASTING LABS 3-2-2022 BEFORE FURTHER REFILLS  Patient taking differently: Take 40 mg by mouth nightly. 2/15/22  Yes Amara Rubalcava MD   clopidogreL (PLAVIX) 75 mg tab Take 1 Tablet by mouth daily.  2/14/22  Yes Raphael Arvizu Juan Luis Roper MD   losartan (COZAAR) 25 mg tablet Take 1 Tablet by mouth daily. 22  Yes Evelia Contreras MD   aspirin delayed-release 81 mg tablet Take 81 mg by mouth nightly. 21  Yes Provider, Historical   cyanocobalamin 1,000 mcg tablet Take 1,000 mcg by mouth. Yes Provider, Historical     REVIEW OF SYSTEMS:  POSITIVE= Bold. Negative = normal text  General:  fever, chills, sweats, generalized weakness, weight loss/gain, loss of appetite  Eyes:  blurred vision, eye pain, loss of vision, diplopia  Ear Nose and Throat:  rhinorrhea, pharyngitis  Respiratory:   cough, sputum production, SOB, wheezing, URBAN, pleuritic pain  Cardiology:  chest pain, palpitations, orthopnea, PND, edema, near syncope   Gastrointestinal:  abdominal pain, N/Vx1, dysphagia, diarrhea, constipation, bleeding \"rich chocolate\" color to stool  Genitourinary:  frequency, urgency, dysuria, hematuria, incontinence  Muskuloskeletal :  arthralgia, myalgia  Hematology:  easy bruising, bleeding, lymphadenopathy  Dermatological:  rash, ulceration, pruritis  Endocrine:  hot flashes or polydipsia  Neurological:  headache, dizziness, confusion, focal weakness, paresthesia, memory loss, gait disturbance  Psychological: anxiety, depression, agitation      Objective:   VITALS:    Visit Vitals  /66   Pulse (!) 101   Temp 98.4 °F (36.9 °C)   Resp 16   Ht 6' 2\" (1.88 m)   Wt 65.8 kg (145 lb)   SpO2 99%   BMI 18.62 kg/m²     Temp (24hrs), Av.4 °F (36.9 °C), Min:98.4 °F (36.9 °C), Max:98.4 °F (36.9 °C)    Body mass index is 18.62 kg/m². PHYSICAL EXAM:    General:    Alert, thin cooperative, no distress, appears stated age. HEENT: Atraumatic, anicteric sclerae, pale conjunctivae     No oral ulcers, mucosa moist, throat clear. Hearing intact. Neck:  Supple, symmetrical,  thyroid: non tender  Lungs:   Clear to auscultation bilaterally upper lungs, decreased BS in bases. No Wheezing or Rhonchi. No rales.   Chest wall:  No tenderness  No Accessory muscle use. Heart:   Regular  rhythm,  No  murmur   No gallop. No edema. Abdomen:   Soft, non-tender. Not distended. Bowel sounds normal. No masses  Extremities: No cyanosis. No clubbing  Skin:     Pale Not Jaundiced  No rashes   Psych:  Good insight. Not depressed. Not anxious or agitated. Neurologic: EOMs intact. No facial asymmetry. No aphasia or slurred speech. Symmetrical strength, Alert and oriented X 3. Peripheral pulse: Left, Radial, 2+  Capillary refill:  abnormal: pale nailbeds    IMAGING RESULTS:   []       I have personally reviewed the actual   []     CXR  []     CT scan  CXR:  CT :  EKG:   ________________________________________________________________________  Care Plan discussed with:    Comments   Patient y    [facility]:      ________________________________________________________________________  Prophylaxis:  GI PPI   DVT SCD   ________________________________________________________________________  Recommended Disposition:   Home with Family y   HH/PT/OT/RN    SNF/LTC    [facility]OR[facility]    ________________________________________________________________________  Code Status:  Full Code y   DNR/DNI    ________________________________________________________________________  TOTAL TIME:  55 minutes      Comments    y Reviewed previous records   >50% of visit spent in counseling and coordination of care  Discussion with patient and/or family and questions answered         ______________________________________________________________________  Christy Lemons MD      Procedures: see electronic medical records for all procedures/Xrays and details which were not copied into this note but were reviewed prior to creation of Plan.     LAB DATA REVIEWED:    Recent Results (from the past 24 hour(s))   EKG, 12 LEAD, INITIAL    Collection Time: 09/25/22  3:55 PM   Result Value Ref Range    Ventricular Rate 119 BPM    Atrial Rate 119 BPM    P-R Interval 128 ms    QRS Duration 82 ms    Q-T Interval 310 ms    QTC Calculation (Bezet) 436 ms    Calculated P Axis 92 degrees    Calculated R Axis 81 degrees    Calculated T Axis 85 degrees    Diagnosis       Sinus tachycardia  When compared with ECG of 22-JUL-2022 22:57,  Vent. rate has increased BY  39 BPM  Nonspecific T wave abnormality no longer evident in Inferior leads  T wave inversion no longer evident in Lateral leads     POC LACTIC ACID    Collection Time: 09/25/22  4:05 PM   Result Value Ref Range    Lactic Acid (POC) 2.18 (HH) 0.40 - 2.00 mmol/L   TROPONIN-HIGH SENSITIVITY    Collection Time: 09/25/22  4:15 PM   Result Value Ref Range    Troponin-High Sensitivity 27 0 - 76 ng/L   CBC WITH AUTOMATED DIFF    Collection Time: 09/25/22  4:15 PM   Result Value Ref Range    WBC 13.9 (H) 4.1 - 11.1 K/uL    RBC 3.20 (L) 4.10 - 5.70 M/uL    HGB 7.4 (L) 12.1 - 17.0 g/dL    HCT 24.7 (L) 36.6 - 50.3 %    MCV 77.2 (L) 80.0 - 99.0 FL    MCH 23.1 (L) 26.0 - 34.0 PG    MCHC 30.0 30.0 - 36.5 g/dL    RDW 19.2 (H) 11.5 - 14.5 %    PLATELET 117 (H) 676 - 400 K/uL    MPV 8.9 8.9 - 12.9 FL    NRBC 0.0 0  WBC    ABSOLUTE NRBC 0.00 0.00 - 0.01 K/uL    NEUTROPHILS 86 (H) 32 - 75 %    LYMPHOCYTES 7 (L) 12 - 49 %    MONOCYTES 5 5 - 13 %    EOSINOPHILS 0 0 - 7 %    BASOPHILS 1 0 - 1 %    IMMATURE GRANULOCYTES 1 (H) 0.0 - 0.5 %    ABS. NEUTROPHILS 12.1 (H) 1.8 - 8.0 K/UL    ABS. LYMPHOCYTES 1.0 0.8 - 3.5 K/UL    ABS. MONOCYTES 0.6 0.0 - 1.0 K/UL    ABS. EOSINOPHILS 0.0 0.0 - 0.4 K/UL    ABS. BASOPHILS 0.1 0.0 - 0.1 K/UL    ABS. IMM.  GRANS. 0.1 (H) 0.00 - 0.04 K/UL    DF AUTOMATED     METABOLIC PANEL, COMPREHENSIVE    Collection Time: 09/25/22  4:15 PM   Result Value Ref Range    Sodium 138 136 - 145 mmol/L    Potassium 4.4 3.5 - 5.1 mmol/L    Chloride 106 97 - 108 mmol/L    CO2 23 21 - 32 mmol/L    Anion gap 9 5 - 15 mmol/L    Glucose 133 (H) 65 - 100 mg/dL    BUN 41 (H) 6 - 20 MG/DL    Creatinine 1.08 0.70 - 1.30 MG/DL BUN/Creatinine ratio 38 (H) 12 - 20      GFR est AA >60 >60 ml/min/1.73m2    GFR est non-AA >60 >60 ml/min/1.73m2    Calcium 8.9 8.5 - 10.1 MG/DL    Bilirubin, total 0.5 0.2 - 1.0 MG/DL    ALT (SGPT) 21 12 - 78 U/L    AST (SGOT) 11 (L) 15 - 37 U/L    Alk.  phosphatase 59 45 - 117 U/L    Protein, total 6.7 6.4 - 8.2 g/dL    Albumin 2.8 (L) 3.5 - 5.0 g/dL    Globulin 3.9 2.0 - 4.0 g/dL    A-G Ratio 0.7 (L) 1.1 - 2.2     TYPE & SCREEN    Collection Time: 09/25/22  4:15 PM   Result Value Ref Range    Crossmatch Expiration 09/28/2022,2359     ABO/Rh(D) Joi Syedar POSITIVE     Antibody screen NEG    PROTHROMBIN TIME + INR    Collection Time: 09/25/22  4:15 PM   Result Value Ref Range    INR 1.1 0.9 - 1.1      Prothrombin time 11.2 (H) 9.0 - 11.1 sec   URINALYSIS W/ REFLEX CULTURE    Collection Time: 09/25/22  5:16 PM    Specimen: Urine   Result Value Ref Range    Color DARK YELLOW      Appearance CLEAR CLEAR      Specific gravity 1.018      pH (UA) 6.0 5.0 - 8.0      Protein Negative NEG mg/dL    Glucose Negative NEG mg/dL    Ketone TRACE (A) NEG mg/dL    Bilirubin Negative NEG      Blood Negative NEG      Urobilinogen 1.0 0.2 - 1.0 EU/dL    Nitrites Negative NEG      Leukocyte Esterase Negative NEG      UA:UC IF INDICATED CULTURE NOT INDICATED BY UA RESULT CNI      WBC 0-4 0 - 4 /hpf    RBC 0-5 0 - 5 /hpf    Epithelial cells FEW FEW /lpf    Bacteria Negative NEG /hpf    Hyaline cast 2-5 0 - 2 /lpf   OCCULT BLOOD, STOOL    Collection Time: 09/25/22  5:16 PM   Result Value Ref Range    Occult blood, stool Negative NEG

## 2022-09-26 ENCOUNTER — ANESTHESIA EVENT (OUTPATIENT)
Dept: ENDOSCOPY | Age: 72
DRG: 377 | End: 2022-09-26
Payer: MEDICARE

## 2022-09-26 ENCOUNTER — APPOINTMENT (OUTPATIENT)
Dept: GENERAL RADIOLOGY | Age: 72
DRG: 377 | End: 2022-09-26
Attending: HOSPITALIST
Payer: MEDICARE

## 2022-09-26 ENCOUNTER — ANESTHESIA (OUTPATIENT)
Dept: ENDOSCOPY | Age: 72
DRG: 377 | End: 2022-09-26
Payer: MEDICARE

## 2022-09-26 LAB
ANION GAP SERPL CALC-SCNC: 6 MMOL/L (ref 5–15)
BASOPHILS # BLD: 0.1 K/UL (ref 0–0.1)
BASOPHILS NFR BLD: 1 % (ref 0–1)
BUN SERPL-MCNC: 28 MG/DL (ref 6–20)
BUN/CREAT SERPL: 35 (ref 12–20)
CALCIUM SERPL-MCNC: 8.4 MG/DL (ref 8.5–10.1)
CHLORIDE SERPL-SCNC: 111 MMOL/L (ref 97–108)
CO2 SERPL-SCNC: 23 MMOL/L (ref 21–32)
CREAT SERPL-MCNC: 0.79 MG/DL (ref 0.7–1.3)
DIFFERENTIAL METHOD BLD: ABNORMAL
EOSINOPHIL # BLD: 0.1 K/UL (ref 0–0.4)
EOSINOPHIL NFR BLD: 1 % (ref 0–7)
ERYTHROCYTE [DISTWIDTH] IN BLOOD BY AUTOMATED COUNT: 18.9 % (ref 11.5–14.5)
GLUCOSE SERPL-MCNC: 91 MG/DL (ref 65–100)
HCT VFR BLD AUTO: 25.3 % (ref 36.6–50.3)
HCT VFR BLD AUTO: 27.8 % (ref 36.6–50.3)
HGB BLD-MCNC: 7.8 G/DL (ref 12.1–17)
HGB BLD-MCNC: 8.7 G/DL (ref 12.1–17)
IMM GRANULOCYTES # BLD AUTO: 0 K/UL (ref 0–0.04)
IMM GRANULOCYTES NFR BLD AUTO: 0 % (ref 0–0.5)
LYMPHOCYTES # BLD: 1.5 K/UL (ref 0.8–3.5)
LYMPHOCYTES NFR BLD: 14 % (ref 12–49)
MCH RBC QN AUTO: 24.6 PG (ref 26–34)
MCHC RBC AUTO-ENTMCNC: 31.3 G/DL (ref 30–36.5)
MCV RBC AUTO: 78.8 FL (ref 80–99)
MONOCYTES # BLD: 0.9 K/UL (ref 0–1)
MONOCYTES NFR BLD: 9 % (ref 5–13)
NEUTS SEG # BLD: 7.9 K/UL (ref 1.8–8)
NEUTS SEG NFR BLD: 75 % (ref 32–75)
NRBC # BLD: 0 K/UL (ref 0–0.01)
NRBC BLD-RTO: 0 PER 100 WBC
PLATELET # BLD AUTO: 360 K/UL (ref 150–400)
PMV BLD AUTO: 8.8 FL (ref 8.9–12.9)
POTASSIUM SERPL-SCNC: 3.8 MMOL/L (ref 3.5–5.1)
RBC # BLD AUTO: 3.53 M/UL (ref 4.1–5.7)
SODIUM SERPL-SCNC: 140 MMOL/L (ref 136–145)
WBC # BLD AUTO: 10.5 K/UL (ref 4.1–11.1)

## 2022-09-26 PROCEDURE — 36415 COLL VENOUS BLD VENIPUNCTURE: CPT

## 2022-09-26 PROCEDURE — 74011000250 HC RX REV CODE- 250: Performed by: INTERNAL MEDICINE

## 2022-09-26 PROCEDURE — 74011250637 HC RX REV CODE- 250/637: Performed by: INTERNAL MEDICINE

## 2022-09-26 PROCEDURE — 0DB78ZX EXCISION OF STOMACH, PYLORUS, VIA NATURAL OR ARTIFICIAL OPENING ENDOSCOPIC, DIAGNOSTIC: ICD-10-PCS | Performed by: INTERNAL MEDICINE

## 2022-09-26 PROCEDURE — 74011000258 HC RX REV CODE- 258: Performed by: HOSPITALIST

## 2022-09-26 PROCEDURE — 74011250636 HC RX REV CODE- 250/636: Performed by: INTERNAL MEDICINE

## 2022-09-26 PROCEDURE — 77030019988 HC FCPS ENDOSC DISP BSC -B: Performed by: INTERNAL MEDICINE

## 2022-09-26 PROCEDURE — 76060000031 HC ANESTHESIA FIRST 0.5 HR: Performed by: INTERNAL MEDICINE

## 2022-09-26 PROCEDURE — 71046 X-RAY EXAM CHEST 2 VIEWS: CPT

## 2022-09-26 PROCEDURE — P9016 RBC LEUKOCYTES REDUCED: HCPCS

## 2022-09-26 PROCEDURE — 85018 HEMOGLOBIN: CPT

## 2022-09-26 PROCEDURE — 2709999900 HC NON-CHARGEABLE SUPPLY: Performed by: INTERNAL MEDICINE

## 2022-09-26 PROCEDURE — 74011250637 HC RX REV CODE- 250/637: Performed by: HOSPITALIST

## 2022-09-26 PROCEDURE — 74011000250 HC RX REV CODE- 250: Performed by: HOSPITALIST

## 2022-09-26 PROCEDURE — 36430 TRANSFUSION BLD/BLD COMPNT: CPT

## 2022-09-26 PROCEDURE — C9113 INJ PANTOPRAZOLE SODIUM, VIA: HCPCS | Performed by: HOSPITALIST

## 2022-09-26 PROCEDURE — 65270000046 HC RM TELEMETRY

## 2022-09-26 PROCEDURE — 74011250636 HC RX REV CODE- 250/636: Performed by: HOSPITALIST

## 2022-09-26 PROCEDURE — 88305 TISSUE EXAM BY PATHOLOGIST: CPT

## 2022-09-26 PROCEDURE — 76040000019: Performed by: INTERNAL MEDICINE

## 2022-09-26 PROCEDURE — 85025 COMPLETE CBC W/AUTO DIFF WBC: CPT

## 2022-09-26 PROCEDURE — 74011250636 HC RX REV CODE- 250/636: Performed by: ANESTHESIOLOGY

## 2022-09-26 PROCEDURE — 80048 BASIC METABOLIC PNL TOTAL CA: CPT

## 2022-09-26 PROCEDURE — 74011000250 HC RX REV CODE- 250: Performed by: ANESTHESIOLOGY

## 2022-09-26 RX ORDER — MIDAZOLAM HYDROCHLORIDE 1 MG/ML
.25-5 INJECTION, SOLUTION INTRAMUSCULAR; INTRAVENOUS
Status: DISCONTINUED | OUTPATIENT
Start: 2022-09-26 | End: 2022-09-26 | Stop reason: HOSPADM

## 2022-09-26 RX ORDER — SODIUM CHLORIDE 0.9 % (FLUSH) 0.9 %
5-40 SYRINGE (ML) INJECTION AS NEEDED
Status: DISCONTINUED | OUTPATIENT
Start: 2022-09-26 | End: 2022-09-27 | Stop reason: HOSPADM

## 2022-09-26 RX ORDER — SODIUM CHLORIDE 9 MG/ML
100 INJECTION, SOLUTION INTRAVENOUS CONTINUOUS
Status: DISPENSED | OUTPATIENT
Start: 2022-09-26 | End: 2022-09-26

## 2022-09-26 RX ORDER — SODIUM CHLORIDE 9 MG/ML
100 INJECTION, SOLUTION INTRAVENOUS CONTINUOUS
Status: DISCONTINUED | OUTPATIENT
Start: 2022-09-26 | End: 2022-09-27 | Stop reason: HOSPADM

## 2022-09-26 RX ORDER — LIDOCAINE HYDROCHLORIDE 20 MG/ML
INJECTION, SOLUTION EPIDURAL; INFILTRATION; INTRACAUDAL; PERINEURAL AS NEEDED
Status: DISCONTINUED | OUTPATIENT
Start: 2022-09-26 | End: 2022-09-26 | Stop reason: HOSPADM

## 2022-09-26 RX ORDER — SODIUM CHLORIDE 0.9 % (FLUSH) 0.9 %
5-40 SYRINGE (ML) INJECTION EVERY 8 HOURS
Status: DISCONTINUED | OUTPATIENT
Start: 2022-09-26 | End: 2022-09-27 | Stop reason: HOSPADM

## 2022-09-26 RX ORDER — ATROPINE SULFATE 0.1 MG/ML
0.5 INJECTION INTRAVENOUS
Status: DISCONTINUED | OUTPATIENT
Start: 2022-09-26 | End: 2022-09-26 | Stop reason: HOSPADM

## 2022-09-26 RX ORDER — NALOXONE HYDROCHLORIDE 0.4 MG/ML
0.4 INJECTION, SOLUTION INTRAMUSCULAR; INTRAVENOUS; SUBCUTANEOUS
Status: DISCONTINUED | OUTPATIENT
Start: 2022-09-26 | End: 2022-09-26 | Stop reason: HOSPADM

## 2022-09-26 RX ORDER — FLUMAZENIL 0.1 MG/ML
0.2 INJECTION INTRAVENOUS
Status: DISCONTINUED | OUTPATIENT
Start: 2022-09-26 | End: 2022-09-26 | Stop reason: HOSPADM

## 2022-09-26 RX ORDER — SUCRALFATE 1 G/1
1 TABLET ORAL
Status: DISCONTINUED | OUTPATIENT
Start: 2022-09-26 | End: 2022-09-27 | Stop reason: HOSPADM

## 2022-09-26 RX ORDER — DEXTROMETHORPHAN/PSEUDOEPHED 2.5-7.5/.8
1.2 DROPS ORAL
Status: DISCONTINUED | OUTPATIENT
Start: 2022-09-26 | End: 2022-09-26 | Stop reason: HOSPADM

## 2022-09-26 RX ORDER — PROPOFOL 10 MG/ML
INJECTION, EMULSION INTRAVENOUS AS NEEDED
Status: DISCONTINUED | OUTPATIENT
Start: 2022-09-26 | End: 2022-09-26 | Stop reason: HOSPADM

## 2022-09-26 RX ORDER — EPINEPHRINE 0.1 MG/ML
1 INJECTION INTRACARDIAC; INTRAVENOUS
Status: DISCONTINUED | OUTPATIENT
Start: 2022-09-26 | End: 2022-09-26 | Stop reason: HOSPADM

## 2022-09-26 RX ADMIN — SODIUM CHLORIDE, PRESERVATIVE FREE 20 ML: 5 INJECTION INTRAVENOUS at 16:44

## 2022-09-26 RX ADMIN — SODIUM CHLORIDE, PRESERVATIVE FREE 10 ML: 5 INJECTION INTRAVENOUS at 22:25

## 2022-09-26 RX ADMIN — SUCRALFATE 1 G: 1 TABLET ORAL at 22:23

## 2022-09-26 RX ADMIN — SODIUM CHLORIDE 40 MG: 9 INJECTION, SOLUTION INTRAMUSCULAR; INTRAVENOUS; SUBCUTANEOUS at 10:45

## 2022-09-26 RX ADMIN — ATORVASTATIN CALCIUM 40 MG: 40 TABLET, FILM COATED ORAL at 22:23

## 2022-09-26 RX ADMIN — SODIUM CHLORIDE, PRESERVATIVE FREE 10 ML: 5 INJECTION INTRAVENOUS at 10:45

## 2022-09-26 RX ADMIN — LIDOCAINE HYDROCHLORIDE 80 MG: 20 INJECTION, SOLUTION EPIDURAL; INFILTRATION; INTRACAUDAL; PERINEURAL at 15:36

## 2022-09-26 RX ADMIN — SODIUM CHLORIDE 40 MG: 9 INJECTION, SOLUTION INTRAMUSCULAR; INTRAVENOUS; SUBCUTANEOUS at 22:23

## 2022-09-26 RX ADMIN — SODIUM CHLORIDE 100 ML/HR: 9 INJECTION, SOLUTION INTRAVENOUS at 08:25

## 2022-09-26 RX ADMIN — SODIUM CHLORIDE, PRESERVATIVE FREE 10 ML: 5 INJECTION INTRAVENOUS at 08:26

## 2022-09-26 RX ADMIN — SODIUM CHLORIDE, PRESERVATIVE FREE 10 ML: 5 INJECTION INTRAVENOUS at 22:24

## 2022-09-26 RX ADMIN — SODIUM CHLORIDE 1 G: 900 INJECTION INTRAVENOUS at 18:38

## 2022-09-26 RX ADMIN — PROPOFOL 120 MG: 10 INJECTION, EMULSION INTRAVENOUS at 15:50

## 2022-09-26 RX ADMIN — SODIUM CHLORIDE 500 MG: 9 INJECTION, SOLUTION INTRAVENOUS at 18:55

## 2022-09-26 RX ADMIN — SUCRALFATE 1 G: 1 TABLET ORAL at 18:36

## 2022-09-26 RX ADMIN — POLYMYXIN B SULFATE, BACITRACIN ZINC, NEOMYCIN SULFATE: 5000; 3.5; 4 OINTMENT TOPICAL at 13:06

## 2022-09-26 RX ADMIN — SODIUM CHLORIDE 100 ML/HR: 9 INJECTION, SOLUTION INTRAVENOUS at 13:41

## 2022-09-26 RX ADMIN — SODIUM CHLORIDE 100 ML/HR: 9 INJECTION, SOLUTION INTRAVENOUS at 22:30

## 2022-09-26 RX ADMIN — POLYMYXIN B SULFATE, BACITRACIN ZINC, NEOMYCIN SULFATE: 5000; 3.5; 4 OINTMENT TOPICAL at 18:44

## 2022-09-26 NOTE — PROGRESS NOTES
Endoscope was pre-cleaned at the bedside immediately following procedure by JOEY Mauricio For medications administered by anesthesia, see anesthesia chart.

## 2022-09-26 NOTE — CONSENT
Informed Consent for Blood Component Transfusion Note    I have discussed with the patient the rationale for blood component transfusion; its benefits in treating or preventing fatigue, organ damage, or death; and its risk which includes mild transfusion reactions, rare risk of blood borne infection, or more serious but rare reactions. I have discussed the alternatives to transfusion, including the risk and consequences of not receiving transfusion. The patient had an opportunity to ask questions and had agreed to proceed with transfusion of blood components. Consent obtained in event patient's hgb drops below 7.     Electronically signed by Becky Bassett MD on 9/25/22 at 10:53 PM

## 2022-09-26 NOTE — PROGRESS NOTES
TRANSFER - OUT REPORT:    Verbal report given to Aiden Bonilla RN(name) on Alyssa Marc  being transferred to room 2266(unit) for routine progression of care       Report consisted of patients Situation, Background, Assessment and   Recommendations(SBAR). Information from the following report(s) Procedure Summary and Intake/Output was reviewed with the receiving nurse. Lines:   Peripheral IV 09/25/22 Left Antecubital (Active)   Site Assessment Clean, dry, & intact 09/26/22 1500   Phlebitis Assessment 0 09/26/22 1500   Infiltration Assessment 0 09/26/22 1500   Dressing Status Clean, dry, & intact 09/26/22 1500   Dressing Type Transparent 09/26/22 1500   Hub Color/Line Status Pink;Capped 09/26/22 1500   Action Taken Open ports on tubing capped 09/26/22 1500   Alcohol Cap Used Yes 09/26/22 1500       Peripheral IV 09/25/22 Right Antecubital (Active)   Site Assessment Clean, dry, & intact 09/26/22 1500   Phlebitis Assessment 0 09/26/22 1500   Infiltration Assessment 0 09/26/22 1500   Dressing Status Clean, dry, & intact 09/26/22 1500   Dressing Type Transparent 09/26/22 1500   Hub Color/Line Status Pink; Infusing 09/26/22 1500   Alcohol Cap Used No 09/26/22 1500        Opportunity for questions and clarification was provided.

## 2022-09-26 NOTE — ANESTHESIA POSTPROCEDURE EVALUATION
Procedure(s):  ESOPHAGOGASTRODUODENOSCOPY (EGD)  ESOPHAGOGASTRODUODENAL (EGD) BIOPSY. total IV anesthesia    Anesthesia Post Evaluation        Patient location during evaluation: PACU  Note status: Adequate. Level of consciousness: responsive to verbal stimuli and sleepy but conscious  Pain management: satisfactory to patient  Airway patency: patent  Anesthetic complications: no  Cardiovascular status: acceptable  Respiratory status: acceptable  Hydration status: acceptable  Comments: +Post-Anesthesia Evaluation and Assessment    Patient: Gonzalez Spring MRN: [id number]  SSN: [id number]   YOB: 1950  Age: 67 y.o. Sex: male      Cardiovascular Function/Vital Signs    BP (!) 115/46   Pulse 73   Temp 37.2 °C (98.9 °F)   Resp 13   Ht 6' 2\" (1.88 m)   Wt 65.8 kg (145 lb)   SpO2 99%   BMI 18.62 kg/m²     Patient is status post Procedure(s):  ESOPHAGOGASTRODUODENOSCOPY (EGD)  ESOPHAGOGASTRODUODENAL (EGD) BIOPSY. Nausea/Vomiting: Controlled. Postoperative hydration reviewed and adequate. Pain:  Pain Scale 1: Numeric (0 - 10) (09/26/22 1602)  Pain Intensity 1: 0 (09/26/22 1602)   Managed. Neurological Status: At baseline. Mental Status and Level of Consciousness: Arousable. Pulmonary Status:   O2 Device: None (Room air) (09/26/22 1612)   Adequate oxygenation and airway patent. Complications related to anesthesia: None    Post-anesthesia assessment completed. No concerns. Signed By: Goldie Farooq DO    9/26/2022  Post anesthesia nausea and vomiting:  controlled      INITIAL Post-op Vital signs:   Vitals Value Taken Time   /56 09/26/22 1615   Temp 37.2 °C (98.9 °F) 09/26/22 1602   Pulse 74 09/26/22 1618   Resp 16 09/26/22 1618   SpO2 99 % 09/26/22 1617   Vitals shown include unvalidated device data.

## 2022-09-26 NOTE — ROUTINE PROCESS
TRANSFER - IN REPORT:    Verbal report received from Dominga (name) on Sara Oconnor  being received from endo(unit) for routine progression of care      Report consisted of patients Situation, Background, Assessment and   Recommendations(SBAR). Information from the following report(s) SBAR, Procedure Summary, and MAR was reviewed with the receiving nurse. Opportunity for questions and clarification was provided. Assessment completed upon patients arrival to unit and care assumed.

## 2022-09-26 NOTE — PROGRESS NOTES
Hospitalist Progress Note    NAME: Reina Mann   :  1950   MRN:  [id number]       Assessment / Plan:  Hemorrhagic shock, POA resolved with IVF bolus 2.4L  --BP 79/48 on presentation, lactic 2.1  -- Now blood pressure stabilized  --monitor in stepdown  -- Continue to hold antihypertensive medications     Upper GI bleed with coffee ground emesis x 1 and dark stool x 1 with elevated BUN  Acute blood loss anemia, POA  --hgb 7.4, was 8.8 in . Recheck H&H q6h and transfuse prn hgb <7.   -Hemoglobin 5.7 overnight, required 2 units PRBC  -Recheck hemoglobin 8.7 today  -GI consulted, n.p.o. currently  -Holding DAPT  -. Had similar admission 2022, required 2 units, EGD with gastritis. --hold aspirin and plavix     Right upper lobe opacity concerning for possible CAP. However patient without pulmonary sx  Leukocytosis WBC 13.9. Hx streptococcus mitis bacteremia 2022  --empiric abx with rocephin and azithromycin. FU blood cultures  --repeat CXR in     Infected sebaceous cyst below right eye  --abx with rocephin. Change to Augmentin at discharge  --refer to plastic surgeon outpatient     Hx stroke  --hold aspirin and plavix due to UGIB     Hyperlipidemia  --continue lipitor     Body mass index is 18.62 kg/m². Code:  full  DVT prophylaxis:  SCD  Surrogate decision maker:  wife      Anticipate discharge         Subjective:     Chief Complaint / Reason for Physician Visit  \"No further hematemesis overnight. No melena. Reports right facial cyst has been bothering for many weeks\". Discussed with RN events overnight.      Review of Systems:  Symptom Y/N Comments  Symptom Y/N Comments   Fever/Chills    Chest Pain     Poor Appetite    Edema     Cough    Abdominal Pain     Sputum    Joint Pain     SOB/URBAN    Pruritis/Rash     Nausea/vomit    Tolerating PT/OT     Diarrhea    Tolerating Diet     Constipation    Other       Could NOT obtain due to:      Objective:     VITALS:   Last 24hrs VS reviewed since prior progress note.  Most recent are:  Patient Vitals for the past 24 hrs:   Temp Pulse Resp BP SpO2   09/26/22 1048 98.4 °F (36.9 °C) 77 18 (!) 141/62 98 %   09/26/22 0830 98.3 °F (36.8 °C) 82 17 126/77 97 %   09/26/22 0800 -- 89 14 (!) 147/78 99 %   09/26/22 0730 98 °F (36.7 °C) 87 14 (!) 155/73 99 %   09/26/22 0700 98.2 °F (36.8 °C) 78 14 (!) 141/89 100 %   09/26/22 0630 98.3 °F (36.8 °C) 91 14 (!) 154/76 99 %   09/26/22 0600 98.2 °F (36.8 °C) 86 12 128/69 98 %   09/26/22 0515 98.3 °F (36.8 °C) 70 16 (!) 152/75 98 %   09/26/22 0500 98.3 °F (36.8 °C) 74 16 130/64 98 %   09/26/22 0445 98.3 °F (36.8 °C) 76 14 133/64 97 %   09/26/22 0430 98.3 °F (36.8 °C) 80 13 118/66 98 %   09/26/22 0415 98.4 °F (36.9 °C) 87 16 139/75 99 %   09/26/22 0330 98.2 °F (36.8 °C) 81 15 (!) 142/63 99 %   09/26/22 0245 98.1 °F (36.7 °C) 96 16 (!) 144/98 100 %   09/26/22 0145 98.1 °F (36.7 °C) 91 17 129/68 100 %   09/26/22 0115 98.2 °F (36.8 °C) 88 15 (!) 123/58 98 %   09/26/22 0100 98.2 °F (36.8 °C) 81 12 (!) 118/54 98 %   09/26/22 0045 98.2 °F (36.8 °C) 85 10 116/66 98 %   09/26/22 0039 98.2 °F (36.8 °C) 86 14 (!) 122/59 99 %   09/26/22 0019 98.3 °F (36.8 °C) 85 15 115/80 99 %   09/26/22 0000 -- 78 -- -- --   09/25/22 2247 98.5 °F (36.9 °C) 83 12 127/60 100 %   09/25/22 2000 -- 87 -- -- --   09/25/22 1914 98.5 °F (36.9 °C) 91 15 127/62 100 %   09/25/22 1830 98.4 °F (36.9 °C) 99 18 111/63 100 %   09/25/22 1800 -- -- -- (!) 131/58 --   09/25/22 1745 -- -- -- 124/72 --   09/25/22 1730 98.7 °F (37.1 °C) 85 15 109/66 100 %   09/25/22 1712 -- (!) 101 16 134/66 99 %   09/25/22 1642 -- 88 18 (!) 106/54 100 %   09/25/22 1632 -- 93 16 (!) 114/55 100 %   09/25/22 1617 -- 94 14 102/62 99 %   09/25/22 1546 98.4 °F (36.9 °C) (!) 126 22 (!) 79/48 100 %       Intake/Output Summary (Last 24 hours) at 9/26/2022 1128  Last data filed at 9/26/2022 1051  Gross per 24 hour   Intake 3832.25 ml   Output 1665 ml   Net 2167.25 ml        I had a face to face encounter and independently examined this patient on 9/26/2022, as outlined below:  PHYSICAL EXAM:  General: WD, WN. Alert, cooperative, no acute distress    EENT:  EOMI. Anicteric sclerae. MMM  Resp:  CTA bilaterally, no wheezing or rales. No accessory muscle use  CV:  Regular  rhythm,  No edema  GI:  Soft, Non distended, Non tender. +Bowel sounds  Neurologic:  Alert and oriented X 3, normal speech,   Psych:   Good insight. Not anxious nor agitated  Skin:  No rashes. No jaundice    Reviewed most current lab test results and cultures  YES  Reviewed most current radiology test results   YES  Review and summation of old records today    NO  Reviewed patient's current orders and MAR    YES  PMH/SH reviewed - no change compared to H&P  ________________________________________________________________________  Care Plan discussed with:    Comments   Patient x    Family      RN x    Care Manager     Consultant                        Multidiciplinary team rounds were held today with , nursing, pharmacist and clinical coordinator. Patient's plan of care was discussed; medications were reviewed and discharge planning was addressed. ________________________________________________________________________  Total NON critical care TIME:  23   Minutes    Total CRITICAL CARE TIME Spent:   Minutes non procedure based      Comments   >50% of visit spent in counseling and coordination of care     ________________________________________________________________________  Key Penny MD     Procedures: see electronic medical records for all procedures/Xrays and details which were not copied into this note but were reviewed prior to creation of Plan. LABS:  I reviewed today's most current labs and imaging studies.   Pertinent labs include:  Recent Labs     09/26/22  1020 09/25/22  2241 09/25/22  1615   WBC 10.5  --  13.9*   HGB 8.7* 5.7* 7.4*   HCT 27.8* 18.7* 24.7*     --  560*     Recent Labs 09/26/22  1020 09/25/22  1615    138   K 3.8 4.4   * 106   CO2 23 23   GLU 91 133*   BUN 28* 41*   CREA 0.79 1.08   CA 8.4* 8.9   ALB  --  2.8*   TBILI  --  0.5   ALT  --  21   INR  --  1.1       Signed: Jelena George MD

## 2022-09-26 NOTE — PROGRESS NOTES
Patient returned to room 2266 with transport team. All personal belongings with patient including glasses and dentures

## 2022-09-26 NOTE — PROGRESS NOTES
Comprehensive Nutrition Assessment    Type and Reason for Visit: Initial, Positive nutrition screen    Nutrition Recommendations/Plan:   Advance to a regular diet as tolerated  Add magic cups BID when diet allows      Malnutrition Assessment:  Malnutrition Status:  No malnutrition (09/26/22 1112)      Nutrition Assessment:    Patient medically noted for coffee ground emesis and hypotension. PMH Stroke and hyperlipidemia. Patient currently NPO pending GI evaluation. He reports a great appetite at home and was able to gain 4 of 5#. Drinking boost for supplementation. Declines ensure while in the hospital but agreeable to trial of magic cups. No significant weight changes for timeframes noted on chart review. Will monitor diet advancement and tolerance. Nutrition Related Findings:    H/H 5.7/18.7  BM 9/25  Atorvastatin, Protonix   Wound Type: None    Current Nutrition Intake & Therapies:        DIET NPO    Anthropometric Measures:  Height: 6' 2\" (188 cm)  Ideal Body Weight (IBW): 190 lbs (86 kg)     Current Body Wt:  65.8 kg (145 lb 1 oz), 76.3 % IBW. Current BMI (kg/m2): 18.6                          BMI Category: Normal weight (BMI 18.5-24. 9)    Estimated Daily Nutrient Needs:  Energy Requirements Based On: Formula  Weight Used for Energy Requirements: Current  Energy (kcal/day): 2174 kcals (BMR 1480 x 1.3AF +250kcals)  Weight Used for Protein Requirements: Current  Protein (g/day): 79g (1.2 g/kg bw)  Method Used for Fluid Requirements: 1 ml/kcal  Fluid (ml/day): 2150 mL    Nutrition Diagnosis:   Altered GI function related to altered GI structure as evidenced by NPO, coffee ground emesis     Nutrition Interventions:   Food and/or Nutrient Delivery: Start oral diet, Start oral nutrition supplement  Nutrition Education/Counseling: No recommendations at this time  Coordination of Nutrition Care: Continue to monitor while inpatient       Goals:     Goals: PO intake 75% or greater, within 7 days       Nutrition Monitoring and Evaluation:   Behavioral-Environmental Outcomes: None identified  Food/Nutrient Intake Outcomes: Food and nutrient intake, Diet advancement/tolerance, Supplement intake  Physical Signs/Symptoms Outcomes: Biochemical data, Weight, GI status    Discharge Planning:     Too soon to determine    Mark Head RD  Contact: ext [phone]

## 2022-09-26 NOTE — CONSULTS
GI CONSULTATION NOTE  Christen Brown, YAIMA  [phone] NP in-hospital cell phone M-F until 4:30  After 5pm or on weekends, please call  for physician on call    NAME: Falguni Stacy  :  1950  MRN:  [id number]   Attending: Dr Morenita Bejarano   Primary GI:   Date/Time:  2022 12:20 PM  Assessment:   Concern for upper GI bleed with Coffee ground emesis x 1 and melena x 1  On Plavix and ASA 81mg daily - last dose 22  Acute Blood Loss Anemia - Hgb baseline around 8.8, dropped to 7.4 for admission, then down to 5.7 overnight. Received 2 units of PRBC, now at 8.7.   - Elevated BUN/Cr on admission 22 : BUN 41, now down to 28  - No more stools per patient  - Mild epigastric discomfort - no additional NSAID use. - Hx of similar symptoms in 2022 - noted gastritis on EGD, pt was to be on PPI BID x 8 weeks, but pt states he is not taking any PPI or H2A. EGD 2022 : -- moderate focal gastritis, possibly a healing ulcer, without active bleeding present today, biopsied -- proximal esophageal web, widely patent -- no fareed bleeding nor additional source of anemia/melena seen. Bx : reactive gastropathy. CLN - None previously. Possible CAP  Leukocytosis  Hx of CVA - on plavix and ASA  Hyperlipidemia    Plan:   EGD today with Dr Herbert Patient at [phone] - last dose of plavix 22   Maintain NPO  BID PPI  Follow H/H - transfuse for Hgb < 7.0  Hold Plavix and ASA  6. Rest per primary team.     Plan discussed with Dr Herbert Patient. Thank you for consultation. Subjective:     HISTORY OF PRESENT ILLNESS:     Falguni Stacy is an 67 y.o.  male who we are asked to see for complaint of anemia and coffee ground emesis. Medical history includes CVA on plavix and ASA - last dose 22, HTN, and hyper lipidemia. Pt presented to ER for coffee ground emesis x 1 and dark brown/black liquid stool x 1.  He was having fatigue and lightheadedness and on arrival was noted to be hypotensive, tachycardic, and elevated lactic acid. He has a hx of anemia and possible GI black in 7/2022 and noted to have gastritis on EGD. Pt states last time his epigastric area did not hurt, but does this time. No more stool since being at home. He is feeling better now and wants to eat. No previous colonoscopy. NO family hx of CRC. Past Medical History:   Diagnosis Date    Essential hypertension     Hyperlipidemia     Neurological disorder     Stroke [facility])       Past Surgical History:   Procedure Laterality Date    HX HERNIA REPAIR  2009    HX HERNIA REPAIR  2007    MS ABDOMEN SURGERY PROC UNLISTED  5199,4697    colostomy, reversal     Social History     Tobacco Use    Smoking status: Former    Smokeless tobacco: Never   Substance Use Topics    Alcohol use: Yes     Comment: rarely      Family History   Problem Relation Age of Onset    Cancer Mother     Heart Attack Father       Allergies   Allergen Reactions    Pcn [Penicillins] Unknown (comments)    Sulfa (Sulfonamide Antibiotics) Other (comments)      Prior to Admission medications    Medication Sig Start Date End Date Taking? Authorizing Provider   b complex vitamins tablet Take 1 Tablet by mouth daily as needed. Yes Provider, Historical   coenzyme q10 10 mg cap Take  by mouth. Yes Provider, Historical   atorvastatin (LIPITOR) 40 mg tablet TAKE 1 TABLET BY MOUTH DAILY. REPEAT FASTING LABS 3-2-2022 BEFORE FURTHER REFILLS  Patient taking differently: Take 40 mg by mouth nightly. 2/15/22  Yes Lucie Dash MD   clopidogreL (PLAVIX) 75 mg tab Take 1 Tablet by mouth daily. 2/14/22  Yes Lucie Dash MD   losartan (COZAAR) 25 mg tablet Take 1 Tablet by mouth daily. 2/14/22  Yes Lucie Dash MD   aspirin delayed-release 81 mg tablet Take 81 mg by mouth nightly. 12/2/21  Yes Provider, Historical   cyanocobalamin 1,000 mcg tablet Take 1,000 mcg by mouth.    Yes Provider, Historical       Patient Active Problem List   Diagnosis Code    Acute blood loss anemia D62 Upper GI bleed K92.2    [facility]eps[facility] ([facility] A41.9    Hypotension I95.9       REVIEW OF SYSTEMS:    Constitutional: negative fever, negative chills, negative weight loss  Eyes:   negative visual changes  ENT:   negative sore throat, tongue or lip swelling   Respiratory:  negative cough, negative dyspnea  Cards:  negative for chest pain, palpitations, lower extremity edema  GI:   See HPI  :  negative for frequency, dysuria  Integument:  negative for rash and pruritus  Heme:  negative for easy bruising and gum/nose bleeding  Musculoskel: negative for myalgias,  back vivek\n  Neuro: negative for headaches, dizziness, vertigo  Psych: negative for feelings of anxiety, depression     Pertinent Positives: general fatigue/weakness      Objective:   VITALS:    Visit Vitals  BP (!) 141/62 (BP 1 Location: Left upper arm, BP Patient Position: At rest)   Pulse 77   Temp 98.4 °F (36.9 °C)   Resp 18   Ht 6' 2\" (1.88 m)   Wt 65.8 kg (145 lb)   SpO2 98%   BMI 18.62 kg/m²       PHYSICAL EXAM:   General:          Alert, WD, WN, cooperative, no distress, appears stated age. Head:               Normocephalic, without obvious abnormality, atraumatic. Eyes:               Conjunctivae clear and pale, anicteric sclerae. Pupils are equal  Nose:               Nares normal.  Throat:             Lips, mucosa, and tongue normal.  Neck:               Supple, symmetrical,  no adenopathy, thyroid: non tender  Back:               Symmetric,  No CVA tenderness. Lungs:             CTA bilaterally. No wheezing/rhonchi/rales. Chest wall:      No tenderness or deformity. Heart:              Regular rate and rhythm,  no murmur, rub or gallop. Abdomen:        Soft, +mild epigastric discomfort. Not distended. Bowel sounds normal. No masses  Extremities:     Atraumatic, No cyanosis. No edema. Skin:                Texture, turgor normal. No rashes/lesions/jaundice  Lymph:            Cervical, supraclavicular normal.  Psych:             Good insight. Not depressed. Not anxious or agitated. Neurologic:      EOMs intact. No facial asymmetry. No aphasia or slurred speech. Normal  strength, A/O X 3. LAB DATA REVIEWED:    Recent Results (from the past 24 hour(s))   EKG, 12 LEAD, INITIAL    Collection Time: 09/25/22  3:55 PM   Result Value Ref Range    Ventricular Rate 119 BPM    Atrial Rate 119 BPM    P-R Interval 128 ms    QRS Duration 82 ms    Q-T Interval 310 ms    QTC Calculation (Bezet) 436 ms    Calculated P Axis 92 degrees    Calculated R Axis 81 degrees    Calculated T Axis 85 degrees    Diagnosis       Sinus tachycardia  When compared with ECG of 22-JUL-2022 22:57,  Vent. rate has increased BY  39 BPM  Nonspecific T wave abnormality no longer evident in Inferior leads  T wave inversion no longer evident in Lateral leads     POC LACTIC ACID    Collection Time: 09/25/22  4:05 PM   Result Value Ref Range    Lactic Acid (POC) 2.18 (HH) 0.40 - 2.00 mmol/L   TROPONIN-HIGH SENSITIVITY    Collection Time: 09/25/22  4:15 PM   Result Value Ref Range    Troponin-High Sensitivity 27 0 - 76 ng/L   CBC WITH AUTOMATED DIFF    Collection Time: 09/25/22  4:15 PM   Result Value Ref Range    WBC 13.9 (H) 4.1 - 11.1 K/uL    RBC 3.20 (L) 4.10 - 5.70 M/uL    HGB 7.4 (L) 12.1 - 17.0 g/dL    HCT 24.7 (L) 36.6 - 50.3 %    MCV 77.2 (L) 80.0 - 99.0 FL    MCH 23.1 (L) 26.0 - 34.0 PG    MCHC 30.0 30.0 - 36.5 g/dL    RDW 19.2 (H) 11.5 - 14.5 %    PLATELET 035 (H) 822 - 400 K/uL    MPV 8.9 8.9 - 12.9 FL    NRBC 0.0 0  WBC    ABSOLUTE NRBC 0.00 0.00 - 0.01 K/uL    NEUTROPHILS 86 (H) 32 - 75 %    LYMPHOCYTES 7 (L) 12 - 49 %    MONOCYTES 5 5 - 13 %    EOSINOPHILS 0 0 - 7 %    BASOPHILS 1 0 - 1 %    IMMATURE GRANULOCYTES 1 (H) 0.0 - 0.5 %    ABS. NEUTROPHILS 12.1 (H) 1.8 - 8.0 K/UL    ABS. LYMPHOCYTES 1.0 0.8 - 3.5 K/UL    ABS. MONOCYTES 0.6 0.0 - 1.0 K/UL    ABS. EOSINOPHILS 0.0 0.0 - 0.4 K/UL    ABS. BASOPHILS 0.1 0.0 - 0.1 K/UL    ABS. IMM.  GRANS. 0.1 (H) 0.00 - 0.04 K/UL DF AUTOMATED     METABOLIC PANEL, COMPREHENSIVE    Collection Time: 09/25/22  4:15 PM   Result Value Ref Range    Sodium 138 136 - 145 mmol/L    Potassium 4.4 3.5 - 5.1 mmol/L    Chloride 106 97 - 108 mmol/L    CO2 23 21 - 32 mmol/L    Anion gap 9 5 - 15 mmol/L    Glucose 133 (H) 65 - 100 mg/dL    BUN 41 (H) 6 - 20 MG/DL    Creatinine 1.08 0.70 - 1.30 MG/DL    BUN/Creatinine ratio 38 (H) 12 - 20      GFR est AA >60 >60 ml/min/1.73m2    GFR est non-AA >60 >60 ml/min/1.73m2    Calcium 8.9 8.5 - 10.1 MG/DL    Bilirubin, total 0.5 0.2 - 1.0 MG/DL    ALT (SGPT) 21 12 - 78 U/L    AST (SGOT) 11 (L) 15 - 37 U/L    Alk.  phosphatase 59 45 - 117 U/L    Protein, total 6.7 6.4 - 8.2 g/dL    Albumin 2.8 (L) 3.5 - 5.0 g/dL    Globulin 3.9 2.0 - 4.0 g/dL    A-G Ratio 0.7 (L) 1.1 - 2.2     CULTURE, BLOOD, PAIRED    Collection Time: 09/25/22  4:15 PM    Specimen: Blood   Result Value Ref Range    Special Requests: NO SPECIAL REQUESTS      Culture result: NO GROWTH AFTER 15 HOURS     TYPE & SCREEN    Collection Time: 09/25/22  4:15 PM   Result Value Ref Range    Crossmatch Expiration 09/28/2022,2359     ABO/Rh(D) Boogie Maillard POSITIVE     Antibody screen NEG     Unit number [id number]     Blood component type [facility]     Unit division 00     Status of unit ISSUED     Crossmatch result Compatible     Unit number [id number]     Blood component type [facility]     Unit division 00     Status of unit ISSUED     Crossmatch result Compatible    PROTHROMBIN TIME + INR    Collection Time: 09/25/22  4:15 PM   Result Value Ref Range    INR 1.1 0.9 - 1.1      Prothrombin time 11.2 (H) 9.0 - 11.1 sec   URINALYSIS W/ REFLEX CULTURE    Collection Time: 09/25/22  5:16 PM    Specimen: Urine   Result Value Ref Range    Color DARK YELLOW      Appearance CLEAR CLEAR      Specific gravity 1.018      pH (UA) 6.0 5.0 - 8.0      Protein Negative NEG mg/dL    Glucose Negative NEG mg/dL    Ketone TRACE (A) NEG mg/dL    Bilirubin Negative NEG      Blood Negative NEG Urobilinogen 1.0 0.2 - 1.0 EU/dL    Nitrites Negative NEG      Leukocyte Esterase Negative NEG      UA:UC IF INDICATED CULTURE NOT INDICATED BY UA RESULT CNI      WBC 0-4 0 - 4 /hpf    RBC 0-5 0 - 5 /hpf    Epithelial cells FEW FEW /lpf    Bacteria Negative NEG /hpf    Hyaline cast 2-5 0 - 2 /lpf   OCCULT BLOOD, STOOL    Collection Time: 09/25/22  5:16 PM   Result Value Ref Range    Occult blood, stool Negative NEG     POC LACTIC ACID    Collection Time: 09/25/22  5:44 PM   Result Value Ref Range    Lactic Acid (POC) 1.72 0.40 - 2.00 mmol/L   HGB & HCT    Collection Time: 09/25/22 10:41 PM   Result Value Ref Range    HGB 5.7 (LL) 12.1 - 17.0 g/dL    HCT 18.7 (L) 36.6 - 50.3 %   RBC, ALLOCATE    Collection Time: 09/25/22 11:45 PM   Result Value Ref Range    HISTORY CHECKED? Historical check performed    METABOLIC PANEL, BASIC    Collection Time: 09/26/22 10:20 AM   Result Value Ref Range    Sodium 140 136 - 145 mmol/L    Potassium 3.8 3.5 - 5.1 mmol/L    Chloride 111 (H) 97 - 108 mmol/L    CO2 23 21 - 32 mmol/L    Anion gap 6 5 - 15 mmol/L    Glucose 91 65 - 100 mg/dL    BUN 28 (H) 6 - 20 MG/DL    Creatinine 0.79 0.70 - 1.30 MG/DL    BUN/Creatinine ratio 35 (H) 12 - 20      GFR est AA >60 >60 ml/min/1.73m2    GFR est non-AA >60 >60 ml/min/1.73m2    Calcium 8.4 (L) 8.5 - 10.1 MG/DL   CBC WITH AUTOMATED DIFF    Collection Time: 09/26/22 10:20 AM   Result Value Ref Range    WBC 10.5 4.1 - 11.1 K/uL    RBC 3.53 (L) 4.10 - 5.70 M/uL    HGB 8.7 (L) 12.1 - 17.0 g/dL    HCT 27.8 (L) 36.6 - 50.3 %    MCV 78.8 (L) 80.0 - 99.0 FL    MCH 24.6 (L) 26.0 - 34.0 PG    MCHC 31.3 30.0 - 36.5 g/dL    RDW 18.9 (H) 11.5 - 14.5 %    PLATELET 023 058 - 740 K/uL    MPV 8.8 (L) 8.9 - 12.9 FL    NRBC 0.0 0  WBC    ABSOLUTE NRBC 0.00 0.00 - 0.01 K/uL    NEUTROPHILS 75 32 - 75 %    LYMPHOCYTES 14 12 - 49 %    MONOCYTES 9 5 - 13 %    EOSINOPHILS 1 0 - 7 %    BASOPHILS 1 0 - 1 %    IMMATURE GRANULOCYTES 0 0.0 - 0.5 %    ABS.  NEUTROPHILS 7.9 1.8 - 8.0 K/UL    ABS. LYMPHOCYTES 1.5 0.8 - 3.5 K/UL    ABS. MONOCYTES 0.9 0.0 - 1.0 K/UL    ABS. EOSINOPHILS 0.1 0.0 - 0.4 K/UL    ABS. BASOPHILS 0.1 0.0 - 0.1 K/UL    ABS. IMM. GRANS. 0.0 0.00 - 0.04 K/UL    DF AUTOMATED         IMAGING RESULTS:  I have personally reviewed the imaging reports      Total time spent with patient: 50 minutes ________________________________________________________________________  Care Plan discussed with:  Patient y   Family     RN y              Consultant:       CT  9/26/2022:  ________________________________________________________________________    ___________________________________________________  Consulting Provider:  Jina Montejo NP      9/26/2022  12:20 PM

## 2022-09-26 NOTE — ANESTHESIA PREPROCEDURE EVALUATION
Relevant Problems   HEMATOLOGY   (+) Acute blood loss anemia       Anesthetic History   No history of anesthetic complications            Review of Systems / Medical History  Patient summary reviewed, nursing notes reviewed and pertinent labs reviewed    Pulmonary  Within defined limits                 Neuro/Psych       CVA  TIA     Cardiovascular    Hypertension          Hyperlipidemia    Exercise tolerance: >4 METS  Comments: EKG 9/25/22: Sinus tachycardia   When compared with ECG of 22-JUL-2022 22:57,   Vent.  rate has increased BY  39 BPM   Nonspecific T wave abnormality no longer evident in Inferior leads   T wave inversion no longer evident in Lateral leads   EF 55-60%   GI/Hepatic/Renal  Within defined limits              Endo/Other        Anemia     Other Findings   Comments: Hb 8.7           Physical Exam    Airway  Mallampati: II  TM Distance: 4 - 6 cm  Neck ROM: normal range of motion   Mouth opening: Normal     Cardiovascular  Regular rate and rhythm,  S1 and S2 normal,  no murmur, click, rub, or gallop  Rhythm: regular  Rate: normal         Dental    Dentition: Full upper dentures and Full lower dentures     Pulmonary  Breath sounds clear to auscultation               Abdominal  GI exam deferred       Other Findings            Anesthetic Plan    ASA: 3  Anesthesia type: total IV anesthesia and general          Induction: Intravenous  Anesthetic plan and risks discussed with: Patient      Propofol MAC

## 2022-09-26 NOTE — PROCEDURES
[facility]                   Endoscopic Gastroduodenoscopy Procedure Note      9/26/2022  Oneil Webb  1950  [id number]    Procedure: Endoscopic Gastroduodenoscopy with biopsy    Indication:  hematemesis with acute blood loss anemia      Pre-operative Diagnosis: see indication above    Post-operative Diagnosis: see findings below    : SILVIO Yepez MD    Referring Provider:  Enriqueta Hodgkins, MD      Anesthesia/Sedation:  MAC anesthesia    Airway assessment: No airway problems anticipated    Pre-Procedural Exam:      Airway: clear, no airway problems anticipated  Heart: RRR, without gallops or rubs  Lungs: clear bilaterally without wheezes, crackles, or rhonchi  Abdomen: soft, nontender, nondistended, bowel sounds present  Mental Status: awake, alert and oriented to person, place and time       Procedure Details     After infomed consent was obtained for the procedure, with all risks and benefits of procedure explained the patient was taken to the endoscopy suite and placed in the left lateral decubitus position. Following sequential administration of sedation as per above, the endoscope was inserted into the mouth and advanced under direct vision to second portion of the duodenum. A careful inspection was made as the gastroscope was withdrawn, including a retroflexed view of the proximal stomach; findings and interventions are described below. Findings:   Esophagus:normal  Stomach: No blood in stomach. In the prepyloric antrum is a 4-5 mm ulcer deep in an area of gastritis and raised fold. Biopsies obtained  Duodenum: normal    Therapies:  biopsy of stomach antrum    Specimens: gastric antral biopsies           Complications:   None; patient tolerated the procedure well. EBL:  None. Impression:      Esophagus normal  Acute gastric antral ulcer without bleeding  3.  Duodenum normal  Recommendations:  -Acid suppression with a proton pump inhibitor---pantropazole 40 mg BID., -Await pathology. , -Hold aspirin and plavix for at least 7 days. Add sucralfate 1 gram qid  Regular diet.  Repeat EGD in 2 months to assure healing    Marcelino Alcazar MD9/26/2022

## 2022-09-27 VITALS
HEART RATE: 78 BPM | HEIGHT: 74 IN | OXYGEN SATURATION: 99 % | WEIGHT: 141.9 LBS | BODY MASS INDEX: 18.21 KG/M2 | TEMPERATURE: 97.4 F | DIASTOLIC BLOOD PRESSURE: 65 MMHG | RESPIRATION RATE: 17 BRPM | SYSTOLIC BLOOD PRESSURE: 123 MMHG

## 2022-09-27 LAB
ABO + RH BLD: NORMAL
ATRIAL RATE: 119 BPM
BLD PROD TYP BPU: NORMAL
BLD PROD TYP BPU: NORMAL
BLOOD GROUP ANTIBODIES SERPL: NORMAL
BPU ID: NORMAL
BPU ID: NORMAL
CALCULATED P AXIS, ECG09: 92 DEGREES
CALCULATED R AXIS, ECG10: 81 DEGREES
CALCULATED T AXIS, ECG11: 85 DEGREES
CROSSMATCH RESULT,%XM: NORMAL
CROSSMATCH RESULT,%XM: NORMAL
DIAGNOSIS, 93000: NORMAL
HCT VFR BLD AUTO: 23.7 % (ref 36.6–50.3)
HCT VFR BLD AUTO: 24.4 % (ref 36.6–50.3)
HGB BLD-MCNC: 7.1 G/DL (ref 12.1–17)
HGB BLD-MCNC: 7.6 G/DL (ref 12.1–17)
P-R INTERVAL, ECG05: 128 MS
Q-T INTERVAL, ECG07: 310 MS
QRS DURATION, ECG06: 82 MS
QTC CALCULATION (BEZET), ECG08: 436 MS
SPECIMEN EXP DATE BLD: NORMAL
STATUS OF UNIT,%ST: NORMAL
STATUS OF UNIT,%ST: NORMAL
UNIT DIVISION, %UDIV: 0
UNIT DIVISION, %UDIV: 0
VENTRICULAR RATE, ECG03: 119 BPM

## 2022-09-27 PROCEDURE — 74011250636 HC RX REV CODE- 250/636: Performed by: HOSPITALIST

## 2022-09-27 PROCEDURE — 74011000250 HC RX REV CODE- 250: Performed by: HOSPITALIST

## 2022-09-27 PROCEDURE — 74011250636 HC RX REV CODE- 250/636: Performed by: INTERNAL MEDICINE

## 2022-09-27 PROCEDURE — 74011250637 HC RX REV CODE- 250/637: Performed by: INTERNAL MEDICINE

## 2022-09-27 PROCEDURE — 85018 HEMOGLOBIN: CPT

## 2022-09-27 PROCEDURE — 74011000250 HC RX REV CODE- 250: Performed by: INTERNAL MEDICINE

## 2022-09-27 PROCEDURE — 36415 COLL VENOUS BLD VENIPUNCTURE: CPT

## 2022-09-27 PROCEDURE — 30233N1 TRANSFUSION OF NONAUTOLOGOUS RED BLOOD CELLS INTO PERIPHERAL VEIN, PERCUTANEOUS APPROACH: ICD-10-PCS | Performed by: INTERNAL MEDICINE

## 2022-09-27 PROCEDURE — C9113 INJ PANTOPRAZOLE SODIUM, VIA: HCPCS | Performed by: HOSPITALIST

## 2022-09-27 RX ORDER — SUCRALFATE 1 G/1
1 TABLET ORAL
Qty: 56 TABLET | Refills: 0 | Status: ON HOLD | OUTPATIENT
Start: 2022-09-27 | End: 2022-10-08 | Stop reason: SDUPTHER

## 2022-09-27 RX ORDER — PANTOPRAZOLE SODIUM 40 MG/1
40 GRANULE, DELAYED RELEASE ORAL
Qty: 60 EACH | Refills: 0 | Status: ON HOLD | OUTPATIENT
Start: 2022-09-27 | End: 2022-10-08 | Stop reason: SDUPTHER

## 2022-09-27 RX ADMIN — SUCRALFATE 1 G: 1 TABLET ORAL at 09:09

## 2022-09-27 RX ADMIN — SUCRALFATE 1 G: 1 TABLET ORAL at 11:43

## 2022-09-27 RX ADMIN — SODIUM CHLORIDE 100 ML/HR: 9 INJECTION, SOLUTION INTRAVENOUS at 09:11

## 2022-09-27 RX ADMIN — POLYMYXIN B SULFATE, BACITRACIN ZINC, NEOMYCIN SULFATE: 5000; 3.5; 4 OINTMENT TOPICAL at 08:51

## 2022-09-27 RX ADMIN — SODIUM CHLORIDE, PRESERVATIVE FREE 10 ML: 5 INJECTION INTRAVENOUS at 06:37

## 2022-09-27 RX ADMIN — SODIUM CHLORIDE 40 MG: 9 INJECTION, SOLUTION INTRAMUSCULAR; INTRAVENOUS; SUBCUTANEOUS at 09:09

## 2022-09-27 RX ADMIN — SODIUM CHLORIDE, PRESERVATIVE FREE 10 ML: 5 INJECTION INTRAVENOUS at 06:38

## 2022-09-27 NOTE — PROGRESS NOTES
1921 Bedside and Verbal shift change report given to She (oncoming nurse) by Desirae Laurent (offgoing nurse). Report included the following information SBAR, Procedure Summary, MAR, Recent Results, and Cardiac Rhythm Sinus Rhythm . Problem: Falls - Risk of  Goal: *Absence of Falls  Description: Document [place] Fall Risk and appropriate interventions in the flowsheet. Outcome: Progressing Towards Goal  Note: Fall Risk Interventions:  Mobility Interventions: Bed/chair exit alarm, Patient to call before getting OOB  Medication Interventions: Patient to call before getting OOB, Teach patient to arise slowly  Elimination Interventions: Call light in reach, Urinal in reach, Patient to call for help with toileting needs    Problem: Pressure Injury - Risk of  Goal: *Prevention of pressure injury  Description: Document Joseph Scale and appropriate interventions in the flowsheet. Note: Pressure Injury Interventions: Activity Interventions: Chair cushion, Increase time out of bed, PT/OT evaluation  Mobility Interventions: Chair cushion, Float heels, HOB 30 degrees or less, PT/OT evaluation  Nutrition Interventions: Document food/fluid/supplement intake, Offer support with meals,snacks and hydration       End of Shift Note    Bedside shift change report given to Denys (oncoming nurse) by Aaron Amos. She Cedeno RN (offgoing nurse).   Report included the following information SBAR, Procedure Summary, MAR, Recent Results, and Cardiac Rhythm Sinus Rhythm    Shift worked: 7p-7a     Shift summary and any significant changes:    H&H q6 - due at 1000am     Concerns for physician to address: Hgb downtrending - latest 7.1     [facility]          Activity:  Activity Level: Bed Rest  Number times ambulated in hallways past shift: 0  Number of times OOB to chair past shift: 0    Cardiac:   Cardiac Monitoring: Yes      Cardiac Rhythm: Sinus Rhythm    Access:  Current line(s): PIV     Genitourinary:   Urinary status: voiding    Respiratory:   O2 Device: None (Room air)  Chronic home O2 use?: NO  Incentive spirometer at bedside: NO       GI:  Last Bowel Movement Date: 09/25/22  Current diet:  ADULT DIET Regular  Passing flatus: YES  Tolerating current diet: YES       Pain Management:   Patient states pain is manageable on current regimen: YES    Skin:  Joseph Score: 19  Interventions: increase time out of bed, limit briefs, internal/external urinary devices, and nutritional support     Patient Safety:  Fall Score: Total Score: 3  Interventions: gripper socks and pt to call before getting OOB  High Fall Risk: Yes    Length of Stay:  Expected LOS: - - -  Actual LOS: 2      Ma.  Yeison Cordero RN

## 2022-09-27 NOTE — PROGRESS NOTES
Transition of Care Plan:Patient is being discharged today and his wife is on the way and should be here in one hour. RUR:17%    Disposition:Home with wife    Follow up appointments:Placed on AVS    DME needed:None    Transportation at [address] or means to access home:   Wife has keys       IM Medicare Letter:Given on 9/27/22    Is patient a [place] and connected with the [address]? No, he states he is not connected. If yes, was [employer] transfer form completed and [facility] notified? N/A    Caregiver Contact:Wife    Discharge Caregiver contacted prior to discharge? Wife has been contacted. Care Conference needed?: No    Reason for Admission:   Patient came into ed for vomiting blood                    RUR Score:  17%                PCP: First and Last name:   Yaneli Duran MD     [facility] of Practice: [facility]     Are you a current patient: Yes/No: Yes     Approximate date of last visit: 2 weeks ago     Can you participate in a virtual visit if needed: Yes    Do you (patient/family) have any concerns for transition/discharge? No                Plan for utilizing home health:   He has politely declined states he does not need home health services. He has not had home health or rehab services in the past.    Current Advanced Directive/Advance Care Plan:  Full Code  Advance Care Planning     General Advance Care Planning (ACP) Conversation      Date of Conversation: 9/27/22  Conducted with: Patient with Decision Making Capacity    Healthcare Decision Maker:   No healthcare decision makers have been documented.    Click here to complete [address] including selection of the Healthcare Decision Maker Relationship (ie \"Primary\")      Content/Action Overview:   DECLINED ACP conversation - will revisit periodically   Reviewed DNR/DNI and patient elects Full Code (Attempt Resuscitation)         Length of Voluntary ACP Conversation in minutes:  <16 minutes (Non-Billable)    Nirav Cardozo RN                Healthcare Decision Maker:   Click here to complete [address] including selection of the Healthcare Decision Maker Relationship (ie \"Primary\")              Spoke with patient and confirmed demographics and pcp information. Patient's physical address is [address] [place] [address]. He lives in one story home with his wife. He states he was independent prior to coming to the hospital. He drives. He does not use home oxygen or cpap machine. He is being discharged home today with his wife and will adhere to his follow up appointments. Janet Perera RN BSN CRM        [phone]

## 2022-09-27 NOTE — DISCHARGE INSTRUCTIONS
HOSPITALIST DISCHARGE INSTRUCTIONS    NAME: Tigre Bass   :  1950   MRN:  [id number]     Date/Time:  2022 10:04 AM    ADMIT DATE: 2022   DISCHARGE DATE: 2022     Hemorrhagic shock, POA resolved with IVF bolus 2.4L  Upper GI bleed with coffee ground emesis x 1 and dark stool x 1 with elevated BUN  Acute blood loss anemia, POA  Leukocytosis WBC 13.9. Hx streptococcus mitis bacteremia 2022  Hx stroke  Hyperlipidemia      -Start Taking aspirin and Plavix after 7 days. It is important that you take the medication exactly as they are prescribed. Keep your medication in the bottles provided by the pharmacist and keep a list of the medication names, dosages, and times to be taken in your wallet. Do not take other medications without consulting your doctor. What to do at Home    Recommended diet:  Low fat, Low cholesterol    Recommended activity: Activity as tolerated      If you have questions regarding the hospital related prescriptions or hospital related issues please call [address] [place] [address] office at . You can always direct your questions to your primary care doctor if you are unable to reach your hospital physician; your PCP works as an extension of your hospital doctor just like your hospital doctor is an extension of your PCP for your time at the hospital [facility], [facility])    If you experience any of the following symptoms then please call your primary care physician or return to the emergency room if you cannot get hold of your doctor:    Fever, chills, nausea, vomiting, or persistent diarrhea  Worsening weakness or new problems with your speech or balance  Dark stools or visible blood in your stools  New Leg swelling or shortness of breath as these could be signs of a clot    Additional Instructions:      Bring these papers with you to your follow up appointments.  The papers will help your doctors be sure to continue the care plan from the [facility].              Information obtained by :  I understand that if any problems occur once I am at home I am to contact my physician. I understand and acknowledge receipt of the instructions indicated above.                                                                                                                                            Physician's or R.N.'s Signature                                                                  Date/Time                                                                                                                                              Patient or Representative Signature

## 2022-09-27 NOTE — PROGRESS NOTES
GI PROGRESS NOTE  Reena Daniel, NP  [phone] NP in-hospital cell phone M-F until 4:30  After 5pm or on weekends, please call  for physician on call    NAME:Mick Blue :1950 JEROME:[id number]   ATTG: Dr Mary Lou Guillen PCP: Zahra Tarango MD  Date/Time:  2022 2:34 PM   Primary GI: Dr. Migel Guillen:   Antral Ulcer found on EGD  On Plavix and ASA 81mg daily - last dose 22  Acute Blood Loss Anemia - Hgb stable at 7.6 now  - No more stools per patient  - Mild epigastric discomfort - no additional NSAID use. EGD 22 : Esophagus normal, Acute gastric antral ulcer without bleeding, Duodenum normal  EGD 2022 : -- moderate focal gastritis, possibly a healing ulcer, without active bleeding present today, biopsied -- proximal esophageal web, widely patent -- no fareed bleeding nor additional source of anemia/melena seen. Bx : reactive gastropathy. CLN - Mick many years ago, hx of colostomy but had reversal 15 years ago. States he got a bag for twisted colon at the same time his gallbladder was removed. Possible CAP  Leukocytosis  Hx of CVA - on plavix and ASA  Hyperlipidemia    Plan:   1. Continue PPI BID for 8 weeks  2. Follow pathology  3. Hold ASA and plavix for 7 days. 4. Continue carafate 1gm QID. 5. DIscussed that patient could consider a colonoscopy for anemia and has not had one in a while, pt did not want to do one now or soon. Discussed it could be done as outpatient but he is over due. Verbalized understanding. 6. Rest per primary team.      Plan discussed with Dr Kevin Garner. Nothing further to add. Will sign off. Subjective:   Discussed with RN events overnight. Doing well and eating food. No pain.      Review of Systems:  Symptom Y/N Comments  Symptom Y/N Comments   Fever/Chills    Chest Pain     Cough    Headaches     Sputum    Joint Pain     SOB/URBAN    Pruritis/Rash     Tolerating Diet y   Other       Could NOT obtain due to:      Objective: VITALS:   Last 24hrs VS reviewed since prior progress note. Most recent are:  Visit Vitals  /65 (BP 1 Location: Right upper arm, BP Patient Position: Sitting; At rest)   Pulse 78   Temp 97.4 °F (36.3 °C)   Resp 17   Ht 6' 2\" (1.88 m)   Wt 64.4 kg (141 lb 14.4 oz)   SpO2 99%   BMI 18.22 kg/m²       Intake/Output Summary (Last 24 hours) at 9/27/2022 1434  Last data filed at 9/27/2022 1147  Gross per 24 hour   Intake 1638.33 ml   Output 2055 ml   Net -416.67 ml     PHYSICAL EXAM:  General: WD, WN. Alert, cooperative, no acute distress    HEENT: NC, Atraumatic. Anicteric sclerae. Lungs:  CTA Bilaterally. No Wheezing/Rhonchi/Rales. Heart:  Regular  rhythm,  No murmur, No Rubs, No Gallops  Abdomen: Soft, Non distended, Non tender. +Bowel sounds, no HSM  Extremities: No c/c/e  Neurologic:  Alert and oriented X 3. No acute neurological distress   Psych:   Good insight. Not anxious nor agitated. Lab and Radiology Data Reviewed: (see below)    Medications Reviewed: (see below)  PMH/SH reviewed - no change compared to H&P  ________________________________________________________________________  Total time spent with patient: 20 minutes ________________________________________________________________________  Care Plan discussed with:  Patient y   Family     RN y              Consultant: Valerie Hayward NP     Procedures: see electronic medical records for all procedures/Xrays and details which were not copied into this note but were reviewed prior to creation of Plan. LABS:  Recent Labs     09/27/22  0951 09/27/22  0405 09/26/22  2221 09/26/22  1020 09/25/22  2241 09/25/22  1615   WBC  --   --   --  10.5  --  13.9*   HGB 7.6* 7.1*   < > 8.7*   < > 7.4*   HCT 24.4* 23.7*   < > 27.8*   < > 24.7*   PLT  --   --   --  360  --  560*    < > = values in this interval not displayed.      Recent Labs     09/26/22  1020 09/25/22  1615    138   K 3.8 4.4   * 106   CO2 23 23   BUN 28* 41*   CREA 0.79 1. 08   GLU 91 133*   CA 8.4* 8.9     Recent Labs     09/25/22  1615   AP 59   TP 6.7   ALB 2.8*   GLOB 3.9     Recent Labs     09/25/22  1615   INR 1.1   PTP 11.2*      No results for input(s): FE, TIBC, PSAT, FERR in the last 72 hours. Lab Results   Component Value Date/Time    Folate 35.9 (H) 07/22/2022 11:26 PM     No results for input(s): PH, PCO2, PO2 in the last 72 hours. No results for input(s): CPK, CKMB in the last 72 hours.     No lab exists for component: TROPONINI  Lab Results   Component Value Date/Time    Color DARK YELLOW 09/25/2022 05:16 PM    Appearance CLEAR 09/25/2022 05:16 PM    Specific gravity 1.018 09/25/2022 05:16 PM    pH (UA) 6.0 09/25/2022 05:16 PM    Protein Negative 09/25/2022 05:16 PM    Glucose Negative 09/25/2022 05:16 PM    Ketone TRACE (A) 09/25/2022 05:16 PM    Bilirubin Negative 09/25/2022 05:16 PM    Urobilinogen 1.0 09/25/2022 05:16 PM    Nitrites Negative 09/25/2022 05:16 PM    Leukocyte Esterase Negative 09/25/2022 05:16 PM    Epithelial cells FEW 09/25/2022 05:16 PM    Bacteria Negative 09/25/2022 05:16 PM    WBC 0-4 09/25/2022 05:16 PM    RBC 0-5 09/25/2022 05:16 PM       MEDICATIONS:  Current Facility-Administered Medications   Medication Dose Route Frequency    neomycin-bacitracin-polymyxin (NEOSPORIN) ointment   Topical BID    0.9% sodium chloride infusion  100 mL/hr IntraVENous CONTINUOUS    sodium chloride (NS) flush 5-40 mL  5-40 mL IntraVENous Q8H    sodium chloride (NS) flush 5-40 mL  5-40 mL IntraVENous PRN    sucralfate (CARAFATE) tablet 1 g  1 g Oral AC&HS    cefTRIAXone (ROCEPHIN) 1 g in 0.9% sodium chloride (MBP/ADV) 50 mL MBP  1 g IntraVENous Q24H    azithromycin (ZITHROMAX) 500 mg in 0.9% sodium chloride 250 mL ([id number])  500 mg IntraVENous Q24H    sodium chloride (NS) flush 5-40 mL  5-40 mL IntraVENous Q8H    sodium chloride (NS) flush 5-40 mL  5-40 mL IntraVENous PRN    acetaminophen (TYLENOL) tablet 650 mg  650 mg Oral Q6H PRN    Or    acetaminophen (TYLENOL) suppository 650 mg  650 mg Rectal Q6H PRN    polyethylene glycol (MIRALAX) packet 17 g  17 g Oral DAILY PRN    ondansetron (ZOFRAN ODT) tablet 4 mg  4 mg Oral Q8H PRN    Or    ondansetron (ZOFRAN) injection 4 mg  4 mg IntraVENous Q6H PRN    0.9% sodium chloride infusion  100 mL/hr IntraVENous CONTINUOUS    pantoprazole (PROTONIX) 40 mg in 0.9% sodium chloride 10 mL injection  40 mg IntraVENous Q12H    atorvastatin (LIPITOR) tablet 40 mg  40 mg Oral QHS

## 2022-09-27 NOTE — PROGRESS NOTES
Problem: Falls - Risk of  Goal: *Absence of Falls  Description: Document Jesse Blight Fall Risk and appropriate interventions in the flowsheet.   Outcome: Resolved/Met

## 2022-09-27 NOTE — PROGRESS NOTES
Spiritual Care Partner Volunteer visited patient at [address] in [facility] PRO[facility] on 9/27/2022     Documented by: BAILEE Coffey   Paging Service [phone])

## 2022-09-27 NOTE — PROGRESS NOTES
Problem: Falls - Risk of  Goal: *Absence of Falls  Description: Document Judson Farrell Fall Risk and appropriate interventions in the flowsheet.   Outcome: Resolved/Met

## 2022-09-27 NOTE — PROGRESS NOTES
Problem: Falls - Risk of  Goal: *Absence of Falls  Description: Document Leidy Shouts Fall Risk and appropriate interventions in the flowsheet. Outcome: Resolved/Met     Problem: Patient Education: Go to Patient Education Activity  Goal: Patient/Family Education  Outcome: Resolved/Met     Problem: Pressure Injury - Risk of  Goal: *Prevention of pressure injury  Description: Document Joseph Scale and appropriate interventions in the flowsheet.   Outcome: Resolved/Met     Problem: Patient Education: Go to Patient Education Activity  Goal: Patient/Family Education  Outcome: Resolved/Met

## 2022-09-27 NOTE — PROGRESS NOTES
Pt post EGD tolerated well. Ate his first meal this pm without problem. Bedside report to next shift.

## 2022-09-27 NOTE — DISCHARGE SUMMARY
Hospitalist Discharge Summary     Patient ID:  Sha Ambrose  [id number]  89 y.o.  1950 9/25/2022    PCP on record: Reinaldo Bryan MD    Admit date: 9/25/2022  Discharge date and time: 9/27/2022    DISCHARGE DIAGNOSIS:    Hemorrhagic shock, POA resolved with IVF bolus 2.4L  Upper GI bleed with coffee ground emesis x 1 and dark stool x 1 with elevated BUN  Acute blood loss anemia, POA  Leukocytosis WBC 13.9. Hx streptococcus mitis bacteremia 7/2022  Hx stroke  Hyperlipidemia      CONSULTATIONS:  IP CONSULT TO HOSPITALIST  IP CONSULT TO GASTROENTEROLOGY    Excerpted HPI from H&P of Erasto Caceres MD:  Sha Ambrose is a 67 y.o. male with PMH CVA on aspirin/plavix, no residual deficit, hx of UGIB with hemorrhagic shock admitted to ICU in 7/2022 requiring 2 units PRBC, with EGD showing gastritis, also had strep mitis bacteremia, HTN presents with acute onset of nausea and vomiting x 1 with dark brown liquid and had 1 BM today with dark stool \"rich chocolate\" color. Associated with lightheadedness. No abdominal pain, chest pain. No cough, fever, chills. Currently not on any PPI. Code sepsis called in ER as patient hypotensive, tachycardic, lactic 2.18 but now felt to be due to GIB rather than infection. Patient given 2.4L LR, 500ml NS and hypotension resolved. No further vomiting or BM.         ______________________________________________________________________  DISCHARGE SUMMARY/HOSPITAL COURSE:  for full details see H&P, daily progress notes, labs, consult notes. Hemorrhagic shock, POA resolved with IVF bolus 2.4L  --BP 79/48 on presentation, lactic 2.1  -- Now blood pressure stabilized       Upper GI bleed with coffee ground emesis x 1 and dark stool x 1 with elevated BUN  Acute blood loss anemia, POA  --hgb 7.4, was 8.8 in 7/22.   Recheck H&H q6h and transfuse prn hgb <7.   -Hemoglobin 5.7 overnight, required 2 units PRBC  -Recheck hemoglobin stable    S/p EGD 9/26  Findings:   Esophagus:normal  Stomach: No blood in stomach. In the prepyloric antrum is a 4-5 mm ulcer deep in an area of gastritis and raised fold. Biopsies obtained  Duodenum: normal      GI recommends to hold DAPT for 7 days. Patient will be continued on PPI twice daily     Right upper lobe opacity concerning for possible CAP. Ruled out  Leukocytosis WBC 13.9. Hx streptococcus mitis bacteremia 7/2022  -- Blood culture negative, DC'd antibiotics     Infected sebaceous cyst below right eye  --abx with rocephin. Change to Augmentin at discharge  --refer to plastic surgeon outpatient     Hx stroke  --hold aspirin and plavix due to UGIB     Hyperlipidemia  --continue lipitor         _______________________________________________________________________  Patient seen and examined by me on discharge day. Pertinent Findings:  Gen:    Not in distress  Chest: Clear lungs  CVS:   Regular rhythm. No edema  Abd:  Soft, not distended, not tender  Neuro:  Alert,   _______________________________________________________________________  DISCHARGE MEDICATIONS:   Current Discharge Medication List        START taking these medications    Details   sucralfate (CARAFATE) 1 gram tablet Take 1 Tablet by mouth Before breakfast, lunch, dinner and at bedtime for 14 days. Qty: 56 Tablet, Refills: 0  Start date: 9/27/2022, End date: 10/11/2022      pantoprazole (PROTONIX) 40 mg granules for oral suspension Take 40 mg by mouth Before breakfast and dinner for 30 days. Qty: 60 Each, Refills: 0  Start date: 9/27/2022, End date: 10/27/2022           CONTINUE these medications which have NOT CHANGED    Details   b complex vitamins tablet Take 1 Tablet by mouth daily as needed. coenzyme q10 10 mg cap Take  by mouth. atorvastatin (LIPITOR) 40 mg tablet TAKE 1 TABLET BY MOUTH DAILY.  REPEAT FASTING LABS 3-2-2022 BEFORE FURTHER REFILLS  Qty: 90 Tablet, Refills: 3    Comments: **Patient requests 90 days supply**      losartan (COZAAR) 25 mg tablet Take 1 Tablet by mouth daily. Qty: 90 Tablet, Refills: 2      cyanocobalamin 1,000 mcg tablet Take 1,000 mcg by mouth. STOP taking these medications       clopidogreL (PLAVIX) 75 mg tab Comments:   Reason for Stopping:         aspirin delayed-release 81 mg tablet Comments:   Reason for Stopping:                 Patient Follow Up Instructions:    Activity: Activity as tolerated  Diet: Resume previous diet  Wound Care: None needed      Follow-up Information       Follow up With Specialties Details Why Contact Info    Libra Gómez MD Gastroenterology, Surgery Physician, Internal Medicine Physician Schedule an appointment as soon as possible for a visit in 1 week(s)      Aamir Tolliver MD Gastroenterology Schedule an appointment as soon as possible for a visit in 1 month(s)  [address] [facility]  [address] [facility]  [phone]            ________________________________________________________________    Risk of deterioration: Low    Condition at Discharge:  Stable  __________________________________________________________________    Disposition  Home with family, no needs    ____________________________________________________________________    Code Status: Full Code  ___________________________________________________________________      Total time in minutes spent coordinating this discharge (includes going over instructions, follow-up, prescriptions, and preparing report for sign off to her PCP) :  >30 minutes    Signed:  David Valles MD

## 2022-09-27 NOTE — PROGRESS NOTES
Attempted to schedule GASTRO follow up appointment. Sent a message to [facility] office to find patient an appointment. Gastro MD RN will call patient once an appt is available.  [phone]

## 2022-10-01 LAB
BACTERIA SPEC CULT: NORMAL
SERVICE CMNT-IMP: NORMAL

## 2022-10-05 ENCOUNTER — HOSPITAL ENCOUNTER (INPATIENT)
Age: 72
LOS: 3 days | Discharge: HOME | DRG: 378 | End: 2022-10-08
Attending: EMERGENCY MEDICINE | Admitting: FAMILY MEDICINE
Payer: MEDICARE

## 2022-10-05 DIAGNOSIS — K92.2 UPPER GI BLEED: Primary | ICD-10-CM

## 2022-10-05 LAB
ALBUMIN SERPL-MCNC: 2.8 G/DL (ref 3.5–5)
ALBUMIN/GLOB SERPL: 0.8 {RATIO} (ref 1.1–2.2)
ALP SERPL-CCNC: 57 U/L (ref 45–117)
ALT SERPL-CCNC: 18 U/L (ref 12–78)
ANION GAP SERPL CALC-SCNC: 5 MMOL/L (ref 5–15)
AST SERPL-CCNC: 10 U/L (ref 15–37)
ATRIAL RATE: 90 BPM
BASOPHILS # BLD: 0.1 K/UL (ref 0–0.1)
BASOPHILS NFR BLD: 1 % (ref 0–1)
BILIRUB SERPL-MCNC: 0.4 MG/DL (ref 0.2–1)
BUN SERPL-MCNC: 21 MG/DL (ref 6–20)
BUN/CREAT SERPL: 22 (ref 12–20)
CALCIUM SERPL-MCNC: 8.9 MG/DL (ref 8.5–10.1)
CALCULATED P AXIS, ECG09: 88 DEGREES
CALCULATED R AXIS, ECG10: 55 DEGREES
CALCULATED T AXIS, ECG11: 67 DEGREES
CHLORIDE SERPL-SCNC: 104 MMOL/L (ref 97–108)
CO2 SERPL-SCNC: 27 MMOL/L (ref 21–32)
CREAT SERPL-MCNC: 0.94 MG/DL (ref 0.7–1.3)
DIAGNOSIS, 93000: NORMAL
DIFFERENTIAL METHOD BLD: ABNORMAL
EOSINOPHIL # BLD: 0.1 K/UL (ref 0–0.4)
EOSINOPHIL NFR BLD: 1 % (ref 0–7)
ERYTHROCYTE [DISTWIDTH] IN BLOOD BY AUTOMATED COUNT: 19.9 % (ref 11.5–14.5)
GLOBULIN SER CALC-MCNC: 3.5 G/DL (ref 2–4)
GLUCOSE SERPL-MCNC: 110 MG/DL (ref 65–100)
HCT VFR BLD AUTO: 17.4 % (ref 36.6–50.3)
HEMOCCULT STL QL: POSITIVE
HGB BLD-MCNC: 5.1 G/DL (ref 12.1–17)
HISTORY CHECKED?,CKHIST: NORMAL
IMM GRANULOCYTES # BLD AUTO: 0.1 K/UL (ref 0–0.04)
IMM GRANULOCYTES NFR BLD AUTO: 1 % (ref 0–0.5)
LYMPHOCYTES # BLD: 1.4 K/UL (ref 0.8–3.5)
LYMPHOCYTES NFR BLD: 10 % (ref 12–49)
MCH RBC QN AUTO: 24.3 PG (ref 26–34)
MCHC RBC AUTO-ENTMCNC: 29.3 G/DL (ref 30–36.5)
MCV RBC AUTO: 82.9 FL (ref 80–99)
MONOCYTES # BLD: 1.2 K/UL (ref 0–1)
MONOCYTES NFR BLD: 9 % (ref 5–13)
NEUTS SEG # BLD: 10.6 K/UL (ref 1.8–8)
NEUTS SEG NFR BLD: 78 % (ref 32–75)
NRBC # BLD: 0 K/UL (ref 0–0.01)
NRBC BLD-RTO: 0 PER 100 WBC
P-R INTERVAL, ECG05: 142 MS
PLATELET # BLD AUTO: 472 K/UL (ref 150–400)
PLATELET COMMENTS,PCOM: ABNORMAL
PMV BLD AUTO: 8.8 FL (ref 8.9–12.9)
POTASSIUM SERPL-SCNC: 3.9 MMOL/L (ref 3.5–5.1)
PROT SERPL-MCNC: 6.3 G/DL (ref 6.4–8.2)
Q-T INTERVAL, ECG07: 370 MS
QRS DURATION, ECG06: 88 MS
QTC CALCULATION (BEZET), ECG08: 452 MS
RBC # BLD AUTO: 2.1 M/UL (ref 4.1–5.7)
RBC MORPH BLD: ABNORMAL
SODIUM SERPL-SCNC: 136 MMOL/L (ref 136–145)
VENTRICULAR RATE, ECG03: 90 BPM
WBC # BLD AUTO: 13.5 K/UL (ref 4.1–11.1)

## 2022-10-05 PROCEDURE — 86900 BLOOD TYPING SEROLOGIC ABO: CPT

## 2022-10-05 PROCEDURE — C9113 INJ PANTOPRAZOLE SODIUM, VIA: HCPCS | Performed by: INTERNAL MEDICINE

## 2022-10-05 PROCEDURE — 85025 COMPLETE CBC W/AUTO DIFF WBC: CPT

## 2022-10-05 PROCEDURE — 99285 EMERGENCY DEPT VISIT HI MDM: CPT

## 2022-10-05 PROCEDURE — 96374 THER/PROPH/DIAG INJ IV PUSH: CPT

## 2022-10-05 PROCEDURE — 74011000250 HC RX REV CODE- 250: Performed by: EMERGENCY MEDICINE

## 2022-10-05 PROCEDURE — P9016 RBC LEUKOCYTES REDUCED: HCPCS

## 2022-10-05 PROCEDURE — 74011250636 HC RX REV CODE- 250/636: Performed by: INTERNAL MEDICINE

## 2022-10-05 PROCEDURE — 36430 TRANSFUSION BLD/BLD COMPNT: CPT

## 2022-10-05 PROCEDURE — 74011250637 HC RX REV CODE- 250/637: Performed by: INTERNAL MEDICINE

## 2022-10-05 PROCEDURE — 30233N1 TRANSFUSION OF NONAUTOLOGOUS RED BLOOD CELLS INTO PERIPHERAL VEIN, PERCUTANEOUS APPROACH: ICD-10-PCS | Performed by: EMERGENCY MEDICINE

## 2022-10-05 PROCEDURE — 36415 COLL VENOUS BLD VENIPUNCTURE: CPT

## 2022-10-05 PROCEDURE — 86923 COMPATIBILITY TEST ELECTRIC: CPT

## 2022-10-05 PROCEDURE — C9113 INJ PANTOPRAZOLE SODIUM, VIA: HCPCS | Performed by: EMERGENCY MEDICINE

## 2022-10-05 PROCEDURE — 65270000029 HC RM PRIVATE

## 2022-10-05 PROCEDURE — 74011250636 HC RX REV CODE- 250/636: Performed by: EMERGENCY MEDICINE

## 2022-10-05 PROCEDURE — 74011000250 HC RX REV CODE- 250: Performed by: INTERNAL MEDICINE

## 2022-10-05 PROCEDURE — 93005 ELECTROCARDIOGRAM TRACING: CPT

## 2022-10-05 PROCEDURE — 80053 COMPREHEN METABOLIC PANEL: CPT

## 2022-10-05 PROCEDURE — 82272 OCCULT BLD FECES 1-3 TESTS: CPT

## 2022-10-05 RX ORDER — SODIUM CHLORIDE 0.9 % (FLUSH) 0.9 %
5-40 SYRINGE (ML) INJECTION EVERY 8 HOURS
Status: DISCONTINUED | OUTPATIENT
Start: 2022-10-05 | End: 2022-10-08 | Stop reason: HOSPADM

## 2022-10-05 RX ORDER — ACETAMINOPHEN 325 MG/1
650 TABLET ORAL
Status: DISCONTINUED | OUTPATIENT
Start: 2022-10-05 | End: 2022-10-08 | Stop reason: HOSPADM

## 2022-10-05 RX ORDER — ATORVASTATIN CALCIUM 40 MG/1
40 TABLET, FILM COATED ORAL
Status: DISCONTINUED | OUTPATIENT
Start: 2022-10-05 | End: 2022-10-08 | Stop reason: HOSPADM

## 2022-10-05 RX ORDER — ONDANSETRON 2 MG/ML
4 INJECTION INTRAMUSCULAR; INTRAVENOUS
Status: DISCONTINUED | OUTPATIENT
Start: 2022-10-05 | End: 2022-10-08 | Stop reason: HOSPADM

## 2022-10-05 RX ORDER — SODIUM CHLORIDE 9 MG/ML
100 INJECTION, SOLUTION INTRAVENOUS CONTINUOUS
Status: DISCONTINUED | OUTPATIENT
Start: 2022-10-05 | End: 2022-10-06

## 2022-10-05 RX ORDER — SUCRALFATE 1 G/1
1 TABLET ORAL
Status: DISCONTINUED | OUTPATIENT
Start: 2022-10-05 | End: 2022-10-08 | Stop reason: HOSPADM

## 2022-10-05 RX ORDER — ACETAMINOPHEN 650 MG/1
650 SUPPOSITORY RECTAL
Status: DISCONTINUED | OUTPATIENT
Start: 2022-10-05 | End: 2022-10-08 | Stop reason: HOSPADM

## 2022-10-05 RX ORDER — SODIUM CHLORIDE 0.9 % (FLUSH) 0.9 %
5-40 SYRINGE (ML) INJECTION AS NEEDED
Status: DISCONTINUED | OUTPATIENT
Start: 2022-10-05 | End: 2022-10-08 | Stop reason: HOSPADM

## 2022-10-05 RX ORDER — SODIUM CHLORIDE 9 MG/ML
250 INJECTION, SOLUTION INTRAVENOUS AS NEEDED
Status: DISCONTINUED | OUTPATIENT
Start: 2022-10-05 | End: 2022-10-08 | Stop reason: HOSPADM

## 2022-10-05 RX ORDER — ONDANSETRON 4 MG/1
4 TABLET, ORALLY DISINTEGRATING ORAL
Status: DISCONTINUED | OUTPATIENT
Start: 2022-10-05 | End: 2022-10-08 | Stop reason: HOSPADM

## 2022-10-05 RX ORDER — ONDANSETRON 2 MG/ML
4 INJECTION INTRAMUSCULAR; INTRAVENOUS
Status: DISCONTINUED | OUTPATIENT
Start: 2022-10-05 | End: 2022-10-05

## 2022-10-05 RX ORDER — POLYETHYLENE GLYCOL 3350 17 G/17G
17 POWDER, FOR SOLUTION ORAL DAILY PRN
Status: DISCONTINUED | OUTPATIENT
Start: 2022-10-05 | End: 2022-10-08 | Stop reason: HOSPADM

## 2022-10-05 RX ADMIN — SODIUM CHLORIDE 40 MG: 9 INJECTION, SOLUTION INTRAMUSCULAR; INTRAVENOUS; SUBCUTANEOUS at 23:38

## 2022-10-05 RX ADMIN — ATORVASTATIN CALCIUM 40 MG: 40 TABLET, FILM COATED ORAL at 23:37

## 2022-10-05 RX ADMIN — SUCRALFATE 1 G: 1 TABLET ORAL at 23:37

## 2022-10-05 RX ADMIN — SODIUM CHLORIDE 80 MG: 9 INJECTION, SOLUTION INTRAMUSCULAR; INTRAVENOUS; SUBCUTANEOUS at 14:33

## 2022-10-05 RX ADMIN — SODIUM CHLORIDE, PRESERVATIVE FREE 10 ML: 5 INJECTION INTRAVENOUS at 23:39

## 2022-10-05 NOTE — CONSENT
Informed Consent for Blood Component Transfusion Note    I have discussed with the patient the rationale for blood component transfusion; its benefits in treating or preventing fatigue, organ damage, or death; and its risk which includes mild transfusion reactions, rare risk of blood borne infection, or more serious but rare reactions. I have discussed the alternatives to transfusion, including the risk and consequences of not receiving transfusion. The patient had an opportunity to ask questions and had agreed to proceed with transfusion of blood components.     Electronically signed by Frieda Oconnor MD on 10/5/22 at 4:58 PM

## 2022-10-05 NOTE — ED PROVIDER NOTES
EMERGENCY DEPARTMENT HISTORY AND PHYSICAL EXAM      Date: 10/5/2022  Patient Name: Nasra Cifuentes  Patient Age and Sex: 67 y.o. male     History of Presenting Illness     Chief Complaint   Patient presents with    Abnormal Lab Results     Referred to ER by PCP d/t hgb of 6.0 from blood drawn yesterday. Hx of GI bleeds, not taking any blood thinners. Pt denied chest pain, SOB, bloody stools, or dizziness. History Provided By: Patient    HPI: Nasra Cifuentes is a 72-year-old presenting with low hemoglobin. Patient was sent by PCP for hemoglobin of 6.0 from blood draw yesterday. He does report recent dark tarry stools for the past few days. 1-2 bowel movements a day, not large-volume. He reports mild epigastric discomfort without pain. Nonradiating this is persistent not progressive. He denies chest pain nausea vomiting or loose stools. Denies fatigue, dyspnea, syncope or near syncope. He denies any associated dizziness or lightheadedness. There are no other complaints, changes, or physical findings at this time. PCP: Laure Hernandez MD    No current facility-administered medications on file prior to encounter. Current Outpatient Medications on File Prior to Encounter   Medication Sig Dispense Refill    sucralfate (CARAFATE) 1 gram tablet Take 1 Tablet by mouth Before breakfast, lunch, dinner and at bedtime for 14 days. 56 Tablet 0    pantoprazole (PROTONIX) 40 mg granules for oral suspension Take 40 mg by mouth Before breakfast and dinner for 30 days. 60 Each 0    b complex vitamins tablet Take 1 Tablet by mouth daily as needed. coenzyme q10 10 mg cap Take  by mouth. atorvastatin (LIPITOR) 40 mg tablet TAKE 1 TABLET BY MOUTH DAILY. REPEAT FASTING LABS 3-2-2022 BEFORE FURTHER REFILLS (Patient taking differently: Take 40 mg by mouth nightly.) 90 Tablet 3    losartan (COZAAR) 25 mg tablet Take 1 Tablet by mouth daily.  90 Tablet 2    cyanocobalamin 1,000 mcg tablet Take 1,000 mcg by mouth. Past History     Past Medical History:  Past Medical History:   Diagnosis Date    Essential hypertension     Hyperlipidemia     Neurological disorder     Stroke [facility])        Past Surgical History:  Past Surgical History:   Procedure Laterality Date    HX HERNIA REPAIR  2009    HX HERNIA REPAIR  2007    [place] ABDOMEN SURGERY [address] UNLISTED  [id number]    colostomy, reversal       Family History:  Family History   Problem Relation Age of Onset    Cancer Mother     Heart Attack Father        Social History:  Social History     Tobacco Use    Smoking status: Former    Smokeless tobacco: Never   Vaping Use    Vaping Use: Never used   Substance Use Topics    Alcohol use: Yes     Comment: rarely     Allergies: Allergies   Allergen Reactions    Pcn [Penicillins] Unknown (comments)    Sulfa (Sulfonamide Antibiotics) Other (comments)         Review of Systems   Review of Systems   Constitutional:  Negative for chills and fever. HENT:  Negative for congestion and rhinorrhea. Respiratory:  Negative for shortness of breath. Cardiovascular:  Negative for chest pain. Gastrointestinal:  Negative for abdominal pain, diarrhea, nausea and vomiting. Positive for melena   Genitourinary:  Negative for dysuria and frequency. Musculoskeletal:  Negative for myalgias. Skin:  Negative for rash. Neurological:  Negative for weakness and numbness. All other systems reviewed and are negative. Physical Exam   Physical Exam  Vitals and nursing note reviewed. Constitutional:       Comments: Thin appearing   HENT:      Head: Normocephalic and atraumatic. Mouth/Throat:      Mouth: Mucous membranes are moist.   Eyes:      Comments: Pale conjunctiva   Cardiovascular:      Rate and Rhythm: Normal rate and regular rhythm. Pulses: Normal pulses. Pulmonary:      Effort: Pulmonary effort is normal.   Abdominal:      General: Abdomen is flat. Palpations: Abdomen is soft. Tenderness:  There is no abdominal tenderness. Genitourinary:     Comments: Black stool on rectal exam  Musculoskeletal:         General: No deformity. Skin:     General: Skin is warm and dry. Coloration: Skin is pale. Neurological:      Mental Status: He is alert and oriented to person, place, and time. Mental status is at baseline. Psychiatric:         Behavior: Behavior normal.         Thought Content: Thought content normal.        Diagnostic Study Results     Labs -     Recent Results (from the past 12 hour(s))   EKG, 12 LEAD, INITIAL    Collection Time: 10/05/22 12:41 PM   Result Value Ref Range    Ventricular Rate 90 BPM    Atrial Rate 90 BPM    P-R Interval 142 ms    QRS Duration 88 ms    Q-T Interval 370 ms    QTC Calculation (Bezet) 452 ms    Calculated P Axis 88 degrees    Calculated R Axis 55 degrees    Calculated T Axis 67 degrees    Diagnosis       Normal sinus rhythm  When compared with ECG of 25-SEP-2022 15:55,  No significant change was found  Confirmed by Soto Garces ([id number]) on 10/5/2022 3:48:59 PM     CBC WITH AUTOMATED DIFF    Collection Time: 10/05/22 12:59 PM   Result Value Ref Range    WBC 13.5 (H) 4.1 - 11.1 K/uL    RBC 2.10 (L) 4.10 - 5.70 M/uL    HGB 5.1 (LL) 12.1 - 17.0 g/dL    HCT 17.4 (LL) 36.6 - 50.3 %    MCV 82.9 80.0 - 99.0 FL    MCH 24.3 (L) 26.0 - 34.0 PG    MCHC 29.3 (L) 30.0 - 36.5 g/dL    RDW 19.9 (H) 11.5 - 14.5 %    PLATELET 235 (H) 186 - 400 K/uL    MPV 8.8 (L) 8.9 - 12.9 FL    NRBC 0.0 0  WBC    ABSOLUTE NRBC 0.00 0.00 - 0.01 K/uL    NEUTROPHILS 78 (H) 32 - 75 %    LYMPHOCYTES 10 (L) 12 - 49 %    MONOCYTES 9 5 - 13 %    EOSINOPHILS 1 0 - 7 %    BASOPHILS 1 0 - 1 %    IMMATURE GRANULOCYTES 1 (H) 0.0 - 0.5 %    ABS. NEUTROPHILS 10.6 (H) 1.8 - 8.0 K/UL    ABS. LYMPHOCYTES 1.4 0.8 - 3.5 K/UL    ABS. MONOCYTES 1.2 (H) 0.0 - 1.0 K/UL    ABS. EOSINOPHILS 0.1 0.0 - 0.4 K/UL    ABS. BASOPHILS 0.1 0.0 - 0.1 K/UL    ABS. IMM.  GRANS. 0.1 (H) 0.00 - 0.04 K/UL    DF SMEAR SCANNED PLATELET COMMENTS Increased Platelets      RBC COMMENTS ANISOCYTOSIS  1+        RBC COMMENTS HYPOCHROMIA  1+        RBC COMMENTS ROULEAUX  PRESENT        RBC COMMENTS POLYCHROMASIA  PRESENT       METABOLIC PANEL, COMPREHENSIVE    Collection Time: 10/05/22 12:59 PM   Result Value Ref Range    Sodium 136 136 - 145 mmol/L    Potassium 3.9 3.5 - 5.1 mmol/L    Chloride 104 97 - 108 mmol/L    CO2 27 21 - 32 mmol/L    Anion gap 5 5 - 15 mmol/L    Glucose 110 (H) 65 - 100 mg/dL    BUN 21 (H) 6 - 20 MG/DL    Creatinine 0.94 0.70 - 1.30 MG/DL    BUN/Creatinine ratio 22 (H) 12 - 20      eGFR >60 >60 ml/min/1.73m2    Calcium 8.9 8.5 - 10.1 MG/DL    Bilirubin, total 0.4 0.2 - 1.0 MG/DL    ALT (SGPT) 18 12 - 78 U/L    AST (SGOT) 10 (L) 15 - 37 U/L    Alk. phosphatase 57 45 - 117 U/L    Protein, total 6.3 (L) 6.4 - 8.2 g/dL    Albumin 2.8 (L) 3.5 - 5.0 g/dL    Globulin 3.5 2.0 - 4.0 g/dL    A-G Ratio 0.8 (L) 1.1 - 2.2     TYPE & SCREEN    Collection Time: 10/05/22 12:59 PM   Result Value Ref Range    Crossmatch Expiration 10/08/2022,2359     ABO/Rh(D) O POSITIVE     Antibody screen NEG     Unit number [id number]     Blood component type  LR     Unit division 00     Status of unit ALLOCATED     Crossmatch result Compatible     Unit number [id number]     Blood component type  LR,1     Unit division 00     Status of unit ALLOCATED     Crossmatch result Compatible    OCCULT BLOOD, STOOL    Collection Time: 10/05/22  1:43 PM   Result Value Ref Range    Occult blood, stool Positive (A) NEG     RBC, ALLOCATE    Collection Time: 10/05/22  1:45 PM   Result Value Ref Range    HISTORY CHECKED? Historical check performed      Radiologic Studies -   No orders to display     CT Results  (Last 48 hours)      None          CXR Results  (Last 48 hours)      None              Medical Decision Making   I am the first provider for this patient.     I reviewed the vital signs, available nursing notes, past medical history, past surgical history, family history and social history. Vital Signs-Reviewed the patient's vital signs. Patient Vitals for the past 12 hrs:   Temp Pulse Resp BP SpO2   10/05/22 1500 -- 86 13 116/64 99 %   10/05/22 1430 -- 99 13 130/68 98 %   10/05/22 1231 98.1 °F (36.7 °C) 99 18 (!) 102/55 100 %       Records Reviewed: Nursing Notes and Old Medical Records    Provider Notes (Medical Decision Making):   Black tarry stool on rectal exam, recently diagnosed gastric ulcer mild hypertension on arrival we will watch closely. He is pale on exam with pale conjunctive    Abdomen is soft and nontender. Will send Hemoccult, type and screen CBC. ED Course:   Initial assessment performed. The patients presenting problems have been discussed, and they are in agreement with the care plan formulated and outlined with them. I have encouraged them to ask questions as they arise throughout their visit. ED Course as of 10/05/22 1701   Wed Oct 05, 2022   1338 Hemoglobin is 5.1 [WB]   1344 Black stool on rectal exam, 2 units were ordered Protonix is ordered we will plan admission [WB]   1407 Hospitalist paged for admission [WB]   1432 CRITICAL CARE NOTE :    2:32 PM    IMPENDING DETERIORATION -Cardiovascular  ASSOCIATED RISK FACTORS - Bleeding  MANAGEMENT- Bedside Assessment and Supervision of Care  INTERPRETATION -  Blood Pressure  INTERVENTIONS - Transfusion  CASE REVIEW - Hospitalist/Intensivist, Nursing, and Family  TREATMENT RESPONSE -Stable  PERFORMED BY - Self    NOTES   :  I have spent 45 minutes of critical care time involved in lab review, consultations with specialist, family decision- making, bedside attention and documentation. This time excludes time spent in any separate billed procedures. During this entire length of time I was immediately available to the patient .     Jorgito Hunt MD [WB]      ED Course User Index  [WB] Yahir Guzman MD     Disposition:    Admission Note:  Patient is being admitted to the hospital by Dr. Pam Quintana, Service: Hospitalist.  The results of their tests and reasons for their admission have been discussed with them and available family. They convey agreement and understanding for the need to be admitted and for their admission diagnosis. Diagnosis     Clinical Impression:   1. Upper GI bleed        Attestations:    Mandy Kwong M.D. Please note that this dictation was completed with uBeam, the computer voice recognition software. Quite often unanticipated grammatical, syntax, homophones, and other interpretive errors are inadvertently transcribed by the computer software. Please disregard these errors. Please excuse any errors that have escaped final proofreading. Thank you.

## 2022-10-05 NOTE — CONSULTS
GI CONSULTATION NOTE  Mine Saunders, NP  [phone] NP in-hospital cell phone M-F until 4:30  After 5pm or on weekends, please call  for physician on call    NAME: Reina Mann   :  1950   MRN:  [id number]   Attending:  Dr. Noemi Mock  Primary GI:  Dr. Lito Pang  Date/Time:  10/5/2022 3:05 PM  Assessment:   Acute on chronic anemia   Gastric antral ulcer on EGD 2022  Hgb 5.1; was 7.6 on 2022  FOBT +  EGD on 2022 showed acute gastric antral ulcer without bleeding     GI History:  EGD 2022 : -- moderate focal gastritis, possibly a healing ulcer, without active bleeding present today, biopsied -- proximal esophageal web, widely patent -- no fareed bleeding nor additional source of anemia/melena seen. Bx : reactive gastropathy. CLN - [facility] many years ago, hx of colostomy but had reversal 15 years ago. States he got a bag for twisted colon at the same time his gallbladder was removed. Hx of CVA  Plavix and ASA have been held since last admission   Plan:   No direct plans for repeat EGD at this time. However, recommend colonoscopy to assess for other possible sources of bleeding. Patient refusing at this time. Will continue to discuss/recommend colonoscopy with patient   Continue PPI BID   Continue Carafate 1 gm QID  Hold Plavix and ASA  Iron profile ordered   Clear liquid diet   Symptomatic care per primary team   Plan discussed with Dr. Lito Pang   Subjective:     HISTORY OF PRESENT ILLNESS:     Reina Mann is an 67 y.o.  male who we are asked to see for complaint of anemia. Patient states that he was seen at PCP office for follow-up yesterday and was found to be anemic. PCP recommended patient go to ED. Patient denies any GI complaints. He states that he had one black stool since discharge. Denies any nausea or vomiting. Patient has not restarted Plavix or ASA since previous admission.     Patient is well overdue for colonoscopy but he is refusing at this time. He also refused during last admission. Past Medical History:   Diagnosis Date    Essential hypertension     Hyperlipidemia     Neurological disorder     Stroke [facility])       Past Surgical History:   Procedure Laterality Date    HX HERNIA REPAIR  2009    HX HERNIA REPAIR  2007    [place] ABDOMEN SURGERY PROC UNLISTED  3022,2946    colostomy, reversal     Social History     Tobacco Use    Smoking status: Former    Smokeless tobacco: Never   Substance Use Topics    Alcohol use: Yes     Comment: rarely      Family History   Problem Relation Age of Onset    Cancer Mother     Heart Attack Father       Allergies   Allergen Reactions    Pcn [Penicillins] Unknown (comments)    Sulfa (Sulfonamide Antibiotics) Other (comments)      Prior to Admission medications    Medication Sig Start Date End Date Taking? Authorizing Provider   sucralfate (CARAFATE) 1 gram tablet Take 1 Tablet by mouth Before breakfast, lunch, dinner and at bedtime for 14 days. 9/27/22 10/11/22  Rola Braga MD   pantoprazole (PROTONIX) 40 mg granules for oral suspension Take 40 mg by mouth Before breakfast and dinner for 30 days. 9/27/22 10/27/22  Rola Braga MD   b complex vitamins tablet Take 1 Tablet by mouth daily as needed. Provider, Historical   coenzyme q10 10 mg cap Take  by mouth. Provider, Historical   atorvastatin (LIPITOR) 40 mg tablet TAKE 1 TABLET BY MOUTH DAILY. REPEAT FASTING LABS 3-2-2022 BEFORE FURTHER REFILLS  Patient taking differently: Take 40 mg by mouth nightly. 2/15/22   Thi Lopez MD   losartan (COZAAR) 25 mg tablet Take 1 Tablet by mouth daily. 2/14/22   Thi Lopez MD   cyanocobalamin 1,000 mcg tablet Take 1,000 mcg by mouth.     Provider, Historical       Patient Active Problem List   Diagnosis Code    Acute blood loss anemia D62    Upper GI bleed K92.2    Sepsis ([address] A41.9    Hypotension I95.9    GI bleed K92.2       REVIEW OF SYSTEMS:    Constitutional: negative fever, negative chills, negative weight loss  Eyes:   negative visual changes  ENT:   negative sore throat, tongue or lip swelling   Respiratory:  negative cough, negative dyspnea  Cards:  negative for chest pain, palpitations, lower extremity edema  GI:   See HPI  :  negative for frequency, dysuria  Integument:  negative for rash and pruritus  Heme:  negative for easy bruising and gum/nose bleeding  Musculoskel: negative for myalgias,  back pain and muscle weakness  Neuro: negative for headaches, dizziness, vertigo  Psych: negative for feelings of anxiety, depression     Objective:   VITALS:    Visit Vitals  BP (!) 102/55 (BP 1 Location: Left upper arm, BP Patient Position: At rest)   Pulse 99   Temp 98.1 °F (36.7 °C)   Resp 18   Ht 6' 2\" (1.88 m)   Wt 63.5 kg (140 lb)   SpO2 100%   BMI 17.97 kg/m²       PHYSICAL EXAM:   General:          Pleasant  male. NAD   Head:               Normocephalic, without obvious abnormality, atraumatic. Eyes:               Conjunctivae clear and pale, anicteric sclerae. Pupils are equal  Nose:               Nares normal. No drainage or sinus tenderness. Throat:             Lips, mucosa, and tongue normal.  No Thrush  Neck:               Supple, symmetrical,  no adenopathy, thyroid: non tender  Back:               Symmetric,  No CVA tenderness. Lungs:             CTA bilaterally. No wheezing/rhonchi/rales. Chest wall:      No tenderness or deformity. No Accessory muscle use. Heart:              Regular rate and rhythm,  no murmur, rub or gallop. Abdomen:        Soft, non-tender. Not distended. Bowel sounds normal. No masses  Extremities:     Atraumatic, No cyanosis. No edema. No clubbing  Skin:                Texture, turgor normal. No rashes/lesions/jaundice  Psych:             Good insight. Not depressed. Not anxious or agitated. Neurologic:      EOMs intact. No facial asymmetry. No aphasia or slurred speech. A/O X 3.      LAB DATA REVIEWED:    Recent Results (from the past 24 hour(s))   EKG, 12 LEAD, INITIAL    Collection Time: 10/05/22 12:41 PM   Result Value Ref Range    Ventricular Rate 90 BPM    Atrial Rate 90 BPM    P-R Interval 142 ms    QRS Duration 88 ms    Q-T Interval 370 ms    QTC Calculation (Bezet) 452 ms    Calculated P Axis 88 degrees    Calculated R Axis 55 degrees    Calculated T Axis 67 degrees    Diagnosis       Normal sinus rhythm  Normal ECG  When compared with ECG of 25-SEP-2022 15:55,  No significant change was found     CBC WITH AUTOMATED DIFF    Collection Time: 10/05/22 12:59 PM   Result Value Ref Range    WBC 13.5 (H) 4.1 - 11.1 K/uL    RBC 2.10 (L) 4.10 - 5.70 M/uL    HGB 5.1 (LL) 12.1 - 17.0 g/dL    HCT 17.4 (LL) 36.6 - 50.3 %    MCV 82.9 80.0 - 99.0 FL    MCH 24.3 (L) 26.0 - 34.0 PG    MCHC 29.3 (L) 30.0 - 36.5 g/dL    RDW 19.9 (H) 11.5 - 14.5 %    PLATELET 765 (H) 808 - 400 K/uL    MPV 8.8 (L) 8.9 - 12.9 FL    NRBC 0.0 0  WBC    ABSOLUTE NRBC 0.00 0.00 - 0.01 K/uL    NEUTROPHILS 78 (H) 32 - 75 %    LYMPHOCYTES 10 (L) 12 - 49 %    MONOCYTES 9 5 - 13 %    EOSINOPHILS 1 0 - 7 %    BASOPHILS 1 0 - 1 %    IMMATURE GRANULOCYTES 1 (H) 0.0 - 0.5 %    ABS. NEUTROPHILS 10.6 (H) 1.8 - 8.0 K/UL    ABS. LYMPHOCYTES 1.4 0.8 - 3.5 K/UL    ABS. MONOCYTES 1.2 (H) 0.0 - 1.0 K/UL    ABS. EOSINOPHILS 0.1 0.0 - 0.4 K/UL    ABS. BASOPHILS 0.1 0.0 - 0.1 K/UL    ABS. IMM.  GRANS. 0.1 (H) 0.00 - 0.04 K/UL    DF SMEAR SCANNED      PLATELET COMMENTS Increased Platelets      RBC COMMENTS ANISOCYTOSIS  1+        RBC COMMENTS HYPOCHROMIA  1+        RBC COMMENTS ROULEAUX  PRESENT        RBC COMMENTS POLYCHROMASIA  PRESENT       METABOLIC PANEL, COMPREHENSIVE    Collection Time: 10/05/22 12:59 PM   Result Value Ref Range    Sodium 136 136 - 145 mmol/L    Potassium 3.9 3.5 - 5.1 mmol/L    Chloride 104 97 - 108 mmol/L    CO2 27 21 - 32 mmol/L    Anion gap 5 5 - 15 mmol/L    Glucose 110 (H) 65 - 100 mg/dL    BUN 21 (H) 6 - 20 MG/DL    Creatinine 0.94 0.70 - 1.30 MG/DL    BUN/Creatinine ratio 22 (H) 12 - 20      eGFR >60 >60 ml/min/1.73m2    Calcium 8.9 8.5 - 10.1 MG/DL    Bilirubin, total 0.4 0.2 - 1.0 MG/DL    ALT (SGPT) 18 12 - 78 U/L    AST (SGOT) 10 (L) 15 - 37 U/L    Alk. phosphatase 57 45 - 117 U/L    Protein, total 6.3 (L) 6.4 - 8.2 g/dL    Albumin 2.8 (L) 3.5 - 5.0 g/dL    Globulin 3.5 2.0 - 4.0 g/dL    A-G Ratio 0.8 (L) 1.1 - 2.2     TYPE & SCREEN    Collection Time: 10/05/22 12:59 PM   Result Value Ref Range    Crossmatch Expiration 10/08/2022,2359     ABO/Rh(D) O POSITIVE     Antibody screen NEG     Unit number [id number]     Blood component type  LR     Unit division 00     Status of unit ALLOCATED     Crossmatch result Compatible     Unit number [id number]     Blood component type  LR,1     Unit division 00     Status of unit ALLOCATED     Crossmatch result Compatible    OCCULT BLOOD, STOOL    Collection Time: 10/05/22  1:43 PM   Result Value Ref Range    Occult blood, stool Positive (A) NEG     RBC, ALLOCATE    Collection Time: 10/05/22  1:45 PM   Result Value Ref Range    HISTORY CHECKED?  Historical check performed        IMAGING RESULTS:  I have personally reviewed the imaging reports      Total time spent with patient: 25 minutes ________________________________________________________________________  Care Plan discussed with:  Patient x   Family  x   RN               Consultant:       CT  10/5/2022:  ________________________________________________________________________    ___________________________________________________  Consulting Provider: Thomas Mcadams NP      10/5/2022  3:06 PM

## 2022-10-05 NOTE — H&P
Hospitalist Admission Note    NAME: Praveen Olivas   :  1950   MRN:  [id number]     Date/Time:  10/5/2022 5:01 PM    Patient PCP: Katheryn Rangel MD  ______________________________________________________________________  Given the patient's current clinical presentation, I have a high level of concern for decompensation if discharged from the emergency department. Complex decision making was performed, which includes reviewing the patient's available past medical records, laboratory results, and x-ray films. My assessment of this patient's clinical condition and my plan of care is as follows. Assessment / Plan:  Acute blood loss anemia  Melena  -Hemoglobin is 5.12 units of PRBCs were ordered. Check serial hemoglobin. GI consulted and offered colonoscopy but patient refused  -Iron profile ordered  -Keep n.p.o. from midnight. Start clear liquid diet.  -Is currently hemodynamically stable  -He has been holding his home aspirin and Plavix since last admission  -Per GI no plans for repeat endoscopy at this time    Hypertension  Dyslipidemia  -Hold home losartan due to severe anemia and melena    Dyslipidemia  -Continue Lipitor    History of CVA  -Holding home aspirin and Plavix due to recent gastric ulcer and also blood loss anemia    Leukocytosis likely reactive  -Check CBC in a.m. tomorrow. If still high, consider further work-up. Check procalcitonin      Code Status: Full code  Surrogate Decision Maker:    DVT Prophylaxis: SCDs  GI Prophylaxis: not indicated    Baseline: From home, independent of ADLs      Subjective:   CHIEF COMPLAINT: Dark stool    HISTORY OF PRESENT ILLNESS:     Kristofer Arzate is a 67 y.o.  male who presents with past medical's of hypertension, CVA is coming the hospital chief complaints of low hemoglobin and also dark stool.   Patient was recently admitted to hospital and underwent an endoscopy and was noted to have an ulcer and was prescribed Protonix and recommended colonoscopy but patient refused. Patient went to see his primary care physician and had lab work which revealed a hemoglobin was advised to go to the emergency department for further evaluation. He reports 1 episode of black stools since discharge from the hospital.    On arrival to ED, he was noted to have hemoglobin of 5.1 for which PRBCs were ordered and gastroenterology was consulted. We were asked to admit for work up and evaluation of the above problems. Past Medical History:   Diagnosis Date    Essential hypertension     Hyperlipidemia     Neurological disorder     Stroke [facility])         Past Surgical History:   Procedure Laterality Date    HX HERNIA REPAIR  2009    HX HERNIA REPAIR  2007    [place] ABDOMEN SURGERY PROC UNLISTED  8323,6710    colostomy, reversal       Social History     Tobacco Use    Smoking status: Former    Smokeless tobacco: Never   Substance Use Topics    Alcohol use: Yes     Comment: rarely        Family History   Problem Relation Age of Onset    Cancer Mother     Heart Attack Father      Allergies   Allergen Reactions    Pcn [Penicillins] Unknown (comments)    Sulfa (Sulfonamide Antibiotics) Other (comments)        Prior to Admission medications    Medication Sig Start Date End Date Taking? Authorizing Provider   sucralfate (CARAFATE) 1 gram tablet Take 1 Tablet by mouth Before breakfast, lunch, dinner and at bedtime for 14 days. 9/27/22 10/11/22  Goldie Obrien MD   pantoprazole (PROTONIX) 40 mg granules for oral suspension Take 40 mg by mouth Before breakfast and dinner for 30 days. 9/27/22 10/27/22  Goldie Obrien MD   b complex vitamins tablet Take 1 Tablet by mouth daily as needed. Provider, Historical   coenzyme q10 10 mg cap Take  by mouth. Provider, Historical   atorvastatin (LIPITOR) 40 mg tablet TAKE 1 TABLET BY MOUTH DAILY. REPEAT FASTING LABS 3-2-2022 BEFORE FURTHER REFILLS  Patient taking differently: Take 40 mg by mouth nightly.  2/15/22   Lawerence Harada, Cami Blancas MD   losartan (COZAAR) 25 mg tablet Take 1 Tablet by mouth daily. 2/14/22   Ann Martins MD   cyanocobalamin 1,000 mcg tablet Take 1,000 mcg by mouth. Provider, Historical       REVIEW OF SYSTEMS:     I am not able to complete the review of systems because: The patient is intubated and sedated    The patient has altered mental status due to his acute medical problems    The patient has baseline aphasia from prior stroke(s)    The patient has baseline dementia and is not reliable historian    The patient is in acute medical distress and unable to provide information           Total of 12 systems reviewed as follows:       POSITIVE= underlined text  Negative = text not underlined  General:  fever, chills, sweats, generalized weakness, weight loss/gain,      loss of appetite   Eyes:    blurred vision, eye pain, loss of vision, double vision  ENT:    rhinorrhea, pharyngitis   Respiratory:   cough, sputum production, SOB, URBAN, wheezing, pleuritic pain   Cardiology:   chest pain, palpitations, orthopnea, PND, edema, syncope   Gastrointestinal:  abdominal pain , N/V, diarrhea, dysphagia, constipation, bleeding   Genitourinary:  frequency, urgency, dysuria, hematuria, incontinence   Muskuloskeletal :  arthralgia, myalgia, back pain  Hematology:  easy bruising, nose or gum bleeding, lymphadenopathy   Dermatological: rash, ulceration, pruritis, color change / jaundice  Endocrine:   hot flashes or polydipsia   Neurological:  headache, dizziness, confusion, focal weakness, paresthesia,     Speech difficulties, memory loss, gait difficulty  Psychological: Feelings of anxiety, depression, agitation    Objective:   VITALS:    Visit Vitals  /64   Pulse 86   Temp 98.1 °F (36.7 °C)   Resp 13   Ht 6' 2\" (1.88 m)   Wt 63.5 kg (140 lb)   SpO2 99%   BMI 17.97 kg/m²       PHYSICAL EXAM:    General:    Alert, cooperative, no distress, appears stated age.      HEENT: Atraumatic, anicteric sclerae, pink conjunctivae     No oral ulcers, mucosa moist, throat clear, dentition fair  Neck:  Supple, symmetrical,  thyroid: non tender  Lungs:   Clear to auscultation bilaterally. No Wheezing or Rhonchi. No rales. Chest wall:  No tenderness  No Accessory muscle use. Heart:   Regular  rhythm,  No  murmur   No edema  Abdomen:   Soft, non-tender. Not distended. Bowel sounds normal  Extremities: No cyanosis. No clubbing,      Skin turgor normal, Capillary refill normal, Radial dial pulse 2+  Skin:     Not pale. Not Jaundiced  No rashes   Psych:  Good insight. Not depressed. Not anxious or agitated. Neurologic: EOMs intact. No facial asymmetry. No aphasia or slurred speech. Symmetrical strength, Sensation grossly intact. Alert and oriented X 4.     _______________________________________________________________________  Care Plan discussed with:    Comments   Patient y    Family      RN y    Care Manager                    Consultant:      _______________________________________________________________________  Expected  Disposition:   Home with Family y   HH/PT/OT/RN    SNF/LTC    JORGE    ________________________________________________________________________  TOTAL TIME:  61 Minutes    Critical Care Provided     Minutes non procedure based      Comments    y Reviewed previous records   >50% of visit spent in counseling and coordination of care y Discussion with patient and/or family and questions answered       ________________________________________________________________________  Signed: Gwendolynn Dakins, MD    Procedures: see electronic medical records for all procedures/Xrays and details which were not copied into this note but were reviewed prior to creation of Plan.     LAB DATA REVIEWED:    Recent Results (from the past 24 hour(s))   EKG, 12 LEAD, INITIAL    Collection Time: 10/05/22 12:41 PM   Result Value Ref Range    Ventricular Rate 90 BPM    Atrial Rate 90 BPM    P-R Interval 142 ms    QRS Duration 88 ms Q-T Interval 370 ms    QTC Calculation (Bezet) 452 ms    Calculated P Axis 88 degrees    Calculated R Axis 55 degrees    Calculated T Axis 67 degrees    Diagnosis       Normal sinus rhythm  When compared with ECG of 25-SEP-2022 15:55,  No significant change was found  Confirmed by Colleen Bashir ([id number]) on 10/5/2022 3:48:59 PM     CBC WITH AUTOMATED DIFF    Collection Time: 10/05/22 12:59 PM   Result Value Ref Range    WBC 13.5 (H) 4.1 - 11.1 K/uL    RBC 2.10 (L) 4.10 - 5.70 M/uL    HGB 5.1 (LL) 12.1 - 17.0 g/dL    HCT 17.4 (LL) 36.6 - 50.3 %    MCV 82.9 80.0 - 99.0 FL    MCH 24.3 (L) 26.0 - 34.0 PG    MCHC 29.3 (L) 30.0 - 36.5 g/dL    RDW 19.9 (H) 11.5 - 14.5 %    PLATELET 253 (H) 763 - 400 K/uL    MPV 8.8 (L) 8.9 - 12.9 FL    NRBC 0.0 0  WBC    ABSOLUTE NRBC 0.00 0.00 - 0.01 K/uL    NEUTROPHILS 78 (H) 32 - 75 %    LYMPHOCYTES 10 (L) 12 - 49 %    MONOCYTES 9 5 - 13 %    EOSINOPHILS 1 0 - 7 %    BASOPHILS 1 0 - 1 %    IMMATURE GRANULOCYTES 1 (H) 0.0 - 0.5 %    ABS. NEUTROPHILS 10.6 (H) 1.8 - 8.0 K/UL    ABS. LYMPHOCYTES 1.4 0.8 - 3.5 K/UL    ABS. MONOCYTES 1.2 (H) 0.0 - 1.0 K/UL    ABS. EOSINOPHILS 0.1 0.0 - 0.4 K/UL    ABS. BASOPHILS 0.1 0.0 - 0.1 K/UL    ABS. IMM.  GRANS. 0.1 (H) 0.00 - 0.04 K/UL    DF SMEAR SCANNED      PLATELET COMMENTS Increased Platelets      RBC COMMENTS ANISOCYTOSIS  1+        RBC COMMENTS HYPOCHROMIA  1+        RBC COMMENTS ROULEAUX  PRESENT        RBC COMMENTS POLYCHROMASIA  PRESENT       METABOLIC PANEL, COMPREHENSIVE    Collection Time: 10/05/22 12:59 PM   Result Value Ref Range    Sodium 136 136 - 145 mmol/L    Potassium 3.9 3.5 - 5.1 mmol/L    Chloride 104 97 - 108 mmol/L    CO2 27 21 - 32 mmol/L    Anion gap 5 5 - 15 mmol/L    Glucose 110 (H) 65 - 100 mg/dL    BUN 21 (H) 6 - 20 MG/DL    Creatinine 0.94 0.70 - 1.30 MG/DL    BUN/Creatinine ratio 22 (H) 12 - 20      eGFR >60 >60 ml/min/1.73m2    Calcium 8.9 8.5 - 10.1 MG/DL    Bilirubin, total 0.4 0.2 - 1.0 MG/DL    ALT (SGPT) 18 12 - 78 U/L    AST (SGOT) 10 (L) 15 - 37 U/L    Alk. phosphatase 57 45 - 117 U/L    Protein, total 6.3 (L) 6.4 - 8.2 g/dL    Albumin 2.8 (L) 3.5 - 5.0 g/dL    Globulin 3.5 2.0 - 4.0 g/dL    A-G Ratio 0.8 (L) 1.1 - 2.2     TYPE & SCREEN    Collection Time: 10/05/22 12:59 PM   Result Value Ref Range    Crossmatch Expiration 10/08/2022,2359     ABO/Rh(D) O POSITIVE     Antibody screen NEG     Unit number [id number]     Blood component type  LR     Unit division 00     Status of unit ALLOCATED     Crossmatch result Compatible     Unit number [id number]     Blood component type RC LR,1     Unit division 00     Status of unit ALLOCATED     Crossmatch result Compatible    OCCULT BLOOD, STOOL    Collection Time: 10/05/22  1:43 PM   Result Value Ref Range    Occult blood, stool Positive (A) NEG     RBC, ALLOCATE    Collection Time: 10/05/22  1:45 PM   Result Value Ref Range    HISTORY CHECKED?  Historical check performed

## 2022-10-06 LAB
ANION GAP SERPL CALC-SCNC: 7 MMOL/L (ref 5–15)
BUN SERPL-MCNC: 24 MG/DL (ref 6–20)
BUN/CREAT SERPL: 29 (ref 12–20)
CALCIUM SERPL-MCNC: 7.9 MG/DL (ref 8.5–10.1)
CHLORIDE SERPL-SCNC: 112 MMOL/L (ref 97–108)
CO2 SERPL-SCNC: 25 MMOL/L (ref 21–32)
COMMENT, HOLDF: NORMAL
CREAT SERPL-MCNC: 0.82 MG/DL (ref 0.7–1.3)
ERYTHROCYTE [DISTWIDTH] IN BLOOD BY AUTOMATED COUNT: 17.5 % (ref 11.5–14.5)
GLUCOSE SERPL-MCNC: 99 MG/DL (ref 65–100)
HCT VFR BLD AUTO: 18.9 % (ref 36.6–50.3)
HCT VFR BLD AUTO: 26.1 % (ref 36.6–50.3)
HGB BLD-MCNC: 5.7 G/DL (ref 12.1–17)
HGB BLD-MCNC: 7.8 G/DL (ref 12.1–17)
HISTORY CHECKED?,CKHIST: NORMAL
IRON SATN MFR SERPL: 5 % (ref 20–50)
IRON SERPL-MCNC: 19 UG/DL (ref 35–150)
MCH RBC QN AUTO: 24.9 PG (ref 26–34)
MCHC RBC AUTO-ENTMCNC: 29.9 G/DL (ref 30–36.5)
MCV RBC AUTO: 83.4 FL (ref 80–99)
NRBC # BLD: 0 K/UL (ref 0–0.01)
NRBC BLD-RTO: 0 PER 100 WBC
PLATELET # BLD AUTO: 223 K/UL (ref 150–400)
PMV BLD AUTO: 10.7 FL (ref 8.9–12.9)
POTASSIUM SERPL-SCNC: 3.2 MMOL/L (ref 3.5–5.1)
RBC # BLD AUTO: 3.13 M/UL (ref 4.1–5.7)
SAMPLES BEING HELD,HOLD: NORMAL
SODIUM SERPL-SCNC: 144 MMOL/L (ref 136–145)
TIBC SERPL-MCNC: 359 UG/DL (ref 250–450)
WBC # BLD AUTO: 3.2 K/UL (ref 4.1–11.1)

## 2022-10-06 PROCEDURE — 74011250636 HC RX REV CODE- 250/636: Performed by: NURSE PRACTITIONER

## 2022-10-06 PROCEDURE — 85027 COMPLETE CBC AUTOMATED: CPT

## 2022-10-06 PROCEDURE — 85018 HEMOGLOBIN: CPT

## 2022-10-06 PROCEDURE — 74011000250 HC RX REV CODE- 250: Performed by: INTERNAL MEDICINE

## 2022-10-06 PROCEDURE — 74011250637 HC RX REV CODE- 250/637: Performed by: INTERNAL MEDICINE

## 2022-10-06 PROCEDURE — 80048 BASIC METABOLIC PNL TOTAL CA: CPT

## 2022-10-06 PROCEDURE — 36415 COLL VENOUS BLD VENIPUNCTURE: CPT

## 2022-10-06 PROCEDURE — 65270000029 HC RM PRIVATE

## 2022-10-06 PROCEDURE — 74011250636 HC RX REV CODE- 250/636: Performed by: INTERNAL MEDICINE

## 2022-10-06 PROCEDURE — 83540 ASSAY OF IRON: CPT

## 2022-10-06 PROCEDURE — C9113 INJ PANTOPRAZOLE SODIUM, VIA: HCPCS | Performed by: INTERNAL MEDICINE

## 2022-10-06 PROCEDURE — 36430 TRANSFUSION BLD/BLD COMPNT: CPT

## 2022-10-06 PROCEDURE — P9016 RBC LEUKOCYTES REDUCED: HCPCS

## 2022-10-06 RX ORDER — POTASSIUM CHLORIDE 7.45 MG/ML
10 INJECTION INTRAVENOUS
Status: DISPENSED | OUTPATIENT
Start: 2022-10-06 | End: 2022-10-06

## 2022-10-06 RX ORDER — PANTOPRAZOLE SODIUM 40 MG/1
40 TABLET, DELAYED RELEASE ORAL
Status: DISCONTINUED | OUTPATIENT
Start: 2022-10-06 | End: 2022-10-08 | Stop reason: HOSPADM

## 2022-10-06 RX ORDER — POTASSIUM CHLORIDE 7.45 MG/ML
10 INJECTION INTRAVENOUS ONCE
Status: COMPLETED | OUTPATIENT
Start: 2022-10-06 | End: 2022-10-06

## 2022-10-06 RX ADMIN — SUCRALFATE 1 G: 1 TABLET ORAL at 12:21

## 2022-10-06 RX ADMIN — PANTOPRAZOLE SODIUM 40 MG: 40 TABLET, DELAYED RELEASE ORAL at 18:34

## 2022-10-06 RX ADMIN — SODIUM CHLORIDE 100 ML/HR: 9 INJECTION, SOLUTION INTRAMUSCULAR; INTRAVENOUS; SUBCUTANEOUS at 05:17

## 2022-10-06 RX ADMIN — SODIUM CHLORIDE 100 ML/HR: 9 INJECTION, SOLUTION INTRAMUSCULAR; INTRAVENOUS; SUBCUTANEOUS at 08:45

## 2022-10-06 RX ADMIN — SUCRALFATE 1 G: 1 TABLET ORAL at 08:45

## 2022-10-06 RX ADMIN — SODIUM CHLORIDE 40 MG: 9 INJECTION, SOLUTION INTRAMUSCULAR; INTRAVENOUS; SUBCUTANEOUS at 08:46

## 2022-10-06 RX ADMIN — POTASSIUM CHLORIDE 10 MEQ: 7.46 INJECTION, SOLUTION INTRAVENOUS at 10:02

## 2022-10-06 RX ADMIN — SODIUM CHLORIDE, PRESERVATIVE FREE 10 ML: 5 INJECTION INTRAVENOUS at 21:50

## 2022-10-06 RX ADMIN — IRON SUCROSE 100 MG: 20 INJECTION, SOLUTION INTRAVENOUS at 12:21

## 2022-10-06 RX ADMIN — ATORVASTATIN CALCIUM 40 MG: 40 TABLET, FILM COATED ORAL at 21:50

## 2022-10-06 RX ADMIN — SODIUM CHLORIDE, PRESERVATIVE FREE 10 ML: 5 INJECTION INTRAVENOUS at 16:02

## 2022-10-06 RX ADMIN — SODIUM CHLORIDE, PRESERVATIVE FREE 10 ML: 5 INJECTION INTRAVENOUS at 05:10

## 2022-10-06 RX ADMIN — POTASSIUM CHLORIDE 10 MEQ: 7.46 INJECTION, SOLUTION INTRAVENOUS at 11:24

## 2022-10-06 RX ADMIN — SUCRALFATE 1 G: 1 TABLET ORAL at 18:34

## 2022-10-06 RX ADMIN — POTASSIUM CHLORIDE 10 MEQ: 7.46 INJECTION, SOLUTION INTRAVENOUS at 08:46

## 2022-10-06 RX ADMIN — SUCRALFATE 1 G: 1 TABLET ORAL at 21:50

## 2022-10-06 NOTE — CONSULTS
Consult    Patient: Ivan Dawn MRN: [id number]  SSN: [id number]    YOB: 1950  Age: 67 y.o. Sex: male      Subjective:      Ivan Dawn is a 67 y.o. male who is being seen for recurrent painless rectal bleeding. He's overdue for a colonoscopy and would like me to do it since I took care of his wife. He was driving from [place] when he first noticed it. He was previously on Plavix but it was not restarted after his last admission for LGIB.         Past Medical History:   Diagnosis Date    Essential hypertension     Hyperlipidemia     Neurological disorder     Stroke [facility])      Past Surgical History:   Procedure Laterality Date    HX HERNIA REPAIR  2009    HX HERNIA REPAIR  2007    [place] ABDOMEN SURGERY PROC UNLISTED  0412,9950    colostomy, reversal      Family History   Problem Relation Age of Onset    Cancer Mother     Heart Attack Father      Social History     Tobacco Use    Smoking status: Former    Smokeless tobacco: Never   Substance Use Topics    Alcohol use: Yes     Comment: rarely      Current Facility-Administered Medications   Medication Dose Route Frequency Provider Last Rate Last Admin    pantoprazole (PROTONIX) tablet 40 mg  40 mg Oral ACB&D Ortiz CHAN MD        iron sucrose (VENOFER) injection 100 mg  100 mg IntraVENous DAILY Nayely Benz, NP   100 mg at 10/06/22 1221    peg 3350-electrolytes (COLYTE) 4000 mL  4,000 mL Oral ONCE Jason Gordon MD        0.9% sodium chloride infusion 250 mL  250 mL IntraVENous PRN Everton Rice MD        sucralfate (CARAFATE) tablet 1 g  1 g Oral AC&HS Everton Rice MD   1 g at 10/06/22 1221    atorvastatin (LIPITOR) tablet 40 mg  40 mg Oral QHS Everton Rice MD   40 mg at 10/05/22 2337    sodium chloride (NS) flush 5-40 mL  5-40 mL IntraVENous Q8H Shelby CHAU MD   10 mL at 10/06/22 0510    sodium chloride (NS) flush 5-40 mL  5-40 mL IntraVENous PRN Everton Rice MD        acetaminophen (TYLENOL) tablet 650 mg  650 mg Oral Q6H PRN Madeline Montenegro MD        Or    acetaminophen (TYLENOL) suppository 650 mg  650 mg Rectal Q6H PRN Von King MD        polyethylene glycol (MIRALAX) packet 17 g  17 g Oral DAILY PRN Madeline Montenegro MD        ondansetron (ZOFRAN ODT) tablet 4 mg  4 mg Oral Q8H PRN Madeline Montenegro MD        Or    ondansetron (ZOFRAN) injection 4 mg  4 mg IntraVENous Q6H PRN Madeline Montenegro MD            Allergies   Allergen Reactions    Pcn [Penicillins] Unknown (comments)    Sulfa (Sulfonamide Antibiotics) Other (comments)       Review of Systems:  A comprehensive review of systems was negative except for that written in the History of Present Illness. Objective:     Vitals:    10/06/22 0145 10/06/22 0307 10/06/22 0757 10/06/22 1206   BP: 132/78 (!) 130/56 138/68 137/65   Pulse: 79 73 85 78   Resp: 16 13 16 16   Temp: 98.6 °F (37 °C) 98.1 °F (36.7 °C) 98.2 °F (36.8 °C) 98.2 °F (36.8 °C)   SpO2: 99% 96%  97%   Weight:       Height:            Physical Exam:  General:  Alert, cooperative, no distress, appears stated age. Eyes:  Conjunctivae/corneas clear. PERRL, EOMs intact. Fundi benign   Ears:  Normal TMs and external ear canals both ears. Nose: Nares normal. Septum midline. Mucosa normal. No drainage or sinus tenderness. Mouth/Throat: Lips, mucosa, and tongue normal. Teeth and gums normal.   Neck: Supple, symmetrical, trachea midline, no adenopathy, thyroid: no enlargment/tenderness/nodules, no carotid bruit and no JVD. Back:   Symmetric, no curvature. ROM normal. No CVA tenderness. Lungs:   Clear to auscultation bilaterally. Heart:  Regular rate and rhythm, S1, S2 normal, no murmur, click, rub or gallop. Abdomen:   Soft, non-tender. Bowel sounds normal. No masses,  No organomegaly. Extremities: Extremities normal, atraumatic, no cyanosis or edema. Pulses: 2+ and symmetric all extremities.    Skin: Skin color, texture, turgor normal. No rashes or lesions   Lymph nodes: Cervical, supraclavicular, and axillary nodes normal.   Neurologic: CNII-XII intact. Normal strength, sensation and reflexes throughout. Assessment:     Hospital Problems  Date Reviewed: 7/25/2022            Codes Class Noted POA    GI bleed ICD-10-CM: K92.2  ICD-9-CM: 578.9  10/5/2022 Unknown        Acute blood loss anemia ICD-10-CM: D62  ICD-9-CM: 285.1  7/23/2022 Unknown           Plan:      Bowel prep today  Colonoscopy tomorrow at 1pm  Clear liquid today and NPO tomorrow morning at 5am    Signed By: Ana Pizarro MD     October 6, 2022

## 2022-10-06 NOTE — PROGRESS NOTES
Received notification from bedside RN about patient with regards to: H/H 7.8/26.1 s/p 1 unit PRBC transfusion. Has a 2nd unit of PRBC ordered.  Noted AM K+ of 3.3, NPO  VS: /56, HR 73, RR 13, O2 sat 96% on RA    Intervention given:   - Do not give 2nd unit of PRBC, patient has orders for hemoglobin q 8 hours  - KCL 10 meq IV x 3 doses, BMP for tomorrow AM

## 2022-10-06 NOTE — PROGRESS NOTES
Patient Hgb was originally 5.1 upon admission. Patient Hgb increased to 7.8 after the first unit of blood was given, MD was notified and MD stated to hold the second unit of blood.

## 2022-10-06 NOTE — PROGRESS NOTES
GI PROGRESS NOTE  Ginger Felty, NP  [phone] NP in-hospital cell phone M-F until 4:30  After 5pm or on weekends, please call  for physician on call    NAME:Mick Weathers WKR:[id number]   ATTG: Dr. Glendy Hall   PCP: Edward Amaro MD  Date/Time:  10/6/2022 10:12 AM   Primary GI: Dr. Ger Saunders   Reason for following: Anemia     Assessment:     Acute on chronic anemia   Gastric antral ulcer on EGD 09/26/2022  Hgb 7.8  FOBT +  EGD on 09/26/2022 showed acute gastric antral ulcer without bleeding   Iron 19, TIBC 359, % saturation 5     GI History:  EGD 7/2022 : -- moderate focal gastritis, possibly a healing ulcer, without active bleeding present today, biopsied -- proximal esophageal web, widely patent -- no fareed bleeding nor additional source of anemia/melena seen. Bx : reactive gastropathy. CLN - Mick many years ago, hx of colostomy but had reversal 15 years ago. States he got a bag for twisted colon at the same time his gallbladder was removed. Hx of CVA  Plavix and ASA have been held since last admission      Plan:     No direct plans for repeat EGD at this time. However, recommend colonoscopy to assess for other possible sources of bleeding. Patient refusing at this time. Will continue to discuss/recommend colonoscopy with patient. Patient requesting to speak with Dr. Evy Eaton, [facility]. regarding colonoscopy   Continue PPI BID   Continue Carafate 1 gm QID  Recommend IV iron   Hold Plavix and ASA  Diet at tolerated   Symptomatic care per primary team  Nothing further to add from a GI standpoint. GI signing-off. Please call with any questions. Thank you for this consult. *Plan of care discussed with Dr. Ger Saunders      Subjective:   Discussed with RN events overnight. Patient resting in bed. Denies bleeding.   Requesting to speak with Dr. Evy Eaton about colonoscopy     Review of Systems:  Symptom Y/N Comments  Symptom Y/N Comments   Fever/Chills n   Chest Pain n    Cough n Headaches n    Sputum n   Joint Pain n    SOB/URBAN n   Pruritis/Rash n    Tolerating Diet y   Other       Could NOT obtain due to:      Objective:   VITALS:   Last 24hrs VS reviewed since prior progress note. Most recent are:  Visit Vitals  /68 (BP 1 Location: Left upper arm, BP Patient Position: At rest)   Pulse 85   Temp 98.2 °F (36.8 °C)   Resp 16   Ht 6' 2\" (1.88 m)   Wt 63.5 kg (140 lb)   SpO2 96%   BMI 17.97 kg/m²       Intake/Output Summary (Last 24 hours) at 10/6/2022 1012  Last data filed at 10/6/2022 0500  Gross per 24 hour   Intake 336.67 ml   Output 450 ml   Net -113.33 ml     PHYSICAL EXAM:  General: Pleasant elderly male. NAD    HEENT: NC, Atraumatic. Anicteric sclerae. Lungs:  CTA Bilaterally. No Wheezing/Rhonchi/Rales. Heart:  Regular  rhythm,  No murmur, No Rubs, No Gallops  Abdomen: Soft, Non distended, Non tender. +Bowel sounds, no HSM  Extremities: No c/c/e  Neurologic:  Alert and oriented X 3. No acute neurological distress   Psych:   Good insight. Not anxious nor agitated. Lab and Radiology Data Reviewed: (see below)    Medications Reviewed: (see below)  PMH/SH reviewed - no change compared to H&P  ________________________________________________________________________  Total time spent with patient: 15 minutes ________________________________________________________________________  Care Plan discussed with:  Patient x   Family     RN x              Consultant:       Lito Bush NP     Procedures: see electronic medical records for all procedures/Xrays and details which were not copied into this note but were reviewed prior to creation of Plan.       LABS:  Recent Labs     10/06/22  0544 10/05/22  1259   WBC 3.2* 13.5*   HGB 7.8* 5.1*   HCT 26.1* 17.4*    472*     Recent Labs     10/06/22  0544 10/05/22  1259    136   K 3.2* 3.9   * 104   CO2 25 27   BUN 24* 21*   CREA 0.82 0.94   GLU 99 110*   CA 7.9* 8.9     Recent Labs     10/05/22  1259   AP 57   TP 6.3*   ALB 2.8*   GLOB 3.5     No results for input(s): INR, PTP, APTT, INREXT in the last 72 hours. Recent Labs     10/06/22  0126   TIBC 359   PSAT 5*      Lab Results   Component Value Date/Time    Folate 35.9 (H) 07/22/2022 11:26 PM     No results for input(s): PH, PCO2, PO2 in the last 72 hours. No results for input(s): CPK, CKMB in the last 72 hours.     No lab exists for component: TROPONINI  Lab Results   Component Value Date/Time    Color DARK YELLOW 09/25/2022 05:16 PM    Appearance CLEAR 09/25/2022 05:16 PM    Specific gravity 1.018 09/25/2022 05:16 PM    pH (UA) 6.0 09/25/2022 05:16 PM    Protein Negative 09/25/2022 05:16 PM    Glucose Negative 09/25/2022 05:16 PM    Ketone TRACE (A) 09/25/2022 05:16 PM    Bilirubin Negative 09/25/2022 05:16 PM    Urobilinogen 1.0 09/25/2022 05:16 PM    Nitrites Negative 09/25/2022 05:16 PM    Leukocyte Esterase Negative 09/25/2022 05:16 PM    Epithelial cells FEW 09/25/2022 05:16 PM    Bacteria Negative 09/25/2022 05:16 PM    WBC 0-4 09/25/2022 05:16 PM    RBC 0-5 09/25/2022 05:16 PM       MEDICATIONS:  Current Facility-Administered Medications   Medication Dose Route Frequency    pantoprazole (PROTONIX) tablet 40 mg  40 mg Oral ACB&D    0.9% sodium chloride infusion 250 mL  250 mL IntraVENous PRN    sucralfate (CARAFATE) tablet 1 g  1 g Oral AC&HS    atorvastatin (LIPITOR) tablet 40 mg  40 mg Oral QHS    sodium chloride (NS) flush 5-40 mL  5-40 mL IntraVENous Q8H    sodium chloride (NS) flush 5-40 mL  5-40 mL IntraVENous PRN    acetaminophen (TYLENOL) tablet 650 mg  650 mg Oral Q6H PRN    Or    acetaminophen (TYLENOL) suppository 650 mg  650 mg Rectal Q6H PRN    polyethylene glycol (MIRALAX) packet 17 g  17 g Oral DAILY PRN    ondansetron (ZOFRAN ODT) tablet 4 mg  4 mg Oral Q8H PRN    Or    ondansetron (ZOFRAN) injection 4 mg  4 mg IntraVENous Q6H PRN

## 2022-10-06 NOTE — PROGRESS NOTES
Problem: Pressure Injury - Risk of  Goal: *Prevention of pressure injury  Description: Document Joseph Scale and appropriate interventions in the flowsheet. Outcome: Progressing Towards Goal  Note: Pressure Injury Interventions:  Sensory Interventions: Assess changes in LOC, Assess need for specialty bed, Check visual cues for pain, Float heels, Keep linens dry and wrinkle-free, Minimize linen layers         Activity Interventions: Increase time out of bed, Pressure redistribution bed/mattress(bed type)    Mobility Interventions: PT/OT evaluation    Nutrition Interventions: Document food/fluid/supplement intake, Discuss nutritional consult with provider, Offer support with meals,snacks and hydration    Friction and Shear Interventions: Transferring/repositioning devices                Problem: Patient Education: Go to Patient Education Activity  Goal: Patient/Family Education  Outcome: Progressing Towards Goal     Problem: Falls - Risk of  Goal: *Absence of Falls  Description: Document Gina Skill Fall Risk and appropriate interventions in the flowsheet.   Outcome: Progressing Towards Goal  Note: Fall Risk Interventions:  Mobility Interventions: Patient to call before getting OOB, PT Consult for mobility concerns, PT Consult for assist device competence         Medication Interventions: Patient to call before getting OOB, Teach patient to arise slowly         History of Falls Interventions: Room close to nurse's station         Problem: Patient Education: Go to Patient Education Activity  Goal: Patient/Family Education  Outcome: Progressing Towards Goal

## 2022-10-06 NOTE — PROGRESS NOTES
1630 Received report from Ayaan Lopez, [facility]. Assumed care of patient. 1730 TRANSFER - OUT REPORT:    Verbal report given to Ed(name) on Alyssa Homes  being transferred to [facility](unit) for routine progression of care       Report consisted of patients Situation, Background, Assessment and   Recommendations(SBAR). Information from the following report(s) SBAR and Cardiac Rhythm NSR/tach  was reviewed with the receiving nurse. Lines:   Peripheral IV 10/05/22 Distal;Right Cephalic (Active)        Opportunity for questions and clarification was provided.       Patient transported with:   [employer]

## 2022-10-06 NOTE — PROGRESS NOTES
Reason for Readmission:  Acute Blood Loss            RUR Score/Risk Level:   20%      PCP: First and Last name:  Prema Rivera MD   Name of Practice:    Are you a current patient: Yes/No:    Approximate date of last visit: 10/4/22   Can you participate in a virtual visit with your PCP:     Is a Care Conference indicated: No      Did you attend your follow up appointment (s): If not, why not:  Yes       Resources/supports as identified by patient/family:  Patient lives at home with his wife        Top Challenges facing patient (as identified by patient/family and CM): Finances/Medication cost? No issues      Transportation    No issues    Support system or lack thereof? No issues    Living arrangements? No issues       Self-care/ADLs/Cognition? No issues          Current Advanced Directive/Advance Care Plan:  FULL CODE  CM confirmed that patient is a Full Code    Primary Decision Maker: Wife, Rosio Zhu, [phone]           Plan for utilizing home health: Declines               Transition of Care Plan:    Based on readmission, the patient's previous Plan of Care   has been evaluated and/or modified. The current Transition of Care Plan is:         Patient lives at home with his wife. Patient uses no DME and is independent in care. Patient's wife will be his transport. Current Dispo: Home/self.

## 2022-10-06 NOTE — PROGRESS NOTES
Problem: Pressure Injury - Risk of  Goal: *Prevention of pressure injury  Description: Document Joseph Scale and appropriate interventions in the flowsheet. Outcome: Progressing Towards Goal  Note: Pressure Injury Interventions:  Sensory Interventions: Assess changes in LOC         Activity Interventions: Increase time out of bed    Mobility Interventions: PT/OT evaluation    Nutrition Interventions: Document food/fluid/supplement intake    Friction and Shear Interventions: Transferring/repositioning devices                Problem: Patient Education: Go to Patient Education Activity  Goal: Patient/Family Education  Outcome: Progressing Towards Goal     Problem: Falls - Risk of  Goal: *Absence of Falls  Description: Document Rubin Fall Risk and appropriate interventions in the flowsheet. Outcome: Progressing Towards Goal  Note: Fall Risk Interventions:  Mobility Interventions: Patient to call before getting OOB         Medication Interventions: Patient to call before getting OOB         History of Falls Interventions: Room close to nurse's station         Problem: Falls - Risk of  Goal: *Absence of Falls  Description: Document Lettie Duverney Fall Risk and appropriate interventions in the flowsheet.   Outcome: Progressing Towards Goal  Note: Fall Risk Interventions:  Mobility Interventions: Patient to call before getting OOB         Medication Interventions: Patient to call before getting OOB         History of Falls Interventions: Room close to nurse's station         Problem: Patient Education: Go to Patient Education Activity  Goal: Patient/Family Education  Outcome: Progressing Towards Goal

## 2022-10-06 NOTE — PROGRESS NOTES
Hospitalist Progress Note    NAME: Praveen Olivas   :  1950   MRN:  [id number]       Assessment / Plan:  Acute blood loss anemia POA  Iron Deficiency POA  H/o Melena  H/o Colonic resection 15 yrs ago by Dr Souleymane Barraza, ?twisted colon during GB surgery, colostomy s/p reversal now  -Hemoglobin is 5.1->7.8 ->5.7 today    s/p 1 unit PRBC overnight  Transfuse 2 units of PRBCs as ordered now-   Check H/H between units today t decide to cont with 2nd unit  IP GI consulted - offered colonoscopy but patient refused, wants CRS Dr Magdaleno Dave to do it if Hb dropped again--- CRS consulted today with drop in Hb at noon today  -Iron profile reveals severe Iron deficiency  Cont clear liquid diet for now  Cont PPI, carafate  He has been holding his home aspirin and Plavix since last admission  -Per GI no plans for repeat EGD but recommends Colonoscopy  IV Iron Q 24 hrs x 3 doses    Hypertension  Dyslipidemia  -Hold home losartan due to severe anemia and melena    Dyslipidemia  -Continue Lipitor    History of CVA  -Holding home aspirin and Plavix due to recent gastric ulcer and also blood loss anemia     Leukocytosis likely reactive- resolved          Code Status: Full code  Surrogate Decision Maker:     DVT Prophylaxis: SCDs  GI Prophylaxis: not indicated     Baseline: From home, independent of ADLs      less than 18.5 Underweight / Body mass index is 17.97 kg/m². Estimated discharge date:   Barriers:    Recommended Disposition: Home w/Family     Subjective:     Chief Complaint / Reason for Physician Visit: F/U Anemia, Melena, Severe Iron deficiency, s/p colonic resection  \"I am ok\". Discussed with RN events overnight.      Review of Systems:  Symptom Y/N Comments  Symptom Y/N Comments   Fever/Chills n   Chest Pain n    Poor Appetite n   Edema n    Cough n   Abdominal Pain n    Sputum n   Joint Pain n    SOB/URBAN n   Pruritis/Rash     Nausea/vomit n   Tolerating PT/OT y    Diarrhea n   Tolerating Diet y Clear liquids Constipation    Other       Could NOT obtain due to:      Objective:     VITALS:   Last 24hrs VS reviewed since prior progress note. Most recent are:  Patient Vitals for the past 24 hrs:   Temp Pulse Resp BP SpO2   10/06/22 1206 98.2 °F (36.8 °C) 78 16 137/65 97 %   10/06/22 0757 98.2 °F (36.8 °C) 85 16 138/68 --   10/06/22 0307 98.1 °F (36.7 °C) 73 13 (!) 130/56 96 %   10/06/22 0145 98.6 °F (37 °C) 79 16 132/78 99 %   10/05/22 2319 98.1 °F (36.7 °C) 82 15 126/61 98 %   10/05/22 2108 98.4 °F (36.9 °C) 79 16 124/77 98 %   10/05/22 2042 98.5 °F (36.9 °C) 78 16 118/61 99 %   10/05/22 1953 98.3 °F (36.8 °C) (!) 58 17 (!) 121/52 100 %   10/05/22 1500 -- 86 13 116/64 99 %   10/05/22 1430 -- 99 13 130/68 98 %       Intake/Output Summary (Last 24 hours) at 10/6/2022 1409  Last data filed at 10/6/2022 0500  Gross per 24 hour   Intake 336.67 ml   Output 450 ml   Net -113.33 ml        I had a face to face encounter and independently examined this patient on 10/6/2022, as outlined below:  PHYSICAL EXAM:  General: WD, WN. Alert, cooperative, no acute distress    EENT:  EOMI. Anicteric sclerae. MMM  Resp:  CTA bilaterally, no wheezing or rales. No accessory muscle use  CV:  Regular  rhythm,  No edema  GI:  Soft, Non distended, Non tender. +Bowel sounds  Neurologic:  Alert and oriented X 3, normal speech,   Psych:   Good insight. Not anxious nor agitated  Skin:  No rashes.   No jaundice    Reviewed most current lab test results and cultures  YES  Reviewed most current radiology test results   YES  Review and summation of old records today    NO  Reviewed patient's current orders and MAR    YES  PMH/SH reviewed - no change compared to H&P  ________________________________________________________________________  Care Plan discussed with:    Comments   Patient x    Family  x Wife at bedside   RN x    Care Manager x    Consultant                       x Multidiciplinary team rounds were held today with , nursing, pharmacist and clinical coordinator. Patient's plan of care was discussed; medications were reviewed and discharge planning was addressed. ________________________________________________________________________  Total NON critical care TIME:  36   Minutes    Total CRITICAL CARE TIME Spent:   Minutes non procedure based      Comments   >50% of visit spent in counseling and coordination of care x    ________________________________________________________________________  Cristela Kong MD     Procedures: see electronic medical records for all procedures/Xrays and details which were not copied into this note but were reviewed prior to creation of Plan. LABS:  I reviewed today's most current labs and imaging studies.   Pertinent labs include:  Recent Labs     10/06/22  1240 10/06/22  0544 10/05/22  1259   WBC  --  3.2* 13.5*   HGB 5.7* 7.8* 5.1*   HCT 18.9* 26.1* 17.4*   PLT  --  223 472*     Recent Labs     10/06/22  0544 10/05/22  1259    136   K 3.2* 3.9   * 104   CO2 25 27   GLU 99 110*   BUN 24* 21*   CREA 0.82 0.94   CA 7.9* 8.9   ALB  --  2.8*   TBILI  --  0.4   ALT  --  18       Signed: Cristela Kong MD

## 2022-10-06 NOTE — PROGRESS NOTES
CM met with pt and pt's spouse at  bedside to review 2IM notice in preparation for d/c.  CM placed signed copy on chart.       Denny Tesfaye MSW  Care Management, [facility]

## 2022-10-07 ENCOUNTER — ANESTHESIA EVENT (OUTPATIENT)
Dept: ENDOSCOPY | Age: 72
DRG: 378 | End: 2022-10-07
Payer: MEDICARE

## 2022-10-07 ENCOUNTER — ANESTHESIA (OUTPATIENT)
Dept: ENDOSCOPY | Age: 72
DRG: 378 | End: 2022-10-07
Payer: MEDICARE

## 2022-10-07 LAB
ANION GAP SERPL CALC-SCNC: 8 MMOL/L (ref 5–15)
BUN SERPL-MCNC: 10 MG/DL (ref 6–20)
BUN/CREAT SERPL: 14 (ref 12–20)
CALCIUM SERPL-MCNC: 8.8 MG/DL (ref 8.5–10.1)
CHLORIDE SERPL-SCNC: 108 MMOL/L (ref 97–108)
CO2 SERPL-SCNC: 23 MMOL/L (ref 21–32)
CREAT SERPL-MCNC: 0.74 MG/DL (ref 0.7–1.3)
GLUCOSE SERPL-MCNC: 86 MG/DL (ref 65–100)
HCT VFR BLD AUTO: 27.8 % (ref 36.6–50.3)
HCT VFR BLD AUTO: 28.7 % (ref 36.6–50.3)
HGB BLD-MCNC: 8.7 G/DL (ref 12.1–17)
HGB BLD-MCNC: 9.1 G/DL (ref 12.1–17)
POTASSIUM SERPL-SCNC: 4.1 MMOL/L (ref 3.5–5.1)
SODIUM SERPL-SCNC: 139 MMOL/L (ref 136–145)

## 2022-10-07 PROCEDURE — 74011250636 HC RX REV CODE- 250/636: Performed by: NURSE PRACTITIONER

## 2022-10-07 PROCEDURE — 74011250636 HC RX REV CODE- 250/636: Performed by: SURGERY

## 2022-10-07 PROCEDURE — 74011000250 HC RX REV CODE- 250: Performed by: INTERNAL MEDICINE

## 2022-10-07 PROCEDURE — 80048 BASIC METABOLIC PNL TOTAL CA: CPT

## 2022-10-07 PROCEDURE — 85018 HEMOGLOBIN: CPT

## 2022-10-07 PROCEDURE — 74011000250 HC RX REV CODE- 250: Performed by: SURGERY

## 2022-10-07 PROCEDURE — 65270000029 HC RM PRIVATE

## 2022-10-07 PROCEDURE — 74011250637 HC RX REV CODE- 250/637: Performed by: INTERNAL MEDICINE

## 2022-10-07 PROCEDURE — 0DJD8ZZ INSPECTION OF LOWER INTESTINAL TRACT, VIA NATURAL OR ARTIFICIAL OPENING ENDOSCOPIC: ICD-10-PCS | Performed by: SURGERY

## 2022-10-07 PROCEDURE — 74011000250 HC RX REV CODE- 250: Performed by: NURSE ANESTHETIST, CERTIFIED REGISTERED

## 2022-10-07 PROCEDURE — 76040000008: Performed by: SURGERY

## 2022-10-07 PROCEDURE — 2709999900 HC NON-CHARGEABLE SUPPLY: Performed by: SURGERY

## 2022-10-07 PROCEDURE — 36415 COLL VENOUS BLD VENIPUNCTURE: CPT

## 2022-10-07 PROCEDURE — 76060000033 HC ANESTHESIA 1 TO 1.5 HR: Performed by: SURGERY

## 2022-10-07 PROCEDURE — 74011250636 HC RX REV CODE- 250/636: Performed by: NURSE ANESTHETIST, CERTIFIED REGISTERED

## 2022-10-07 RX ORDER — SODIUM CHLORIDE 9 MG/ML
25 INJECTION, SOLUTION INTRAVENOUS CONTINUOUS
Status: DISCONTINUED | OUTPATIENT
Start: 2022-10-07 | End: 2022-10-08

## 2022-10-07 RX ORDER — ATROPINE SULFATE 0.1 MG/ML
0.5 INJECTION INTRAVENOUS
Status: DISCONTINUED | OUTPATIENT
Start: 2022-10-07 | End: 2022-10-07

## 2022-10-07 RX ORDER — SODIUM CHLORIDE 0.9 % (FLUSH) 0.9 %
5-40 SYRINGE (ML) INJECTION AS NEEDED
Status: DISCONTINUED | OUTPATIENT
Start: 2022-10-07 | End: 2022-10-08 | Stop reason: HOSPADM

## 2022-10-07 RX ORDER — NALOXONE HYDROCHLORIDE 0.4 MG/ML
0.4 INJECTION, SOLUTION INTRAMUSCULAR; INTRAVENOUS; SUBCUTANEOUS
Status: DISCONTINUED | OUTPATIENT
Start: 2022-10-07 | End: 2022-10-07

## 2022-10-07 RX ORDER — SODIUM CHLORIDE 9 MG/ML
50 INJECTION, SOLUTION INTRAVENOUS CONTINUOUS
Status: DISCONTINUED | OUTPATIENT
Start: 2022-10-07 | End: 2022-10-07

## 2022-10-07 RX ORDER — LIDOCAINE HYDROCHLORIDE 20 MG/ML
INJECTION, SOLUTION EPIDURAL; INFILTRATION; INTRACAUDAL; PERINEURAL AS NEEDED
Status: DISCONTINUED | OUTPATIENT
Start: 2022-10-07 | End: 2022-10-07 | Stop reason: HOSPADM

## 2022-10-07 RX ORDER — EPINEPHRINE 0.1 MG/ML
1 INJECTION INTRACARDIAC; INTRAVENOUS
Status: DISCONTINUED | OUTPATIENT
Start: 2022-10-07 | End: 2022-10-07

## 2022-10-07 RX ORDER — PROPOFOL 10 MG/ML
INJECTION, EMULSION INTRAVENOUS AS NEEDED
Status: DISCONTINUED | OUTPATIENT
Start: 2022-10-07 | End: 2022-10-07 | Stop reason: HOSPADM

## 2022-10-07 RX ORDER — SODIUM CHLORIDE 0.9 % (FLUSH) 0.9 %
5-40 SYRINGE (ML) INJECTION EVERY 8 HOURS
Status: DISCONTINUED | OUTPATIENT
Start: 2022-10-07 | End: 2022-10-08 | Stop reason: HOSPADM

## 2022-10-07 RX ORDER — PHENYLEPHRINE HCL IN 0.9% NACL 0.4MG/10ML
SYRINGE (ML) INTRAVENOUS AS NEEDED
Status: DISCONTINUED | OUTPATIENT
Start: 2022-10-07 | End: 2022-10-07 | Stop reason: HOSPADM

## 2022-10-07 RX ORDER — DEXTROMETHORPHAN/PSEUDOEPHED 2.5-7.5/.8
1.2 DROPS ORAL
Status: DISCONTINUED | OUTPATIENT
Start: 2022-10-07 | End: 2022-10-07

## 2022-10-07 RX ORDER — GLYCOPYRROLATE 0.2 MG/ML
INJECTION INTRAMUSCULAR; INTRAVENOUS AS NEEDED
Status: DISCONTINUED | OUTPATIENT
Start: 2022-10-07 | End: 2022-10-07 | Stop reason: HOSPADM

## 2022-10-07 RX ORDER — EPHEDRINE SULFATE/0.9% NACL/PF 50 MG/5 ML
SYRINGE (ML) INTRAVENOUS AS NEEDED
Status: DISCONTINUED | OUTPATIENT
Start: 2022-10-07 | End: 2022-10-07 | Stop reason: HOSPADM

## 2022-10-07 RX ORDER — FLUMAZENIL 0.1 MG/ML
0.2 INJECTION INTRAVENOUS
Status: DISCONTINUED | OUTPATIENT
Start: 2022-10-07 | End: 2022-10-07

## 2022-10-07 RX ADMIN — SODIUM CHLORIDE 50 ML/HR: 9 INJECTION, SOLUTION INTRAVENOUS at 22:25

## 2022-10-07 RX ADMIN — Medication 10 MG: at 13:39

## 2022-10-07 RX ADMIN — Medication 120 MCG: at 13:35

## 2022-10-07 RX ADMIN — SUCRALFATE 1 G: 1 TABLET ORAL at 06:01

## 2022-10-07 RX ADMIN — SUCRALFATE 1 G: 1 TABLET ORAL at 22:25

## 2022-10-07 RX ADMIN — LIDOCAINE HYDROCHLORIDE 40 MG: 20 INJECTION, SOLUTION EPIDURAL; INFILTRATION; INTRACAUDAL; PERINEURAL at 13:11

## 2022-10-07 RX ADMIN — SUCRALFATE 1 G: 1 TABLET ORAL at 16:59

## 2022-10-07 RX ADMIN — Medication 10 MG: at 13:35

## 2022-10-07 RX ADMIN — PROPOFOL 30 MG: 10 INJECTION, EMULSION INTRAVENOUS at 13:46

## 2022-10-07 RX ADMIN — PROPOFOL 50 MG: 10 INJECTION, EMULSION INTRAVENOUS at 13:18

## 2022-10-07 RX ADMIN — PANTOPRAZOLE SODIUM 40 MG: 40 TABLET, DELAYED RELEASE ORAL at 06:02

## 2022-10-07 RX ADMIN — PROPOFOL 30 MG: 10 INJECTION, EMULSION INTRAVENOUS at 13:15

## 2022-10-07 RX ADMIN — SODIUM CHLORIDE 25 ML/HR: 9 INJECTION, SOLUTION INTRAVENOUS at 12:02

## 2022-10-07 RX ADMIN — PANTOPRAZOLE SODIUM 40 MG: 40 TABLET, DELAYED RELEASE ORAL at 16:59

## 2022-10-07 RX ADMIN — PROPOFOL 40 MG: 10 INJECTION, EMULSION INTRAVENOUS at 13:35

## 2022-10-07 RX ADMIN — SODIUM CHLORIDE, PRESERVATIVE FREE 10 ML: 5 INJECTION INTRAVENOUS at 22:58

## 2022-10-07 RX ADMIN — SODIUM CHLORIDE 25 ML/HR: 9 INJECTION, SOLUTION INTRAVENOUS at 17:02

## 2022-10-07 RX ADMIN — SODIUM CHLORIDE, PRESERVATIVE FREE 10 ML: 5 INJECTION INTRAVENOUS at 06:02

## 2022-10-07 RX ADMIN — PROPOFOL 20 MG: 10 INJECTION, EMULSION INTRAVENOUS at 14:01

## 2022-10-07 RX ADMIN — IRON SUCROSE 100 MG: 20 INJECTION, SOLUTION INTRAVENOUS at 15:13

## 2022-10-07 RX ADMIN — GLYCOPYRROLATE 0.2 MG: 0.2 INJECTION, SOLUTION INTRAMUSCULAR; INTRAVENOUS at 13:25

## 2022-10-07 RX ADMIN — PROPOFOL 20 MG: 10 INJECTION, EMULSION INTRAVENOUS at 14:05

## 2022-10-07 RX ADMIN — SODIUM CHLORIDE, PRESERVATIVE FREE 10 ML: 5 INJECTION INTRAVENOUS at 15:14

## 2022-10-07 RX ADMIN — SODIUM CHLORIDE, PRESERVATIVE FREE 10 ML: 5 INJECTION INTRAVENOUS at 22:57

## 2022-10-07 RX ADMIN — ATORVASTATIN CALCIUM 40 MG: 40 TABLET, FILM COATED ORAL at 22:25

## 2022-10-07 RX ADMIN — PROPOFOL 40 MG: 10 INJECTION, EMULSION INTRAVENOUS at 13:40

## 2022-10-07 RX ADMIN — PROPOFOL 30 MG: 10 INJECTION, EMULSION INTRAVENOUS at 13:21

## 2022-10-07 RX ADMIN — PROPOFOL 40 MG: 10 INJECTION, EMULSION INTRAVENOUS at 13:28

## 2022-10-07 RX ADMIN — PROPOFOL 20 MG: 10 INJECTION, EMULSION INTRAVENOUS at 13:54

## 2022-10-07 RX ADMIN — PROPOFOL 70 MG: 10 INJECTION, EMULSION INTRAVENOUS at 13:11

## 2022-10-07 NOTE — PROGRESS NOTES
..End of Shift Note    Bedside shift change report given to Hiwot (oncoming nurse) by Mere Rodgers, RN (offgoing nurse). Report included the following information SBAR    Shift worked:  [id number]     Shift summary and any significant changes:    Pt. Received a unit of blood and now waiting for second unit. Concerns for physician to address: no     Zone phone for oncoming shift:        Activity:  Activity Level: Bed Rest  Number times ambulated in hallways past shift: 0  Number of times OOB to chair past shift: 0    Cardiac:   Cardiac Monitoring: No           Access:  Current line(s): PIV     Genitourinary:   Urinary status: voiding    Respiratory:   O2 Device: None (Room air)  Chronic home O2 use?: NO  Incentive spirometer at bedside: NO       GI:  Last Bowel Movement Date: 10/04/22  Current diet:  ADULT DIET Clear Liquid  DIET NPO  Passing flatus: YES  Tolerating current diet: YES       Pain Management:   Patient states pain is manageable on current regimen: YES    Skin:  Joseph Score: 18  Interventions: increase time out of bed    Patient Safety:  Fall Score:  Total Score: 2  Interventions: pt to call before getting OOB  High Fall Risk: Yes    Length of Stay:  Expected LOS: 3d 0h  Actual LOS: 1      Mere Rodgers, RN

## 2022-10-07 NOTE — PROCEDURES
Colonoscopy Procedure Note    Indications: Rectal bleeding    Anesthesia/Sedation: MAC anesthesia Propofol    Pre-Procedure Exam:  Airway: clear   Heart: normal S1and S2    Lungs: clear bilateral  Abdomen: soft, nontender, bowel sounds present and normal in all quadrants   Mental Status: awake, alert, and oriented to person, place, and time      Procedure in Detail:  Informed consent was obtained for the procedure, including sedation. Risks of perforation, hemorrhage, adverse drug reaction, and aspiration were discussed. The patient was placed in the left lateral decubitus position. Based on the pre-procedure assessment, including review of the patient's medical history, medications, allergies, and review of systems, he had been deemed to be an appropriate candidate for moderate sedation; he was therefore sedated with the medications listed above. The patient was monitored continuously with ECG tracing, pulse oximetry, blood pressure monitoring, and direct observations. A rectal examination was performed. The [id number] was inserted into the rectum and advanced under direct vision to the ileocolic anastomosis. The quality of the colonic preparation was fair. A careful inspection was made as the colonoscope was withdrawn, including a retroflexed view of the rectum; findings and interventions are described below. Appropriate photodocumentation was obtained. Findings:   Rectum:     - Melenic stool  Sigmoid:     - Melena. No bright red blood. +Diverticulosis  -Severe tortuosity and extrinsic stiffness. Required switching from adult to pediatric colonoscope. Descending Colon:     - Melena throughout. Able to adequately wash and obtain good visualization of the colonic mucosa. No bright red blood. No source of bleeding. Transverse Colon:     -  - Melena throughout. Able to adequately wash and obtain good visualization of the colonic mucosa. No bright red blood. No source of bleeding.   Ascending Colon:     - Normal appearing ileocolic anastomosis. Bilious fluid in the ileum but no ileocecal valve  Cecum:     - Surgically removed      Specimens: No specimens were collected. EBL: None    Complications: None; patient tolerated the procedure well. Attending Attestation: I performed the procedure. Recommendations:    - If rebleeds, he may need pillcam vs. Repeat CTA (GI bleed protocol).   Defer to GI

## 2022-10-07 NOTE — PROGRESS NOTES
Shift report given to [employer]. Patient received one unit of PRBCs last night due to low hemoglobin after transfusion it went up to 8.7. Patient completed bowel prep for Colonoscopy at 1:00pm. No c/o of pain.

## 2022-10-07 NOTE — PROGRESS NOTES
TRANSFER - OUT REPORT:    Verbal report given to Wilfredlorena(name) on Wilian Estimable  being transferred to (unit) for routine post - op       Report consisted of patients Situation, Background, Assessment and   Recommendations(SBAR). Information from the following report(s) Procedure Summary, Intake/Output, MAR, Recent Results, and Procedure Verification was reviewed with the receiving nurse. Lines:   Peripheral IV 10/05/22 Distal;Right Cephalic (Active)   Site Assessment Clean, dry, & intact 10/07/22 0807   Phlebitis Assessment 0 10/07/22 0807   Infiltration Assessment 0 10/07/22 0807   Dressing Status Clean, dry, & intact 10/07/22 0807   Dressing Type Tape;Transparent 10/07/22 0807   Hub Color/Line Status Pink; Infusing;Flushed 10/07/22 0807   Alcohol Cap Used Yes 10/06/22 2000       Peripheral IV 10/05/22 Anterior; Left Forearm (Active)   Site Assessment Clean, dry, & intact 10/07/22 0808   Phlebitis Assessment 0 10/07/22 0808   Infiltration Assessment 0 10/07/22 0808   Dressing Status Clean, dry, & intact 10/07/22 0808   Dressing Type Tape;Transparent 10/07/22 0808   Hub Color/Line Status Pink;Capped;Flushed 10/07/22 0615        Opportunity for questions and clarification was provided.

## 2022-10-07 NOTE — PERIOP NOTES
Endoscopy Case End Note:    1408:  Procedure scope was pre-cleaned, per protocol, at bedside by KANDI Sorensen. 1411:  Report received from anesthesia - Katherin George CRNA. See anesthesia flowsheet for intra-procedure vital signs and events. 1411:  Glasses returned to patient.

## 2022-10-07 NOTE — PROGRESS NOTES
Pina Jade  1950  [id number]    Situation:  Verbal report received from: Alvarado Holden  Procedure: Procedure(s):  COLONOSCOPY    Background:    Preoperative diagnosis: rectal bleeding  Postoperative diagnosis: diverticulosis    :  Dr. Nazanin Poe  Assistant(s): Endoscopy RN-1: Dominga Salinas RN    Specimens: * No specimens in log *  H. Pylori  no    Assessment:  Intra-procedure medications     Anesthesia gave intra-procedure sedation and medications, see anesthesia flow sheet yes    Intravenous fluids: NS@ KVO     Vital signs stable Yes    Abdominal assessment: round and soft yes    Recommendation:  Discharge patient per MD order no.   Return to floor yes  Family or Friend Christina, wife  Permission to share finding with family or friend yes

## 2022-10-07 NOTE — PROGRESS NOTES
Hospitalist Progress Note    NAME: Ivan Dawn   :  1950   MRN:  [id number]       Assessment / Plan:  Acute blood loss anemia POA  Iron Deficiency POA  H/o Melena  H/o Colonic resection 15 yrs ago by Dr Alfredo Awad, ?twisted colon during GB surgery, colostomy s/p reversal now  -Hemoglobin is 5.1->7.8 ->5.7 ->8.7 today    s/p 1+2= 3 unit PRBC total  Cont to check H/H Q 12 hr now  IP GI consulted - offered colonoscopy but patient refused, wants CRS Dr Sherrill Cisneros to do it if Hb dropped again--- CRS consulted t- plan for colonoscopy today PM  -Iron profile reveals severe Iron deficiency  Cont clear liquid diet for now  Cont PPI, carafate  He has been holding his home aspirin and Plavix since last admission  -Per GI no plans for repeat EGD but recommends Colonoscopy  IV Iron Q 24 hrs x 3 doses    Hypertension  Dyslipidemia  -Hold home losartan due to severe anemia and melena    Dyslipidemia  -Continue Lipitor    History of CVA  -Holding home aspirin and Plavix due to recent gastric ulcer and also blood loss anemia     Leukocytosis likely reactive- resolved          Code Status: Full code  Surrogate Decision Maker: wife     DVT Prophylaxis: SCDs  GI Prophylaxis: not indicated     Baseline: From home, independent of ADLs      less than 18.5 Underweight / Body mass index is 17.97 kg/m². Estimated discharge date: ? Barriers: Colorectal surgery clearance, Hb stability post Colonoscopy    Recommended Disposition: Home w/Family once cleared by Colorectal surgery post Colonoscopy     Subjective:     Chief Complaint / Reason for Physician Visit: F/U Anemia, Melena, Severe Iron deficiency, s/p colonic resection  \"I am ok\". Discussed with RN events overnight.      Review of Systems:  Symptom Y/N Comments  Symptom Y/N Comments   Fever/Chills n   Chest Pain n    Poor Appetite n   Edema n    Cough n   Abdominal Pain n    Sputum n   Joint Pain n    SOB/URBAN n   Pruritis/Rash     Nausea/vomit n   Tolerating PT/OT y Diarrhea n   Tolerating Diet y Clear liquids   Constipation    Other       Could NOT obtain due to:      Objective:     VITALS:   Last 24hrs VS reviewed since prior progress note. Most recent are:  Patient Vitals for the past 24 hrs:   Temp Pulse Resp BP SpO2   10/07/22 1156 98 °F (36.7 °C) 92 23 (!) 161/78 99 %   10/07/22 1142 97.9 °F (36.6 °C) 80 18 (!) 144/62 100 %   10/07/22 0759 97.9 °F (36.6 °C) 79 -- 137/77 97 %   10/07/22 0231 98.3 °F (36.8 °C) 90 17 (!) 141/84 --   10/06/22 2256 98.3 °F (36.8 °C) 86 17 (!) 151/74 --   10/06/22 2015 98.3 °F (36.8 °C) 80 17 (!) 152/77 --   10/06/22 1839 98.9 °F (37.2 °C) 76 16 (!) 148/63 --   10/06/22 1550 98.2 °F (36.8 °C) 79 16 133/70 93 %         Intake/Output Summary (Last 24 hours) at 10/7/2022 1404  Last data filed at 10/7/2022 0231  Gross per 24 hour   Intake 1389.83 ml   Output 1200 ml   Net 189.83 ml          I had a face to face encounter and independently examined this patient on 10/7/2022, as outlined below:  PHYSICAL EXAM:  General: WD, WN. Alert, cooperative, no acute distress    EENT:  EOMI. Anicteric sclerae. MMM  Resp:  CTA bilaterally, no wheezing or rales. No accessory muscle use  CV:  Regular  rhythm,  No edema  GI:  Soft, Non distended, Non tender. +Bowel sounds  Neurologic:  Alert and oriented X 3, normal speech,   Psych:   Good insight. Not anxious nor agitated  Skin:  No rashes.   No jaundice    Reviewed most current lab test results and cultures  YES  Reviewed most current radiology test results   YES  Review and summation of old records today    NO  Reviewed patient's current orders and MAR    YES  PMH/SH reviewed - no change compared to H&P  ________________________________________________________________________  Care Plan discussed with:    Comments   Patient x    Family  x Wife at bedside   RN x    Care Manager x    Consultant  x Surgery NP Shandra Loredo x Multidiciplinary team rounds were held today with , nursing, pharmacist and clinical coordinator. Patient's plan of care was discussed; medications were reviewed and discharge planning was addressed. ________________________________________________________________________  Total NON critical care TIME:  26   Minutes    Total CRITICAL CARE TIME Spent:   Minutes non procedure based      Comments   >50% of visit spent in counseling and coordination of care x    ________________________________________________________________________  Toby Lopez MD     Procedures: see electronic medical records for all procedures/Xrays and details which were not copied into this note but were reviewed prior to creation of Plan. LABS:  I reviewed today's most current labs and imaging studies.   Pertinent labs include:  Recent Labs     10/07/22  0412 10/06/22  1240 10/06/22  0544 10/05/22  1259   WBC  --   --  3.2* 13.5*   HGB 8.7* 5.7* 7.8* 5.1*   HCT 27.8* 18.9* 26.1* 17.4*   PLT  --   --  223 472*       Recent Labs     10/07/22  0412 10/06/22  0544 10/05/22  1259    144 136   K 4.1 3.2* 3.9    112* 104   CO2 23 25 27   GLU 86 99 110*   BUN 10 24* 21*   CREA 0.74 0.82 0.94   CA 8.8 7.9* 8.9   ALB  --   --  2.8*   TBILI  --   --  0.4   ALT  --   --  18         Signed: Toby Lopez MD

## 2022-10-07 NOTE — PROGRESS NOTES
CRS Progress Note    No active bleeding. Just melena throughout and tortuous colon. He has an ileocolic anastomosis so there is no valve to prevent reflux. If he rebleeds, he may need pillcam.  Defer to GI. I put him in for a full liquid diet. This can be advanced over the weekend. CRS will follow as needed over the weekend.

## 2022-10-07 NOTE — PROGRESS NOTES
Problem: Pressure Injury - Risk of  Goal: *Prevention of pressure injury  Description: Document Joseph Scale and appropriate interventions in the flowsheet. Outcome: Progressing Towards Goal  Note: Pressure Injury Interventions:  Sensory Interventions: Assess changes in LOC         Activity Interventions: Increase time out of bed    Mobility Interventions: PT/OT evaluation    Nutrition Interventions: Document food/fluid/supplement intake    Friction and Shear Interventions: HOB 30 degrees or less                Problem: Patient Education: Go to Patient Education Activity  Goal: Patient/Family Education  Outcome: Progressing Towards Goal     Problem: Falls - Risk of  Goal: *Absence of Falls  Description: Document Rubin Fall Risk and appropriate interventions in the flowsheet.   Outcome: Progressing Towards Goal  Note: Fall Risk Interventions:  Mobility Interventions: Patient to call before getting OOB         Medication Interventions: Patient to call before getting OOB         History of Falls Interventions: Room close to nurse's station         Problem: Patient Education: Go to Patient Education Activity  Goal: Patient/Family Education  Outcome: Progressing Towards Goal

## 2022-10-07 NOTE — PROGRESS NOTES
.. TRANSFER - IN REPORT:    Verbal report received from Hiwot(name) on Nathalie Au  being received from 3212(unit) for ordered procedure      Report consisted of patients Situation, Background, Assessment and   Recommendations(SBAR). Information from the following report(s) OR Summary, Procedure Summary, Intake/Output, MAR, and Accordion was reviewed with the receiving nurse. Opportunity for questions and clarification was provided. Assessment completed upon patients arrival to unit and care assumed.

## 2022-10-07 NOTE — ANESTHESIA POSTPROCEDURE EVALUATION
Procedure(s):  COLONOSCOPY.    total IV anesthesia, general    Anesthesia Post Evaluation        Patient location during evaluation: PACU  Note status: Adequate. Level of consciousness: responsive to verbal stimuli and sleepy but conscious  Pain management: satisfactory to patient  Airway patency: patent  Anesthetic complications: no  Cardiovascular status: acceptable  Respiratory status: acceptable  Hydration status: acceptable  Comments: +Post-Anesthesia Evaluation and Assessment    Patient: Ming Wyman MRN: [id number]  SSN: [id number]   YOB: 1950  Age: 67 y.o. Sex: male      Cardiovascular Function/Vital Signs    /64   Pulse 75   Temp 36.4 °C (97.6 °F)   Resp 12   Ht 6' 2\" (1.88 m)   Wt 63.5 kg (140 lb)   SpO2 97%   BMI 17.97 kg/m²     Patient is status post Procedure(s):  COLONOSCOPY. Nausea/Vomiting: Controlled. Postoperative hydration reviewed and adequate. Pain:  Pain Scale 1: Numeric (0 - 10) (10/07/22 1430)  Pain Intensity 1: 0 (10/07/22 1430)   Managed. Neurological Status: At baseline. Mental Status and Level of Consciousness: Arousable. Pulmonary Status:   O2 Device: None (Room air) (10/07/22 1430)   Adequate oxygenation and airway patent. Complications related to anesthesia: None    Post-anesthesia assessment completed. No concerns. Signed By: Eduardo Shepard MD    10/7/2022  Post anesthesia nausea and vomiting:  controlled      INITIAL Post-op Vital signs:   Vitals Value Taken Time   /64 10/07/22 1430   Temp 36.4 °C (97.6 °F) 10/07/22 1419   Pulse 74 10/07/22 1432   Resp 14 10/07/22 1432   SpO2 100 % 10/07/22 1432   Vitals shown include unvalidated device data.

## 2022-10-07 NOTE — PROGRESS NOTES
..End of Shift Note    Bedside shift change report given to Hiwot (oncoming nurse) by Reji Block RN (offgoing nurse). Report included the following information SBAR    Shift worked: [id number]     Shift summary and any significant changes:    Pt. Completed colonoscopy today. Concerns for physician to address:    Zone phone for oncoming shift:       Activity:  Activity Level: Up with Assistance  Number times ambulated in hallways past shift: 0  Number of times OOB to chair past shift: 0    Cardiac:   Cardiac Monitoring: Yes      Cardiac Rhythm: Sinus Rhythm    Access:  Current line(s): PIV     Genitourinary:   Urinary status: voiding    Respiratory:   O2 Device: None (Room air)  Chronic home O2 use?: NO  Incentive spirometer at bedside: NO       GI:  Last Bowel Movement Date: 10/07/22  Current diet:  ADULT DIET Full Liquid  Passing flatus: YES  Tolerating current diet: YES       Pain Management:   Patient states pain is manageable on current regimen: YES    Skin:  Joseph Score: 18  Interventions: increase time out of bed    Patient Safety:  Fall Score:  Total Score: 0  Interventions: gripper socks  High Fall Risk: Yes    Length of Stay:  Expected LOS: 3d 0h  Actual LOS: 2      Reji Block, RN

## 2022-10-08 VITALS
HEIGHT: 74 IN | BODY MASS INDEX: 17.97 KG/M2 | RESPIRATION RATE: 16 BRPM | HEART RATE: 79 BPM | OXYGEN SATURATION: 98 % | WEIGHT: 140 LBS | TEMPERATURE: 98.2 F | DIASTOLIC BLOOD PRESSURE: 76 MMHG | SYSTOLIC BLOOD PRESSURE: 144 MMHG

## 2022-10-08 LAB
ABO + RH BLD: NORMAL
BLD PROD TYP BPU: NORMAL
BLOOD GROUP ANTIBODIES SERPL: NORMAL
BPU ID: NORMAL
CROSSMATCH RESULT,%XM: NORMAL
HCT VFR BLD AUTO: 28.6 % (ref 36.6–50.3)
HGB BLD-MCNC: 8.8 G/DL (ref 12.1–17)
SPECIMEN EXP DATE BLD: NORMAL
STATUS OF UNIT,%ST: NORMAL
UNIT DIVISION, %UDIV: 0

## 2022-10-08 PROCEDURE — 74011250637 HC RX REV CODE- 250/637: Performed by: INTERNAL MEDICINE

## 2022-10-08 PROCEDURE — 74011000250 HC RX REV CODE- 250: Performed by: SURGERY

## 2022-10-08 PROCEDURE — 36415 COLL VENOUS BLD VENIPUNCTURE: CPT

## 2022-10-08 PROCEDURE — 85018 HEMOGLOBIN: CPT

## 2022-10-08 PROCEDURE — 74011000250 HC RX REV CODE- 250: Performed by: INTERNAL MEDICINE

## 2022-10-08 PROCEDURE — 74011250636 HC RX REV CODE- 250/636: Performed by: NURSE PRACTITIONER

## 2022-10-08 RX ORDER — PANTOPRAZOLE SODIUM 40 MG/1
40 GRANULE, DELAYED RELEASE ORAL
Qty: 60 EACH | Refills: 0 | Status: SHIPPED | OUTPATIENT
Start: 2022-10-08 | End: 2022-11-07

## 2022-10-08 RX ORDER — SUCRALFATE 1 G/1
1 TABLET ORAL
Qty: 56 TABLET | Refills: 0 | Status: SHIPPED | OUTPATIENT
Start: 2022-10-08 | End: 2022-10-22

## 2022-10-08 RX ADMIN — IRON SUCROSE 100 MG: 20 INJECTION, SOLUTION INTRAVENOUS at 08:29

## 2022-10-08 RX ADMIN — SUCRALFATE 1 G: 1 TABLET ORAL at 11:54

## 2022-10-08 RX ADMIN — SODIUM CHLORIDE, PRESERVATIVE FREE 10 ML: 5 INJECTION INTRAVENOUS at 06:00

## 2022-10-08 RX ADMIN — SUCRALFATE 1 G: 1 TABLET ORAL at 06:13

## 2022-10-08 RX ADMIN — PANTOPRAZOLE SODIUM 40 MG: 40 TABLET, DELAYED RELEASE ORAL at 06:13

## 2022-10-08 NOTE — DISCHARGE SUMMARY
Hospitalist Discharge Summary     Patient ID:  Valarie Mora  [id number]  34 y.o.  1950  10/5/2022    PCP on record: Franci Gregg MD    Admit date: 10/5/2022  Discharge date and time: 10/8/2022    DISCHARGE DIAGNOSIS:    Acute blood loss anemia POA- Hb stable s/p 3 units PRBCs, Keep off ASA and plavix, ocnt PPI BID, Carafate  Iron Deficiency POA  H/o Melena  H/o Colonic resection 15 yrs ago by Dr Kaia Manzano, ?twisted colon during GB surgery, colostomy s/p reversal now  -Hemoglobin is 5.1->7.8 ->5.7 ->8.7 ->9.1->8.8 today  Hypertension  Dyslipidemia  History of CVA- off ASA, Plavix for now  Full code      CONSULTATIONS:  IP CONSULT TO HOSPITALIST  IP CONSULT TO GASTROENTEROLOGY  IP CONSULT TO COLORECTAL SURGERY    Excerpted HPI from H&P of Dr Grupo Coffey:  \"71 y.o.  male who presents with past medical's of hypertension, CVA is coming the hospital chief complaints of low hemoglobin and also dark stool. Patient was recently admitted to hospital and underwent an endoscopy and was noted to have an ulcer and was prescribed Protonix and recommended colonoscopy but patient refused. Patient went to see his primary care physician and had lab work which revealed a hemoglobin was advised to go to the emergency department for further evaluation. He reports 1 episode of black stools since discharge from the hospital.    On arrival to ED, he was noted to have hemoglobin of 5.1 for which PRBCs were ordered and gastroenterology was consulted. We were asked to admit for work up and evaluation of the above problems. \"    ______________________________________________________________________  DISCHARGE SUMMARY/HOSPITAL COURSE:  for full details see H&P, daily progress notes, labs, consult notes. Acute blood loss anemia POA  Iron Deficiency POA  H/o Melena  H/o Colonic resection 15 yrs ago by Dr Kaia Manzano, ?twisted colon during GB surgery, colostomy s/p reversal now  -Hemoglobin is 5.1->7.8 ->5.7 ->8. 7 today     s/p 1+2= 3 unit PRBC total  Cont to check H/H Q 12 hr now  IP GI consulted - offered colonoscopy but patient refused, wants CRS Dr Jalil Alan to do it if Hb dropped again--- CRS consulted s/p colonoscopy- no source of bleeding found  -Iron profile reveals severe Iron deficiency  Cont clear liquid diet for now  Cont PPI, carafate  He has been holding his home aspirin and Plavix since last admission  -Per GI no plans for repeat EGD but recommends Colonoscopy- done by CRS due to abnormal anatomy  IV Iron Q 24 hrs x 3 doses    Hypertension  Dyslipidemia  -Hold home losartan due to severe anemia and melena    Dyslipidemia  -Continue Lipitor    History of CVA  -Holding home aspirin and Plavix due to recent gastric ulcer and also blood loss anemia     Leukocytosis likely reactive- resolved           Code Status: Full code  Surrogate Decision Maker: wife        _______________________________________________________________________  Patient seen and examined by me on discharge day. Pertinent Findings:  Gen:    Not in distress  Chest: Clear lungs  CVS:   Regular rhythm. No edema  Abd:  Soft, not distended, not tender  Neuro:  Alert,   _______________________________________________________________________  DISCHARGE MEDICATIONS:   Current Discharge Medication List        CONTINUE these medications which have CHANGED    Details   sucralfate (CARAFATE) 1 gram tablet Take 1 Tablet by mouth Before breakfast, lunch, dinner and at bedtime for 14 days. Qty: 56 Tablet, Refills: 0  Start date: 10/8/2022, End date: 10/22/2022      pantoprazole (PROTONIX) 40 mg granules for oral suspension Take 40 mg by mouth Before breakfast and dinner for 30 days. Qty: 60 Each, Refills: 0  Start date: 10/8/2022, End date: 11/7/2022           CONTINUE these medications which have NOT CHANGED    Details   b complex vitamins tablet Take 1 Tablet by mouth daily as needed. coenzyme q10 10 mg cap Take  by mouth.       atorvastatin (LIPITOR) 40 mg tablet TAKE 1 TABLET BY MOUTH DAILY. REPEAT FASTING LABS 3-2-2022 BEFORE FURTHER REFILLS  Qty: 90 Tablet, Refills: 3    Comments: **Patient requests 90 days supply**      losartan (COZAAR) 25 mg tablet Take 1 Tablet by mouth daily. Qty: 90 Tablet, Refills: 2      cyanocobalamin 1,000 mcg tablet Take 1,000 mcg by mouth. Patient Follow Up Instructions: Activity: Activity as tolerated  Diet: Cardiac Diet and Low fat, Low cholesterol      Follow-up with PCP in 1 week.       Follow-up Information       Follow up With Specialties Details Why Contact Info    Priti Vieyra MD Gastroenterology, Surgery Physician, Internal Medicine Physician             ________________________________________________________________    Risk of deterioration: Low    Condition at Discharge:  Stable  __________________________________________________________________    Disposition  Home with family, no needs    ____________________________________________________________________    Code Status: Full Code  ___________________________________________________________________      Total time in minutes spent coordinating this discharge (includes going over instructions, follow-up, prescriptions, and preparing report for sign off to her PCP) :  >30 minutes    Signed:  Indra Nguyen MD

## 2022-10-08 NOTE — PROGRESS NOTES
Problem: Falls - Risk of  Goal: *Absence of Falls  Description: Document Karla Rylan Fall Risk and appropriate interventions in the flowsheet.   Outcome: Progressing Towards Goal  Note: Fall Risk Interventions:  Mobility Interventions: Bed/chair exit alarm         Medication Interventions: Teach patient to arise slowly         History of Falls Interventions: Door open when patient unattended

## 2022-10-08 NOTE — PROGRESS NOTES
Pt is cleared for d/c from a CM standpoint. CM acknowledged d/c order. CM met with pt at bedside to discuss. Pt's wife is on the way to transport pt home. 2IM reviewed with pt and signed copy placed on bedside chart. CM unable to make follow up appts due to weekend d/c. Care Management Interventions  PCP Verified by CM: Yes  Last Visit to PCP: 10/04/22  Mode of Transport at Discharge: Other (see comment) (spouse)  Transition of Care Consult (CM Consult):  Other (home with spouse)  Discharge Durable Medical Equipment: No  Physical Therapy Consult: No  Occupational Therapy Consult: No  Speech Therapy Consult: No  Support Systems: Spouse/Significant Other  Confirm Follow Up Transport: Family  The Patient and/or Patient Representative was Provided with a Choice of Provider and Agrees with the Discharge Plan?: Yes  Freedom of Choice List was Provided with Basic Dialogue that Supports the Patient's Individualized Plan of Care/Goals, Treatment Preferences and Shares the Quality Data Associated with the Providers?: Yes  Discharge Location  Patient Expects to be Discharged to[de-identified] Home with family assistance      Iven Moritz, MSW  Care Management, [place]

## 2022-10-08 NOTE — PROGRESS NOTES
Discharge instructions reviewed with patient, verbalized understanding. Copy of discharge instructions given to patient. Both IV's removed without incident. Patient has all of belongings. No c/o pain or distress. No n/v/d. Patient discharged via wheelchair with wife without incident.

## 2022-10-08 NOTE — DISCHARGE INSTRUCTIONS
HOSPITALIST DISCHARGE INSTRUCTIONS    NAME: Valarie Mora   :  1950   MRN:  [id number]     Date/Time:  10/8/2022 11:12 AM    ADMIT DATE: 10/5/2022     DISCHARGE DATE: 10/8/2022     DISCHARGE DIAGNOSIS:  Acute blood loss anemia POA- Hb stable s/p 3 units PRBCs, Keep off ASA and plavix, ocnt PPI BID, Carafate  Iron Deficiency POA  H/o Melena  H/o Colonic resection 15 yrs ago by Dr Kaia Manzano, ?twisted colon during GB surgery, colostomy s/p reversal now  -Hemoglobin is 5.1->7.8 ->5.7 ->8.7 ->9.1->8.8 today  Hypertension  Dyslipidemia  History of CVA- off ASA, Plavix for now  Full code       MEDICATIONS:  As per medication reconciliation  list  It is important that you take the medication exactly as they are prescribed. Keep your medication in the bottles provided by the pharmacist and keep a list of the medication names, dosages, and times to be taken in your wallet. Do not take other medications without consulting your doctor. Pain Management: per above medications    What to do at Home    Recommended diet:  Cardiac Diet and Low fat, Low cholesterol    Recommended activity: Activity as tolerated    If you have questions regarding the hospital related prescriptions or hospital related issues please call at [phone]. If you experience any of the following symptoms then please call your primary care physician or return to the emergency room if you cannot get hold of your doctor:  Fever, chills, nausea, vomiting, diarrhea, change in mentation, falling, bleeding, shortness of breath,     Follow Up:  PCP you are to call and set up an appointment to see them in 7-10 days. Dr Ree Beckford or GI Dr Kalie Maciel for Capsule endoscopy if rebleed        Information obtained by :  I understand that if any problems occur once I am at home I am to contact my physician. I understand and acknowledge receipt of the instructions indicated above. Physician's or R.N.'s Signature                                                                  Date/Time                                                                                                                                              Patient or Representative Signature                                                          Date/Time

## 2024-06-03 NOTE — PROGRESS NOTES
End of Shift Note    Bedside shift change report given to Halle Bolanos and Missael Dale (oncoming nurse) by Carl Youngblood (offgoing nurse). Report included the following information SBAR, Kardex, Intake/Output, MAR, Recent Results, and Cardiac Rhythm NSR    Shift worked:  7 am to 7 pm     Shift summary and any significant changes:     GONZALEZ today. PICC to be placed for IV antibiotics. Concerns for physician to address:  none     Zone phone for oncoming shift:   [phone]       Activity:  Activity Level: Up with Assistance  Number times ambulated in hallways past shift: 0  Number of times OOB to chair past shift: 0    Cardiac:   Cardiac Monitoring: Yes      Cardiac Rhythm: Sinus Rhythm    Access:   Current line(s): PIV     Genitourinary:   Urinary status: voiding    Respiratory:   O2 Device: None (Room air)  Chronic home O2 use?: NO  Incentive spirometer at bedside: N/A       GI:  Last Bowel Movement Date:  (PTA)  Current diet:  ADULT ORAL NUTRITION SUPPLEMENT Breakfast, Dinner; Low Calorie/High Protein  ADULT DIET Regular  Passing flatus: YES  Tolerating current diet: YES       Pain Management:   Patient states pain is manageable on current regimen: N/A    Skin:  Joseph Score: 20  Interventions: increase time out of bed    Patient Safety:  Fall Score:  Total Score: 3  Interventions: bed/chair alarm, gripper socks, and pt to call before getting OOB  High Fall Risk: Yes    Length of Stay:  Expected LOS: 4d 9h  Actual LOS: [address]

## (undated) DEVICE — SYR 10ML LUER LOK 1/5ML GRAD --

## (undated) DEVICE — NEONATAL-ADULT SPO2 SENSOR: Brand: NELLCOR

## (undated) DEVICE — CATH IV AUTOGRD BC PNK 20GA 25 -- INSYTE

## (undated) DEVICE — BAG SPEC BIOHZRD 10 X 10 IN --

## (undated) DEVICE — Device

## (undated) DEVICE — YANKAUER,TAPERED BULBOUS TIP,W/O VENT: Brand: MEDLINE

## (undated) DEVICE — Z DISCONTINUED PER MEDLINE LINE GAS SAMPLING O2/CO2 LNG AD 13 FT NSL W/ TBNG FILTERLINE

## (undated) DEVICE — BLOCK BITE ENDOSCP AD 21 MM W/ DIL BLU LF DISP

## (undated) DEVICE — SOLIDIFIER FLD 2OZ 1500CC N DISINF IN BTL DISP SAFESORB

## (undated) DEVICE — FORCEPS BX L160CM DIA8MM GRSP DISECT CUP TIP NONLOCKING ROT

## (undated) DEVICE — CONTAINER SPEC 20 ML LID NEUT BUFF FORMALIN 10 % POLYPR STS

## (undated) DEVICE — ELECTRODE,RADIOTRANSLUCENT,FOAM,5PK: Brand: MEDLINE

## (undated) DEVICE — SET ADMIN 16ML TBNG L100IN 2 Y INJ SITE IV PIGGY BK DISP

## (undated) DEVICE — BASIN EMSIS 16OZ GRAPHITE PLAS KID SHP MOLD GRAD FOR ORAL

## (undated) DEVICE — HYPODERMIC SAFETY NEEDLE: Brand: MAGELLAN

## (undated) DEVICE — SYR 3ML LL TIP 1/10ML GRAD --

## (undated) DEVICE — 1200 GUARD II KIT W/5MM TUBE W/O VAC TUBE: Brand: GUARDIAN

## (undated) DEVICE — TOWEL 4 PLY TISS 19X30 SUE WHT